# Patient Record
Sex: MALE | Race: WHITE | NOT HISPANIC OR LATINO | Employment: OTHER | URBAN - METROPOLITAN AREA
[De-identification: names, ages, dates, MRNs, and addresses within clinical notes are randomized per-mention and may not be internally consistent; named-entity substitution may affect disease eponyms.]

---

## 2017-02-23 ENCOUNTER — OFFICE VISIT (OUTPATIENT)
Dept: FAMILY MEDICINE | Facility: CLINIC | Age: 32
End: 2017-02-23
Payer: MEDICARE

## 2017-02-23 VITALS
HEART RATE: 80 BPM | SYSTOLIC BLOOD PRESSURE: 130 MMHG | TEMPERATURE: 99.1 F | BODY MASS INDEX: 25.66 KG/M2 | WEIGHT: 159 LBS | OXYGEN SATURATION: 95 % | DIASTOLIC BLOOD PRESSURE: 82 MMHG

## 2017-02-23 DIAGNOSIS — F90.0 ATTENTION DEFICIT HYPERACTIVITY DISORDER (ADHD), PREDOMINANTLY INATTENTIVE TYPE: Primary | ICD-10-CM

## 2017-02-23 DIAGNOSIS — F71 MR (MENTAL RETARDATION), MODERATE: ICD-10-CM

## 2017-02-23 LAB
ANION GAP SERPL CALCULATED.3IONS-SCNC: 6 MMOL/L (ref 3–14)
BUN SERPL-MCNC: 13 MG/DL (ref 7–30)
CALCIUM SERPL-MCNC: 9.7 MG/DL (ref 8.5–10.1)
CHLORIDE SERPL-SCNC: 103 MMOL/L (ref 94–109)
CO2 SERPL-SCNC: 31 MMOL/L (ref 20–32)
CREAT SERPL-MCNC: 0.77 MG/DL (ref 0.66–1.25)
ERYTHROCYTE [DISTWIDTH] IN BLOOD BY AUTOMATED COUNT: 13.4 % (ref 10–15)
GFR SERPL CREATININE-BSD FRML MDRD: NORMAL ML/MIN/1.7M2
GLUCOSE SERPL-MCNC: 94 MG/DL (ref 70–99)
HCT VFR BLD AUTO: 42.4 % (ref 40–53)
HGB BLD-MCNC: 14 G/DL (ref 13.3–17.7)
MCH RBC QN AUTO: 29.5 PG (ref 26.5–33)
MCHC RBC AUTO-ENTMCNC: 33 G/DL (ref 31.5–36.5)
MCV RBC AUTO: 89 FL (ref 78–100)
PLATELET # BLD AUTO: 334 10E9/L (ref 150–450)
POTASSIUM SERPL-SCNC: 4.6 MMOL/L (ref 3.4–5.3)
RBC # BLD AUTO: 4.75 10E12/L (ref 4.4–5.9)
SODIUM SERPL-SCNC: 140 MMOL/L (ref 133–144)
WBC # BLD AUTO: 8 10E9/L (ref 4–11)

## 2017-02-23 PROCEDURE — 80048 BASIC METABOLIC PNL TOTAL CA: CPT | Performed by: FAMILY MEDICINE

## 2017-02-23 PROCEDURE — 36415 COLL VENOUS BLD VENIPUNCTURE: CPT | Performed by: FAMILY MEDICINE

## 2017-02-23 PROCEDURE — 99214 OFFICE O/P EST MOD 30 MIN: CPT | Performed by: FAMILY MEDICINE

## 2017-02-23 PROCEDURE — 85027 COMPLETE CBC AUTOMATED: CPT | Performed by: FAMILY MEDICINE

## 2017-02-23 NOTE — PROGRESS NOTES
SUBJECTIVE:     CC: Isaiah Dominguez is an 31 year old male who presents for preventative health visit.   SUBJECTIVE:   Chief Complaint   Patient presents with     Physical-Other     group home - renew orders     Isaiah Dominguez, a 31 year old male scheduled an appointment to discuss the following issues:     Attention deficit hyperactivity disorder (ADHD), predominantly inattentive type  MR (mental retardation), moderate      Medical, social, surgical, and family histories reviewed. February 23, 2017      OBJECTIVE:  ROS:  CONSTITUTIONAL: NEGATIVE for fever, chills, change in weight  INTEGUMENTARY: NEGATIVE for worrisome rashes, moles or lesions  ENT/MOUTH: NEGATIVE for ear, mouth and throat problems  RESPIRATIONS: NEGATIVE for significant cough or SOB  CV: NEGATIVE for chest pain, palpitations or peripheral edema  GI: NEGATIVE for nausea, abdominal pain, heartburn, or change in bowel habits  : NEGATIVE for frequency, dysuria, or hematuria    EXAM:  /82  Pulse 80  Temp 99.1  F (37.3  C) (Tympanic)  Wt 159 lb (72.1 kg)  SpO2 95%  BMI 25.66 kg/m2  General Appearance: healthy, alert, active and no distress  Nose: Nares normal  Heart: regular rate and rhythm  with normal S1, S2 ; no murmur, rub or gallops  Lungs: clear to auscultation  Abdomen: Soft, nontender.  Normal bowel sounds.  No hepatosplenomegaly or abnormal masses  Extremities: no peripheral edema, peripheral pulses normal  Musculoskeletal: negative  Skin: negative  ASSESSMENT/PLAN:    ICD-10-CM    1. Attention deficit hyperactivity disorder (ADHD), predominantly inattentive type F90.0 CBC with platelets     Basic metabolic panel   2. MR (mental retardation), moderate F71 CBC with platelets     Basic metabolic panel       Routine Health Forms fill out  Renew Standing orders

## 2017-02-23 NOTE — NURSING NOTE
"Chief Complaint   Patient presents with     Physical-Other     group home - renew orders       Initial /82  Pulse 80  Temp 99.1  F (37.3  C) (Tympanic)  Wt 159 lb (72.1 kg)  SpO2 95%  BMI 25.66 kg/m2 Estimated body mass index is 25.66 kg/(m^2) as calculated from the following:    Height as of 1/13/16: 5' 6\" (1.676 m).    Weight as of this encounter: 159 lb (72.1 kg).  Medication Reconciliation: complete    Health Maintenance that is potentially due pending provider review:  NONE    n/a      "

## 2017-02-23 NOTE — MR AVS SNAPSHOT
After Visit Summary   2/23/2017    Isaiah Dominguez    MRN: [id number]           Patient Information     Date Of Birth          1985        Visit Information        Provider Department      2/23/2017 3:40 PM Isaiah Veras MD [facility]        Today's Diagnoses     Attention deficit hyperactivity disorder (ADHD), predominantly inattentive type    -  1    MR (mental retardation), moderate          Care Instructions      Preventive Health Recommendations  Male Ages 26 - 39    Yearly exam:             See your health care provider every year in order to  o   Review health changes.   o   Discuss preventive care.    o   Review your medicines if your doctor has prescribed any.    You should be tested each year for STDs (sexually transmitted diseases), if you re at risk.     After age 35, talk to your provider about cholesterol testing. If you are at risk for heart disease, have your cholesterol tested at least every 5 years.     If you are at risk for diabetes, you should have a diabetes test (fasting glucose).  Shots: Get a flu shot each year. Get a tetanus shot every 10 years.     Nutrition:    Eat at least 5 servings of fruits and vegetables daily.     Eat whole-grain bread, whole-wheat pasta and brown rice instead of white grains and rice.     Talk to your provider about Calcium and Vitamin D.     Lifestyle    Exercise for at least 150 minutes a week (30 minutes a day, 5 days a week). This will help you control your weight and prevent disease.     Limit alcohol to one drink per day.     No smoking.     Wear sunscreen to prevent skin cancer.     See your dentist every six months for an exam and cleaning.           Follow-ups after your visit        Who to contact     If you have questions or need follow up information about today's clinic visit or your schedule please contact [facility] directly at [phone].  Normal or non-critical lab and  "imaging results will be communicated to you by MyChart, letter or phone within 4 business days after the clinic has received the results. If you do not hear from us within 7 days, please contact the clinic through [employer] or phone. If you have a critical or abnormal lab result, we will notify you by phone as soon as possible.  Submit refill requests through [employer] or call your pharmacy and they will forward the refill request to us. Please allow 3 business days for your refill to be completed.          Additional Information About Your Visit        [employer] Information     [employer] lets you send messages to your doctor, view your test results, renew your prescriptions, schedule appointments and more. To sign up, go to [email or web address] . Click on \"Log in\" on the left side of the screen, which will take you to the Welcome page. Then click on \"Sign up Now\" on the right side of the page.     You will be asked to enter the access code listed below, as well as some personal information. Please follow the directions to create your username and password.     Your access code is: [email or web address]  Expires: 2017  4:00 PM     Your access code will  in 90 days. If you need help or a new code, please call your [place] clinic or [phone].        Care EveryWhere ID     This is your Care EveryWhere ID. This could be used by other organizations to access your [place] medical records  [id number]        Your Vitals Were     Pulse Temperature Pulse Oximetry BMI (Body Mass Index)          80 99.1  F (37.3  C) (Tympanic) 95% 25.66 kg/m2         Blood Pressure from Last 3 Encounters:   17 130/82   10/17/16 132/88   16 128/80    Weight from Last 3 Encounters:   17 159 lb (72.1 kg)   10/17/16 160 lb (72.6 kg)   16 157 lb 6.4 oz (71.4 kg)              We Performed the Following     Basic metabolic panel     CBC with platelets        Primary Care Provider    Provider Unknown       No address on " file        Thank you!     Thank you for choosing [facility]  for your care. Our goal is always to provide you with excellent care. Hearing back from our patients is one way we can continue to improve our services. Please take a few minutes to complete the written survey that you may receive in the mail after your visit with us. Thank you!             Your Updated Medication List - Protect others around you: Learn how to safely use, store and throw away your medicines at www.disposemymeds.org.          This list is accurate as of: 2/23/17  4:00 PM.  Always use your most recent med list.                   Brand Name Dispense Instructions for use    ACT ANTICAVITY FLUORIDE RINSE 0.05 % Soln   Generic drug:  Sodium Fluoride      bid       BENADRYL 25 MG tablet   Generic drug:  diphenhydrAMINE      Take 25 mg by mouth as needed for itching or allergies       benzoyl peroxide 5 % topical gel      APPLY TOPICALLY AT BEDTIME DAILY       Efinaconazole 10 % Soln     8 mL    Externally apply topically daily Apply to toenail once daily for 48 weeks       LAMICTAL 100 MG tablet   Generic drug:  lamoTRIgine     60    1 TABLET TWICE DAILY       omega 3 1000 MG Caps      2 CAPSULES ORALLY TWICE DAILY       SEROQUEL 200 MG tablet   Generic drug:  QUEtiapine      Take one tablet by mouth every evening.       STRATTERA 25 MG capsule   Generic drug:  atomoxetine     30    1 tab 8 am

## 2017-04-04 ENCOUNTER — OFFICE VISIT (OUTPATIENT)
Dept: FAMILY MEDICINE | Facility: CLINIC | Age: 32
End: 2017-04-04
Payer: MEDICARE

## 2017-04-04 VITALS
OXYGEN SATURATION: 100 % | HEART RATE: 72 BPM | BODY MASS INDEX: 25.66 KG/M2 | SYSTOLIC BLOOD PRESSURE: 128 MMHG | DIASTOLIC BLOOD PRESSURE: 86 MMHG | RESPIRATION RATE: 22 BRPM | WEIGHT: 159 LBS | TEMPERATURE: 98.3 F

## 2017-04-04 DIAGNOSIS — L01.00 IMPETIGO: Primary | ICD-10-CM

## 2017-04-04 PROCEDURE — 99213 OFFICE O/P EST LOW 20 MIN: CPT | Performed by: FAMILY MEDICINE

## 2017-04-04 RX ORDER — MUPIROCIN CALCIUM 20 MG/G
CREAM TOPICAL 3 TIMES DAILY
Qty: 15 G | Refills: 1 | Status: SHIPPED | OUTPATIENT
Start: 2017-04-04 | End: 2023-07-13

## 2017-04-04 NOTE — PROGRESS NOTES
SUBJECTIVE:                                                    sIaiah Dominguez is a 31 year old male who presents to clinic today for the following health issues: SUDDEN ONSET OF LIP LESION.  NO PAST HISTORY OF HERPES.       Mouth Problem      Duration: 1 day    Description (location/character/radiation): sore, yellow crust on lips    Intensity:  moderate    Accompanying signs and symptoms: Patient had some URI symptoms, was using benadryl but uses the PRN    History (similar episodes/previous evaluation): None    Precipitating or alleviating factors: None    Therapies tried and outcome: Nothing           Problem list and histories reviewed & adjusted, as indicated.  Additional history: as documented    Patient Active Problem List   Diagnosis     Attention deficit hyperactivity disorder (ADHD)     Pituitary dwarfism (H)     CARDIOVASCULAR SCREENING; LDL GOAL LESS THAN 160     Auditory hallucinations     MR (mental retardation), moderate     Past Surgical History:   Procedure Laterality Date     C NONSPECIFIC PROCEDURE      S/P bilateral PE tubes       Social History   Substance Use Topics     Smoking status: Passive Smoke Exposure - Never Smoker     Smokeless tobacco: Never Used      Comment: stepfather in the house     Alcohol use No     Family History   Problem Relation Age of Onset     Unknown/Adopted No family hx of      Asthma No family hx of      C.A.D. No family hx of      DIABETES No family hx of      Hypertension No family hx of          Current Outpatient Prescriptions   Medication Sig Dispense Refill     mupirocin (BACTROBAN) 2 % cream Apply topically 3 times daily 15 g 1     Efinaconazole 10 % SOLN Externally apply topically daily Apply to toenail once daily for 48 weeks 8 mL 3     diphenhydrAMINE (BENADRYL) 25 MG tablet Take 25 mg by mouth as needed for itching or allergies       SEROQUEL 200 MG OR TABS Take one tablet by mouth every evening.       STRATTERA 25 MG OR CAPS 1 tab 8 am 30 0     LAMICTAL  100 MG OR TABS 1 TABLET TWICE DAILY 60 0     OMEGA 3 1000 MG OR CAPS 2 CAPSULES ORALLY TWICE DAILY       BENZOYL PEROXIDE 5 % EX GEL APPLY TOPICALLY AT BEDTIME DAILY       ACT ANTICAVITY FLUORIDE RINSE 0.05 % MT SOLN bid         Reviewed and updated as needed this visit by clinical staff  Tobacco  Allergies  Med Hx  Surg Hx  Fam Hx  Soc Hx      Reviewed and updated as needed this visit by Provider         ROS:  C: NEGATIVE for fever, chills, change in weight  E/M: NEGATIVE for ear, mouth and throat problems  R: NEGATIVE for significant cough or SOB  CV: NEGATIVE for chest pain, palpitations or peripheral edema    OBJECTIVE:                                                    /86 (BP Location: Right arm, Patient Position: Chair, Cuff Size: Adult Regular)  Pulse 72  Temp 98.3  F (36.8  C) (Tympanic)  Resp 22  Wt 159 lb (72.1 kg)  SpO2 100%  BMI 25.66 kg/m2  Body mass index is 25.66 kg/(m^2).  GENERAL: healthy, alert and no distress  NECK: no adenopathy, no asymmetry, masses, or scars and thyroid normal to palpation  RESP: lungs clear to auscultation - no rales, rhonchi or wheezes  CV: regular rate and rhythm, normal S1 S2, no S3 or S4, no murmur, click or rub, no peripheral edema and peripheral pulses strong  ABDOMEN: soft, nontender, no hepatosplenomegaly, no masses and bowel sounds normal  LIP  HE HAS CRUSTY PURULENT LESION ON BOTH THE UPPER AND LOWER LIP ON THE RIGHT.          ASSESSMENT/PLAN:                                                            1. Impetigo    - mupirocin (BACTROBAN) 2 % cream; Apply topically 3 times daily  Dispense: 15 g; Refill: 1    ASSESSMENT/PLAN:      ICD-10-CM    1. Impetigo L01.00 mupirocin (BACTROBAN) 2 % cream       Patient Instructions   1. Wash gently with washcoth three times a day    2. Rub cream into lesions three times daily after wash.    3. Call if not improving one week.          Isaiah Veras MD  [facility]

## 2017-04-04 NOTE — LETTER
[facility]  [address] [place] [id number]  [phone]      April 4, 2017      Isaiah MCKEON Alberto  [address] [address] [facility] [address]         Isaiah Ferguson, He was seen today and may go back to work on 4/6/17.          Sincerely,  Isaiah Veras MD

## 2017-04-04 NOTE — MR AVS SNAPSHOT
"              After Visit Summary   2017    Isaiah Dominguez    MRN: [id number]           Patient Information     Date Of Birth          1985        Visit Information        Provider Department      2017 3:00 PM Isaiah Veras MD [facility]        Today's Diagnoses     Impetigo    -  1      Care Instructions    1. Wash gently with washcoth three times a day    2. Rub cream into lesions three times daily after wash.    3. Call if not improving one week.        Follow-ups after your visit        Who to contact     If you have questions or need follow up information about today's clinic visit or your schedule please contact [facility] directly at [phone].  Normal or non-critical lab and imaging results will be communicated to you by MyChart, letter or phone within 4 business days after the clinic has received the results. If you do not hear from us within 7 days, please contact the clinic through [employer] or phone. If you have a critical or abnormal lab result, we will notify you by phone as soon as possible.  Submit refill requests through [email or web address] or call your pharmacy and they will forward the refill request to us. Please allow 3 business days for your refill to be completed.          Additional Information About Your Visit        MyChart Information     [email or web address] lets you send messages to your doctor, view your test results, renew your prescriptions, schedule appointments and more. To sign up, go to [email or web address] . Click on \"Log in\" on the left side of the screen, which will take you to the Welcome page. Then click on \"Sign up Now\" on the right side of the page.     You will be asked to enter the access code listed below, as well as some personal information. Please follow the directions to create your username and password.     Your access code is: [email or web address]  Expires: 2017  5:00 PM     Your access code will  in 90 days. If you " need help or a new code, please call your [facility] or [phone].        Care EveryWhere ID     This is your Care EveryWhere ID. This could be used by other organizations to access your [place] medical records  [id number]        Your Vitals Were     Pulse Temperature Respirations Pulse Oximetry BMI (Body Mass Index)       72 98.3  F (36.8  C) (Tympanic) 22 100% 25.66 kg/m2        Blood Pressure from Last 3 Encounters:   04/04/17 128/86   02/23/17 130/82   10/17/16 132/88    Weight from Last 3 Encounters:   04/04/17 159 lb (72.1 kg)   02/23/17 159 lb (72.1 kg)   10/17/16 160 lb (72.6 kg)              Today, you had the following     No orders found for display         Today's Medication Changes          These changes are accurate as of: 4/4/17  3:02 PM.  If you have any questions, ask your nurse or doctor.               Start taking these medicines.        Dose/Directions    mupirocin 2 % cream   Commonly known as:  BACTROBAN   Used for:  Impetigo   Started by:  Isaiah Veras MD        Apply topically 3 times daily   Quantity:  15 g   Refills:  1            Where to get your medicines      These medications were sent to [facility] - [address] [facility] [address]     Phone:  [phone]     mupirocin 2 % cream                Primary Care Provider    Provider Unknown       No address on file        Thank you!     Thank you for choosing [facility]  for your care. Our goal is always to provide you with excellent care. Hearing back from our patients is one way we can continue to improve our services. Please take a few minutes to complete the written survey that you may receive in the mail after your visit with us. Thank you!             Your Updated Medication List - Protect others around you: Learn how to safely use, store and throw away your medicines at www.disposemymeds.org.          This list is accurate  as of: 4/4/17  3:02 PM.  Always use your most recent med list.                   Brand Name Dispense Instructions for use    ACT ANTICAVITY FLUORIDE RINSE 0.05 % Soln   Generic drug:  Sodium Fluoride      bid       BENADRYL 25 MG tablet   Generic drug:  diphenhydrAMINE      Take 25 mg by mouth as needed for itching or allergies       benzoyl peroxide 5 % topical gel      APPLY TOPICALLY AT BEDTIME DAILY       Efinaconazole 10 % Soln     8 mL    Externally apply topically daily Apply to toenail once daily for 48 weeks       LAMICTAL 100 MG tablet   Generic drug:  lamoTRIgine     60    1 TABLET TWICE DAILY       mupirocin 2 % cream    BACTROBAN    15 g    Apply topically 3 times daily       omega 3 1000 MG Caps      2 CAPSULES ORALLY TWICE DAILY       SEROQUEL 200 MG tablet   Generic drug:  QUEtiapine      Take one tablet by mouth every evening.       STRATTERA 25 MG capsule   Generic drug:  atomoxetine     30    1 tab 8 am

## 2017-04-04 NOTE — NURSING NOTE
"Chief Complaint   Patient presents with     Mouth Problem       Initial /86 (BP Location: Right arm, Patient Position: Chair, Cuff Size: Adult Regular)  Pulse 72  Temp 98.3  F (36.8  C) (Tympanic)  Resp 22  Wt 159 lb (72.1 kg)  SpO2 100%  BMI 25.66 kg/m2 Estimated body mass index is 25.66 kg/(m^2) as calculated from the following:    Height as of 1/13/16: 5' 6\" (1.676 m).    Weight as of this encounter: 159 lb (72.1 kg).  Medication Reconciliation: complete    Health Maintenance that is potentially due pending provider review:  NONE    n/a      "

## 2017-06-29 ENCOUNTER — APPOINTMENT (OUTPATIENT)
Dept: GENERAL RADIOLOGY | Facility: CLINIC | Age: 32
End: 2017-06-29
Attending: NURSE PRACTITIONER
Payer: MEDICARE

## 2017-06-29 ENCOUNTER — HOSPITAL ENCOUNTER (EMERGENCY)
Facility: CLINIC | Age: 32
Discharge: HOME | End: 2017-06-29
Attending: NURSE PRACTITIONER | Admitting: NURSE PRACTITIONER
Payer: MEDICARE

## 2017-06-29 VITALS
SYSTOLIC BLOOD PRESSURE: 157 MMHG | TEMPERATURE: 98 F | DIASTOLIC BLOOD PRESSURE: 101 MMHG | OXYGEN SATURATION: 97 % | RESPIRATION RATE: 16 BRPM

## 2017-06-29 DIAGNOSIS — S63.292A: Primary | ICD-10-CM

## 2017-06-29 PROCEDURE — 40000986 XR FINGER RT G/E 2 VW: Mod: RT

## 2017-06-29 PROCEDURE — 99213 OFFICE O/P EST LOW 20 MIN: CPT | Mod: 25 | Performed by: NURSE PRACTITIONER

## 2017-06-29 PROCEDURE — 99213 OFFICE O/P EST LOW 20 MIN: CPT | Mod: 25

## 2017-06-29 PROCEDURE — 26770 TREAT FINGER DISLOCATION: CPT | Performed by: NURSE PRACTITIONER

## 2017-06-29 PROCEDURE — 26770 TREAT FINGER DISLOCATION: CPT

## 2017-06-29 PROCEDURE — 73140 X-RAY EXAM OF FINGER(S): CPT | Mod: RT

## 2017-06-29 NOTE — ED AVS SNAPSHOT
[facility]    [address] [facility] [address]    Phone:  [phone]    Fax:  [phone]                                       Isaiah Dominguez   MRN: [id number]    Department:  [facility] Department   Date of Visit:  6/29/2017           After Visit Summary Signature Page     I have received my discharge instructions, and my questions have been answered. I have discussed any challenges I see with this plan with the nurse or doctor.    ..........................................................................................................................................  Patient/Patient Representative Signature      ..........................................................................................................................................  Patient Representative Print Name and Relationship to Patient    ..................................................               ................................................  Date                                            Time    ..........................................................................................................................................  Reviewed by Signature/Title    ...................................................              ..............................................  Date                                                            Time

## 2017-06-29 NOTE — ED AVS SNAPSHOT
[facility]    [address] [facility] [address]    Phone:  [phone]    Fax:  [phone]                                       Isaiah Dominguez   MRN: [id number]    Department:  [facility]   Date of Visit:  6/29/2017           Patient Information     Date Of Birth          1985        Your diagnoses for this visit were:     Dislocation of distal interphalangeal (DIP) joint of right middle finger, initial encounter        You were seen by Janey Vora APRN CNP.      Follow-up Information     Please follow up.    Why:  As needed, If symptoms worsen        Follow up with Unknown, Provider.        Discharge Instructions         Wear the splint for 5 days with additional buddy tape.  Following the 5 days you may wish to use buddy tape when playing softball for a few additional weeks as needed for comfort.  It is important to complete finger exercises.  Follow up as needed or if there is lack of ability to move the finger.    Finger Dislocation  A finger dislocation occurs when the tissues, or ligaments, that hold the joint together are torn. The bones then move apart, or are dislocated, out of their normal position. This causes pain, swelling, and bruising. Sometimes there is also a small chip fracture. Once the joint is put back into place again, it will take about 6 weeks for the ligaments to heal. During this time, you should protect your finger from re-injury.    Buddy taping is a way to secure your injured finger to the one next to it. Buddy taping allows the joint to move. But it also protects it from dislocating again. Buddy tape can be left in place for up to 6 weeks.  Hand exercises may be prescribed at your follow-up visit. These can help speed healing and maintain function. In most cases you will regain full function of your finger. But it may take 12 to 18 months before all mild pain and swelling goes away and full function  returns.  Home care    Keep your hand raised, or elevated, to reduce pain and swelling. When sitting or lying down, raise your arm above the level of your heart. You can do this by placing your arm on a pillow that rests on your chest. Or your arm can be on a pillow at your side. This is most important during the first 48 hours after injury.    Put an ice pack on the injured area for no more than 15 to 20 minutes every 3 to 6 hours. Do this for the first 24 to 48 hours. You can make an ice pack by wrapping a plastic Ziploc bag of ice cubes in a thin towel. As the ice melts, be careful that the tape, gauze, or splint doesn t get wet. After that, keep using ice as needed to ease pain and swelling.    If you have a removable splint, you may take it off to bathe and then put it back on. If you have a permanent splint, cover your entire hand with 2 plastic bags. Place 1 bag around the other. Tape each bag with duct tape at the top end. Water can still leak in even when your hand is covered. So it's best to keep the splint away from water. If a splint gets wet, you can dry it with a hair-dryer on a cool setting.     If you use buddy tape and it becomes wet or dirty, change it. You may replace it with paper, plastic, or cloth tape. Cloth tape and paper tapes must be kept dry. When re-applying buddy tape, use gauze or cotton padding between your fingers. This will prevent the skin from getting moist and breaking down, or macerating. It is very important to put padding at the web space. This is the small piece of skin that joins the bases of your fingers. Keep the buddy tape in place as instructed by your healthcare provider.    You may use over-the-counter pain medicine to control pain, unless another pain medicine was prescribed. Talk with your provider before taking these medicines if you have chronic liver or kidney disease. Also talk with your provider if you have ever had a stomach ulcer or GI (gastrointestinal)  bleeding.    Do not play sports or do any physical exercise until your healthcare provider says that you can.  Follow-up care  Follow up with your healthcare provider in 1 week, or as advised. Splints should generally not be left in place longer than 3 weeks to avoid stiffness and loss of joint function. It is important that you see the referral doctor. This provider can determine how long to keep your splint in place and when to begin hand exercises.  If X-rays were taken, you will be told of any new findings that may affect your care.  When to seek medical advice  Call your healthcare provider right away if any of the following occur:    The injured finger has more pain or swelling    The injured finger becomes red or warm    The injured finger becomes cold, blue, numb, or tingly  Date Last Reviewed: 11/23/2015 2000-2017 [employer] [address] [place] [address]. All rights reserved. This information is not intended as a substitute for professional medical care. Always follow your healthcare professional's instructions.          24 Hour Appointment Hotline       To make an appointment at any [facility], call [phone] ([phone]). If you don't have a family doctor or clinic, we will help you find one. [facility] are conveniently located to serve the needs of you and your family.             Review of your medicines      Our records show that you are taking the medicines listed below. If these are incorrect, please call your family doctor or clinic.        Dose / Directions Last dose taken    ACT ANTICAVITY FLUORIDE RINSE 0.05 % Soln   Generic drug:  Sodium Fluoride        bid   Refills:  0        BENADRYL 25 MG tablet   Dose:  25 mg   Generic drug:  diphenhydrAMINE        Take 25 mg by mouth as needed for itching or allergies   Refills:  0        benzoyl peroxide 5 % topical gel        APPLY TOPICALLY AT BEDTIME DAILY   Refills:  0        Efinaconazole 10 % Soln    Quantity:  8 mL        Externally apply topically daily Apply to toenail once daily for 48 weeks   Refills:  3        LAMICTAL 100 MG tablet   Quantity:  60   Generic drug:  lamoTRIgine        1 TABLET TWICE DAILY   Refills:  0        mupirocin 2 % cream   Commonly known as:  BACTROBAN   Quantity:  15 g        Apply topically 3 times daily   Refills:  1        omega 3 1000 MG Caps        2 CAPSULES ORALLY TWICE DAILY   Refills:  0        SEROQUEL 200 MG tablet   Generic drug:  QUEtiapine        Take one tablet by mouth every evening.   Refills:  0        STRATTERA 25 MG capsule   Quantity:  30   Generic drug:  atomoxetine        1 tab 8 am   Refills:  0                Procedures and tests performed during your visit     Procedure/Test Number of Times Performed    Fingers XR, 2-3 views, right 2      Orders Needing Specimen Collection     None      Pending Results     Date and Time Order Name Status Description    6/29/2017 1946 Fingers XR, 2-3 views, right Preliminary             Pending Culture Results     No orders found from 6/27/2017 to 6/30/2017.            Pending Results Instructions     If you had any lab results that were not finalized at the time of your Discharge, you can call the ED Lab Result RN at [phone]. You will be contacted by this team for any positive Lab results or changes in treatment. The nurses are available 7 days a week from 10A to 6:30P.  You can leave a message 24 hours per day and they will return your call.        Test Results From Your Hospital Stay        6/29/2017  8:03 PM      Narrative     FINGER RIGHT TWO OR MORE VIEWS    6/29/2017 7:23 PM     INDICATION: Hit with softball.    COMPARISON: None.        Impression     IMPRESSION: Dislocation of the third finger at the DIP joint with  dorsal displacement of the distal phalanx. No fracture demonstrated.    NAE RODRIGUEZ MD         6/29/2017  8:30 PM      Narrative     FINGER RIGHT TWO OR MORE VIEWS   6/29/2017 8:05 PM  "    HISTORY: Post reduction    COMPARISON: 2017        Impression     IMPRESSION: The dislocation of the distal interphalangeal joint of the  middle finger has been reduced. No fracture is evident.                Thank you for choosing [facility]       Thank you for choosing [facility] for your care. Our goal is always to provide you with excellent care. Hearing back from our patients is one way we can continue to improve our services. Please take a few minutes to complete the written survey that you may receive in the mail after you visit with us. Thank you!        [employer] Information     [employer] lets you send messages to your doctor, view your test results, renew your prescriptions, schedule appointments and more. To sign up, go to [email or web address] . Click on \"Log in\" on the left side of the screen, which will take you to the Welcome page. Then click on \"Sign up Now\" on the right side of the page.     You will be asked to enter the access code listed below, as well as some personal information. Please follow the directions to create your username and password.     Your access code is: [email or web address]  Expires: 2017  8:35 PM     Your access code will  in 90 days. If you need help or a new code, please call your [facility] clinic or [phone].        Care EveryWhere ID     This is your Care EveryWhere ID. This could be used by other organizations to access your [facility] medical records  [id number]        Equal Access to Services     GABRIELLA ALCALA : Hadbeverly Asher, waaxda nayeli, qaybta kaalmabernadette summers . So [facility] [phone].    ATENCIÓN: Si habla español, tiene a boo disposición servicios gratuitos de asistencia lingüística. Llame al [phone].    We comply with applicable federal civil rights laws and [place] laws. We do not discriminate on the basis of race, color, national origin, age, disability sex, sexual orientation or gender " identity.            After Visit Summary       This is your record. Keep this with you and show to your community pharmacist(s) and doctor(s) at your next visit.

## 2017-06-30 NOTE — DISCHARGE INSTRUCTIONS
Wear the splint for 5 days with additional buddy tape.  Following the 5 days you may wish to use buddy tape when playing softball for a few additional weeks as needed for comfort.  It is important to complete finger exercises.  Follow up as needed or if there is lack of ability to move the finger.    Finger Dislocation  A finger dislocation occurs when the tissues, or ligaments, that hold the joint together are torn. The bones then move apart, or are dislocated, out of their normal position. This causes pain, swelling, and bruising. Sometimes there is also a small chip fracture. Once the joint is put back into place again, it will take about 6 weeks for the ligaments to heal. During this time, you should protect your finger from re-injury.    Buddy taping is a way to secure your injured finger to the one next to it. Buddy taping allows the joint to move. But it also protects it from dislocating again. Buddy tape can be left in place for up to 6 weeks.  Hand exercises may be prescribed at your follow-up visit. These can help speed healing and maintain function. In most cases you will regain full function of your finger. But it may take 12 to 18 months before all mild pain and swelling goes away and full function returns.  Home care    Keep your hand raised, or elevated, to reduce pain and swelling. When sitting or lying down, raise your arm above the level of your heart. You can do this by placing your arm on a pillow that rests on your chest. Or your arm can be on a pillow at your side. This is most important during the first 48 hours after injury.    Put an ice pack on the injured area for no more than 15 to 20 minutes every 3 to 6 hours. Do this for the first 24 to 48 hours. You can make an ice pack by wrapping a plastic Ziploc bag of ice cubes in a thin towel. As the ice melts, be careful that the tape, gauze, or splint doesn t get wet. After that, keep using ice as needed to ease pain and swelling.    If you  have a removable splint, you may take it off to bathe and then put it back on. If you have a permanent splint, cover your entire hand with 2 plastic bags. Place 1 bag around the other. Tape each bag with duct tape at the top end. Water can still leak in even when your hand is covered. So it's best to keep the splint away from water. If a splint gets wet, you can dry it with a hair-dryer on a cool setting.     If you use nishi tape and it becomes wet or dirty, change it. You may replace it with paper, plastic, or cloth tape. Cloth tape and paper tapes must be kept dry. When re-applying nishi tape, use gauze or cotton padding between your fingers. This will prevent the skin from getting moist and breaking down, or macerating. It is very important to put padding at the web space. This is the small piece of skin that joins the bases of your fingers. Keep the nishi tape in place as instructed by your healthcare provider.    You may use over-the-counter pain medicine to control pain, unless another pain medicine was prescribed. Talk with your provider before taking these medicines if you have chronic liver or kidney disease. Also talk with your provider if you have ever had a stomach ulcer or GI (gastrointestinal) bleeding.    Do not play sports or do any physical exercise until your healthcare provider says that you can.  Follow-up care  Follow up with your healthcare provider in 1 week, or as advised. Splints should generally not be left in place longer than 3 weeks to avoid stiffness and loss of joint function. It is important that you see the referral doctor. This provider can determine how long to keep your splint in place and when to begin hand exercises.  If X-rays were taken, you will be told of any new findings that may affect your care.  When to seek medical advice  Call your healthcare provider right away if any of the following occur:    The injured finger has more pain or swelling    The injured finger  becomes red or warm    The injured finger becomes cold, blue, numb, or tingly  Date Last Reviewed: 11/23/2015 2000-2017 [employer]. [address] [place] [address], [place] [address]. All rights reserved. This information is not intended as a substitute for professional medical care. Always follow your healthcare professional's instructions.

## 2017-06-30 NOTE — ED PROVIDER NOTES
History     Chief Complaint   Patient presents with     Hand Injury     jammed finger while playing basketball     HPI  Isaiah Dominguez is a 31 year old male who presents with right middle finger injury.  Pt reports he was playing softball and playing catch and the ball hit his middle finger and jammed it.  Pt reports pain from MIP joint to DIP joint of palmar side.  Pt reports mild to moderate pain.  Pt reports normal sensation.  Pt lives in group home for special needs.    I have reviewed the Medications, Allergies, Past Medical and Surgical History, and Social History in the Epic system.    Allergies:   Allergies   Allergen Reactions     No Known Allergies        No current facility-administered medications on file prior to encounter.   Current Outpatient Prescriptions on File Prior to Encounter:  mupirocin (BACTROBAN) 2 % cream Apply topically 3 times daily   Efinaconazole 10 % SOLN Externally apply topically daily Apply to toenail once daily for 48 weeks   diphenhydrAMINE (BENADRYL) 25 MG tablet Take 25 mg by mouth as needed for itching or allergies   SEROQUEL 200 MG OR TABS Take one tablet by mouth every evening.   STRATTERA 25 MG OR CAPS 1 tab 8 am   LAMICTAL 100 MG OR TABS 1 TABLET TWICE DAILY   OMEGA 3 1000 MG OR CAPS 2 CAPSULES ORALLY TWICE DAILY   BENZOYL PEROXIDE 5 % EX GEL APPLY TOPICALLY AT BEDTIME DAILY   ACT ANTICAVITY FLUORIDE RINSE 0.05 % MT SOLN bid     Patient Active Problem List   Diagnosis     Attention deficit hyperactivity disorder (ADHD)     Pituitary dwarfism (H)     CARDIOVASCULAR SCREENING; LDL GOAL LESS THAN 160     Auditory hallucinations     MR (mental retardation), moderate     Past Surgical History:   Procedure Laterality Date     C NONSPECIFIC PROCEDURE      S/P bilateral PE tubes     Most Recent Immunizations   Administered Date(s) Administered     DPT 08/08/1991     DTAP (<7y) 05/13/1999     Hepatitis A Vac Ped/Adol-2 Dose 01/25/2008     Hepatitis B 10/21/1998     Influenza  (H1N1) 02/15/2010     Influenza (IIV3) 10/02/2012     Influenza Vaccine IM 3yrs+ 4 Valent IIV4 10/21/2016     Influenza Vaccine IM Ages 6-35 Months 4 Valent (PF) 09/23/2013     MMR 05/13/1999     Mantoux 04/18/2008     OPV 08/08/1991     TDAP Vaccine (Adacel) 03/22/2010       Review of Systems  10 point ROS of systems including Constitutional, Eyes, Respiratory, Cardiovascular, Gastroenterology, Genitourinary, Integumentary, Muscularskeletal, Psychiatric were all negative except for pertinent positives noted in my HPI.    Physical Exam   BP: (!) 159/103  Heart Rate: 93  Temp: 98  F (36.7  C)  Resp: 16  SpO2: 97 %  Physical Exam   Constitutional: He is oriented to person, place, and time. He appears well-developed and well-nourished. No distress.   HENT:   Head: Normocephalic and atraumatic.   Right Ear: External ear normal.   Left Ear: External ear normal.   Nose: Nose normal.   Mouth/Throat: Oropharynx is clear and moist.   Eyes: Conjunctivae and EOM are normal. Pupils are equal, round, and reactive to light. Right eye exhibits no discharge. Left eye exhibits no discharge.   Neck: Normal range of motion. Neck supple.   Cardiovascular: Normal rate, regular rhythm and normal heart sounds.  Exam reveals no gallop and no friction rub.    No murmur heard.  Pulmonary/Chest: Effort normal and breath sounds normal. No respiratory distress. He has no wheezes. He has no rales.   Musculoskeletal:        Right hand: He exhibits tenderness (at DIP joint palmar side of middle finger of right hand). He exhibits normal two-point discrimination. Normal sensation noted. Normal strength noted.        Hands:  Lymphadenopathy:     He has no cervical adenopathy.   Neurological: He is alert and oriented to person, place, and time.   Skin: Skin is warm and dry. No rash noted. He is not diaphoretic. No erythema. No pallor.   Nursing note and vitals reviewed.      ED Course     ED Course     Procedures   Reduction of dislocation of right  middle finger DIP joint with dorsal displacement of the distal phalanx.  Full informed consent obtained.  The patient's right middle finger was grasped above the MIP joint with the left hand of the provider and the right tip of the finger at the DIP joint was grasped with the right hand of the provider.  Longitudinal traction was applied and the DIP joint was gently hyperextended and it slid back into place easily and without discomfort.  Follow up xray confirmed reduction.  Pt placed in splint and nishi taped.      Labs Ordered and Resulted from Time of ED Arrival Up to the Time of Departure from the ED - No data to display  Results for orders placed or performed during the hospital encounter of 06/29/17   Fingers XR, 2-3 views, right    Narrative    FINGER RIGHT TWO OR MORE VIEWS    6/29/2017 7:23 PM     INDICATION: Hit with softball.    COMPARISON: None.      Impression    IMPRESSION: Dislocation of the third finger at the DIP joint with  dorsal displacement of the distal phalanx. No fracture demonstrated.    NAE RODRIGUEZ MD   Fingers XR, 2-3 views, right    Narrative    FINGER RIGHT TWO OR MORE VIEWS   6/29/2017 8:05 PM     HISTORY: Post reduction    COMPARISON: 6/29/2017      Impression    IMPRESSION: The dislocation of the distal interphalangeal joint of the  middle finger has been reduced. No fracture is evident.       Assessments & Plan (with Medical Decision Making)     I have reviewed the nursing notes.    I have reviewed the findings, diagnosis, plan and need for follow up with the patient.  Isaiah Dominguez is a 31 year old male who presents with right middle finger injury.  Pt reports he was playing softball and playing catch and the ball hit his middle finger and jammed it.  Pt reports pain from MIP joint to DIP joint of palmar side.  Pt reports mild to moderate pain.  Pt reports normal sensation.  Pt lives in group home for special needs. Exam reveals bruising and tenderness at DIP joint and  inability to flex at DIP joint of right middle finger.  Xray reveals dorsal dislocation.  Reduction of dislocation of right middle finger DIP joint with dorsal displacement of the distal phalanx.  Full informed consent obtained. The patient's right middle finger was grasped above the MIP joint with the left hand of the provider and the right tip of the finger at the DIP joint was grasped with the right hand of the provider.  Longitudinal traction was applied and the DIP joint was gently hyperextended and it slid back into place easily and without discomfort.  Follow up xray confirmed reduction.  Pt placed in splint and nishi taped.  Recommended wear splint for 5 days and then nishi tape prn with sports for up to 3 weeks.  DDx: Fracture, dislocation, contusion, sprain/strain  Blood pressure persistently elevated throughout visit, recommended follow up next week with PCP for further evaluation.    New Prescriptions    No medications on file       Final diagnoses:   Dislocation of distal interphalangeal (DIP) joint of right middle finger, initial encounter       6/29/2017   [facility] EMERGENCY DEPARTMENT     Janey Vora APRN CNP  06/29/17 2039       Janey Vora APRN CNP  06/29/17 2041       Janey Vora APRN CNP  06/29/17 2043

## 2017-09-29 ENCOUNTER — TELEPHONE (OUTPATIENT)
Dept: FAMILY MEDICINE | Facility: CLINIC | Age: 32
End: 2017-09-29

## 2017-09-30 NOTE — TELEPHONE ENCOUNTER
Does patient need to see Dr. Veras or podiatrist regarding his medication for his toe fungus? He is coming up on his one year of being on the medication for the fungus. Please call to discuss.    Jazmyn HUTCHINS  Central Scheduler

## 2017-10-03 ENCOUNTER — ALLIED HEALTH/NURSE VISIT (OUTPATIENT)
Dept: FAMILY MEDICINE | Facility: CLINIC | Age: 32
End: 2017-10-03
Payer: MEDICARE

## 2017-10-03 DIAGNOSIS — Z23 NEED FOR PROPHYLACTIC VACCINATION AND INOCULATION AGAINST INFLUENZA: Primary | ICD-10-CM

## 2017-10-03 PROCEDURE — 90686 IIV4 VACC NO PRSV 0.5 ML IM: CPT

## 2017-10-03 PROCEDURE — 99207 ZZC NO CHARGE NURSE ONLY: CPT

## 2017-10-03 PROCEDURE — G0008 ADMIN INFLUENZA VIRUS VAC: HCPCS

## 2017-10-03 NOTE — MR AVS SNAPSHOT
"              After Visit Summary   10/3/2017    Isaiah Dominguez    MRN: [id number]           Patient Information     Date Of Birth          1985        Visit Information        Provider Department      10/3/2017 3:45 PM [place] IESHA WADE/LPN [facility]        Today's Diagnoses     Need for prophylactic vaccination and inoculation against influenza    -  1       Follow-ups after your visit        Your next 10 appointments already scheduled     Oct 03, 2017  3:45 PM CDT   Nurse Only with [place] IESHA WADE/LPN   [facility] ([facility])    [address] [place] [id number] [phone]              Who to contact     If you have questions or need follow up information about today's clinic visit or your schedule please contact [facility] directly at [phone].  Normal or non-critical lab and imaging results will be communicated to you by [facility], letter or phone within 4 business days after the clinic has received the results. If you do not hear from us within 7 days, please contact the clinic through [facility] or phone. If you have a critical or abnormal lab result, we will notify you by phone as soon as possible.  Submit refill requests through [employer] or call your pharmacy and they will forward the refill request to us. Please allow 3 business days for your refill to be completed.          Additional Information About Your Visit        MyChart Information     [employer] lets you send messages to your doctor, view your test results, renew your prescriptions, schedule appointments and more. To sign up, go to [email or web address] . Click on \"Log in\" on the left side of the screen, which will take you to the Welcome page. Then click on \"Sign up Now\" on the right side of the page.     You will be asked to enter the access code listed below, as well as some personal information. Please follow the directions to create your username and " password.     Your access code is: [email or web address]  Expires: 2018  3:18 PM     Your access code will  in 90 days. If you need help or a new code, please call your [place] clinic or [phone].        Care EveryWhere ID     This is your Care EveryWhere ID. This could be used by other organizations to access your [place] medical records  [id number]         Blood Pressure from Last 3 Encounters:   17 (!) 157/101   17 128/86   17 130/82    Weight from Last 3 Encounters:   17 159 lb (72.1 kg)   17 159 lb (72.1 kg)   10/17/16 160 lb (72.6 kg)              We Performed the Following     FLU VAC, SPLIT VIRUS IM > 3 YO (QUADRIVALENT) [78899]     Vaccine Administration, Initial [34185]        Primary Care Provider    None Specified       No primary provider on file.        Equal Access to Services     GABRIELLA ALCALA : Hadii pan morrisono Soventura, waaxda luqadaha, qaybta kaalmada adecharlotte, bernadette singh . So [facility] [phone].    ATENCIÓN: Si habla español, tiene a boo disposición servicios gratuitos de asistencia lingüística. Llame al [phone].    We comply with applicable federal civil rights laws and [place] laws. We do not discriminate on the basis of race, color, national origin, age, disability, sex, sexual orientation, or gender identity.            Thank you!     Thank you for choosing [facility]  for your care. Our goal is always to provide you with excellent care. Hearing back from our patients is one way we can continue to improve our services. Please take a few minutes to complete the written survey that you may receive in the mail after your visit with us. Thank you!             Your Updated Medication List - Protect others around you: Learn how to safely use, store and throw away your medicines at www.disposemymeds.org.          This list is accurate as of: 10/3/17  3:18 PM.  Always use your most recent med list.                    Brand Name Dispense Instructions for use Diagnosis    ACT ANTICAVITY FLUORIDE RINSE 0.05 % Soln   Generic drug:  Sodium Fluoride      bid        BENADRYL 25 MG tablet   Generic drug:  diphenhydrAMINE      Take 25 mg by mouth as needed for itching or allergies        benzoyl peroxide 5 % topical gel      APPLY TOPICALLY AT BEDTIME DAILY        Efinaconazole 10 % Soln     8 mL    Externally apply topically daily Apply to toenail once daily for 48 weeks    Fungal toenail infection       LAMICTAL 100 MG tablet   Generic drug:  lamoTRIgine     60    1 TABLET TWICE DAILY    Attention deficit disorder with hyperactivity(314.01)       mupirocin 2 % cream    BACTROBAN    15 g    Apply topically 3 times daily    Impetigo       omega 3 1000 MG Caps      2 CAPSULES ORALLY TWICE DAILY        SEROQUEL 200 MG tablet   Generic drug:  QUEtiapine      Take one tablet by mouth every evening.        STRATTERA 25 MG capsule   Generic drug:  atomoxetine     30    1 tab 8 am    Attention deficit disorder with hyperactivity(314.01)

## 2017-10-03 NOTE — PROGRESS NOTES
Injectable Influenza Immunization Documentation    1.  Is the person to be vaccinated sick today?   No    2. Does the person to be vaccinated have an allergy to a component   of the vaccine?   No    3. Has the person to be vaccinated ever had a serious reaction   to influenza vaccine in the past?   No    4. Has the person to be vaccinated ever had Guillain-Barré syndrome?   No    Form completed by Erika Iraheta MA

## 2018-04-10 ENCOUNTER — TELEPHONE (OUTPATIENT)
Dept: FAMILY MEDICINE | Facility: CLINIC | Age: 33
End: 2018-04-10

## 2018-06-26 ENCOUNTER — OFFICE VISIT (OUTPATIENT)
Dept: FAMILY MEDICINE | Facility: CLINIC | Age: 33
End: 2018-06-26
Payer: MEDICARE

## 2018-06-26 VITALS
BODY MASS INDEX: 26.2 KG/M2 | HEIGHT: 66 IN | HEART RATE: 77 BPM | DIASTOLIC BLOOD PRESSURE: 84 MMHG | WEIGHT: 163 LBS | RESPIRATION RATE: 16 BRPM | SYSTOLIC BLOOD PRESSURE: 132 MMHG | TEMPERATURE: 97.7 F

## 2018-06-26 DIAGNOSIS — E23.0 PITUITARY DWARFISM (H): ICD-10-CM

## 2018-06-26 DIAGNOSIS — F90.0 ATTENTION DEFICIT HYPERACTIVITY DISORDER (ADHD), PREDOMINANTLY INATTENTIVE TYPE: ICD-10-CM

## 2018-06-26 DIAGNOSIS — F71 MR (MENTAL RETARDATION), MODERATE: ICD-10-CM

## 2018-06-26 DIAGNOSIS — Z00.00 ROUTINE HISTORY AND PHYSICAL EXAMINATION OF ADULT: Primary | ICD-10-CM

## 2018-06-26 PROCEDURE — 99395 PREV VISIT EST AGE 18-39: CPT | Performed by: PHYSICIAN ASSISTANT

## 2018-06-26 NOTE — PROGRESS NOTES
SUBJECTIVE:   CC: Isaiah Ferguson MIKE Dominguez is an 32 year old male who presents for preventative health visit.     Physical   Annual:     Getting at least 3 servings of Calcium per day:  Yes    Bi-annual eye exam:  Yes    Dental care twice a year:  Yes    Sleep apnea or symptoms of sleep apnea:  None    Diet:  Regular (no restrictions)    Taking medications regularly:  Yes    Medication side effects:  None    Additional concerns today:  No            Today's PHQ-2 Score:   PHQ-2 ( 1999 Pfizer) 6/26/2018   Q1: Little interest or pleasure in doing things 0   Q2: Feeling down, depressed or hopeless 0   PHQ-2 Score 0   Q1: Little interest or pleasure in doing things Not at all   Q2: Feeling down, depressed or hopeless Not at all   PHQ-2 Score 0       Abuse: Current or Past(Physical, Sexual or Emotional)- No  Do you feel safe in your environment - Yes    Social History   Substance Use Topics     Smoking status: Passive Smoke Exposure - Never Smoker     Smokeless tobacco: Never Used      Comment: stepfather in the house     Alcohol use No     Alcohol Use 6/26/2018   If you drink alcohol do you typically have greater than 3 drinks per day OR greater than 7 drinks per week? No       Last PSA: No results found for: PSA    Reviewed orders with patient. Reviewed health maintenance and updated orders accordingly - Yes  Labs reviewed in EPIC  BP Readings from Last 3 Encounters:   06/26/18 132/84   06/29/17 (!) 157/101   04/04/17 128/86    Wt Readings from Last 3 Encounters:   06/26/18 163 lb (73.9 kg)   04/04/17 159 lb (72.1 kg)   02/23/17 159 lb (72.1 kg)                  Patient Active Problem List   Diagnosis     Attention deficit hyperactivity disorder (ADHD)     Pituitary dwarfism (H)     CARDIOVASCULAR SCREENING; LDL GOAL LESS THAN 160     Auditory hallucinations     MR (mental retardation), moderate     Past Surgical History:   Procedure Laterality Date     C NONSPECIFIC PROCEDURE      S/P bilateral PE tubes       Social History    Substance Use Topics     Smoking status: Passive Smoke Exposure - Never Smoker     Smokeless tobacco: Never Used      Comment: stepfather in the house     Alcohol use No     Family History   Problem Relation Age of Onset     Unknown/Adopted No family hx of      Asthma No family hx of      C.A.D. No family hx of      Diabetes No family hx of      Hypertension No family hx of          Current Outpatient Prescriptions   Medication Sig Dispense Refill     ACT ANTICAVITY FLUORIDE RINSE 0.05 % MT SOLN bid       BENZOYL PEROXIDE 5 % EX GEL APPLY TOPICALLY AT BEDTIME DAILY       diphenhydrAMINE (BENADRYL) 25 MG tablet Take 25 mg by mouth as needed for itching or allergies       Efinaconazole 10 % SOLN Externally apply topically daily Apply to toenail once daily for 48 weeks 8 mL 3     LAMICTAL 100 MG OR TABS 1 TABLET TWICE DAILY 60 0     mupirocin (BACTROBAN) 2 % cream Apply topically 3 times daily 15 g 1     OMEGA 3 1000 MG OR CAPS 2 CAPSULES ORALLY TWICE DAILY       SEROQUEL 200 MG OR TABS Take one tablet by mouth every evening.       STRATTERA 25 MG OR CAPS 1 tab 8 am 30 0     Allergies   Allergen Reactions     No Known Allergies      Recent Labs   Lab Test  02/23/17   1559  04/04/15   0944  09/29/14   1644  04/07/11   0904   LDL   --    --    --   141*   HDL   --    --    --   36*   TRIG   --    --    --   45   ALT   --    --   40  60   CR  0.77  0.85  0.78  0.92   GFRESTIMATED  >90  Non  GFR Calc    >90  Non  GFR Calc    >90  Non  GFR Calc    >90   GFRESTBLACK  >90   GFR Calc    >90   GFR Calc    >90   GFR Calc    >90   POTASSIUM  4.6  4.2  4.2  5.1   TSH   --    --    --   2.06        Reviewed and updated as needed this visit by clinical staff  Tobacco  Allergies  Meds  Med Hx  Surg Hx  Fam Hx  Soc Hx        Reviewed and updated as needed this visit by Provider        Past Medical History:   Diagnosis Date     Acne  "vulgaris      Allergies      Attention deficit disorder with hyperactivity(314.01)      Moderate intellectual disabilities      Photosensitive contact dermatitis      Pituitary dwarfism (H)       Past Surgical History:   Procedure Laterality Date     C NONSPECIFIC PROCEDURE      S/P bilateral PE tubes       Review of Systems  CONSTITUTIONAL: NEGATIVE for fever, chills, change in weight  INTEGUMENTARY/SKIN: NEGATIVE for worrisome rashes, moles or lesions  EYES: NEGATIVE for vision changes or irritation  ENT: NEGATIVE for ear, mouth and throat problems  RESP: NEGATIVE for significant cough or SOB  BREAST: NEGATIVE for masses, tenderness or discharge  CV: NEGATIVE for chest pain, palpitations or peripheral edema  GI: NEGATIVE for nausea, abdominal pain, heartburn, or change in bowel habits   male: negative for dysuria, hematuria, decreased urinary stream, erectile dysfunction, urethral discharge  MUSCULOSKELETAL: NEGATIVE for significant arthralgias or myalgia  NEURO: NEGATIVE for weakness, dizziness or paresthesias  ENDOCRINE: NEGATIVE for temperature intolerance, skin/hair changes  HEME/ALLERGY/IMMUNE: NEGATIVE for bleeding problems  PSYCHIATRIC: NEGATIVE for changes in mood or affect    OBJECTIVE:   /84  Pulse 77  Temp 97.7  F (36.5  C) (Tympanic)  Resp 16  Ht 5' 6\" (1.676 m)  Wt 163 lb (73.9 kg)  BMI 26.31 kg/m2    Physical Exam  GENERAL: healthy, alert and no distress  EYES: Eyes grossly normal to inspection, PERRL and conjunctivae and sclerae normal  HENT: ear canals and TM's normal, nose and mouth without ulcers or lesions  NECK: no adenopathy, no asymmetry, masses, or scars and thyroid normal to palpation  RESP: lungs clear to auscultation - no rales, rhonchi or wheezes  CV: regular rate and rhythm, normal S1 S2, no S3 or S4, no murmur, click or rub, no peripheral edema and peripheral pulses strong  ABDOMEN: soft, nontender, no hepatosplenomegaly, no masses and bowel sounds normal  MS: no gross " "musculoskeletal defects noted, no edema  SKIN: no suspicious lesions or rashes  NEURO: Normal strength and tone, mentation intact and speech normal  PSYCH: mentation appears normal, affect normal/bright    Diagnostic Test Results:  none     ASSESSMENT/PLAN:       ICD-10-CM    1. Routine history and physical examination of adult Z00.00    2. 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 Attention deficit hyperactivity disorder (ADHD), predominantly inattentive type F90.0    4. MR (mental retardation), moderate F71        COUNSELING:   Reviewed preventive health counseling, as reflected in patient instructions       Regular exercise       Healthy diet/nutrition       Vision screening    BP Readings from Last 1 Encounters:   06/26/18 132/84     Estimated body mass index is 26.31 kg/(m^2) as calculated from the following:    Height as of this encounter: 5' 6\" (1.676 m).    Weight as of this encounter: 163 lb (73.9 kg).    BP Screening:   Last 3 BP Readings:    BP Readings from Last 3 Encounters:   06/26/18 132/84   06/29/17 (!) 157/101   04/04/17 128/86       The following was recommended to the patient:  Re-screen BP within a year and recommended lifestyle modifications       reports that he is a non-smoker but has been exposed to tobacco smoke. He has never used smokeless tobacco.      Counseling Resources:  ATP IV Guidelines  Pooled Cohorts Equation Calculator  FRAX Risk Assessment  ICSI Preventive Guidelines  Dietary Guidelines for Americans, 2010  USDA's MyPlate  ASA Prophylaxis  Lung CA Screening    Jasson Kauffman PA-C  [facility] HealthcareAnswers for HPI/ROS submitted by the patient on 6/26/2018   PHQ-2 Score: 0  Answers for HPI/ROS submitted by the patient on 6/26/2018   PHQ-2 Score: 0    "

## 2018-06-26 NOTE — MR AVS SNAPSHOT
"              After Visit Summary   6/26/2018    Isaiah Dominguez    MRN: [id number]           Patient Information     Date Of Birth          1985        Visit Information        Provider Department      6/26/2018 10:00 AM Jasson Kauffman PA-C [facility]        Today's Diagnoses     Routine history and physical examination of adult    -  1    Pituitary dwarfism (H)        Attention deficit hyperactivity disorder (ADHD), predominantly inattentive type        MR (mental retardation), moderate           Follow-ups after your visit        Follow-up notes from your care team     Return in about 1 year (around 6/26/2019).      Who to contact     If you have questions or need follow up information about today's clinic visit or your schedule please contact [facility] directly at [phone].  Normal or non-critical lab and imaging results will be communicated to you by MyChart, letter or phone within 4 business days after the clinic has received the results. If you do not hear from us within 7 days, please contact the clinic through MyChart or phone. If you have a critical or abnormal lab result, we will notify you by phone as soon as possible.  Submit refill requests through [employer] or call your pharmacy and they will forward the refill request to us. Please allow 3 business days for your refill to be completed.          Additional Information About Your Visit        Care EveryWhere ID     This is your Care EveryWhere ID. This could be used by other organizations to access your [place] medical records  [id number]        Your Vitals Were     Pulse Temperature Respirations Height BMI (Body Mass Index)       77 97.7  F (36.5  C) (Tympanic) 16 5' 6\" (1.676 m) 26.31 kg/m2        Blood Pressure from Last 3 Encounters:   06/26/18 132/84   06/29/17 (!) 157/101   04/04/17 128/86    Weight from Last 3 Encounters:   06/26/18 163 lb (73.9 kg)   04/04/17 159 lb (72.1 kg)   02/23/17 159 lb " (72.1 kg)              Today, you had the following     No orders found for display       Primary Care Provider Fax #    Physician No Ref-Primary [phone]       No address on file        Equal Access to Services     GABRIELLA FREDRICK : Hadbeverly aad ku hadsammi Asher, gaurang lujanki, temi kayanick garcia, bernadette villedabess des. So [facility] [phone].    ATENCIÓN: Si habla español, tiene a boo disposición servicios gratuitos de asistencia lingüística. Llame al [phone].    We comply with applicable federal civil rights laws and [place] laws. We do not discriminate on the basis of race, color, national origin, age, disability, sex, sexual orientation, or gender identity.            Thank you!     Thank you for choosing [facility]  for your care. Our goal is always to provide you with excellent care. Hearing back from our patients is one way we can continue to improve our services. Please take a few minutes to complete the written survey that you may receive in the mail after your visit with us. Thank you!             Your Updated Medication List - Protect others around you: Learn how to safely use, store and throw away your medicines at www.disposemymeds.org.          This list is accurate as of 6/26/18 10:02 AM.  Always use your most recent med list.                   Brand Name Dispense Instructions for use Diagnosis    ACT ANTICAVITY FLUORIDE RINSE 0.05 % Soln   Generic drug:  Sodium Fluoride      bid        BENADRYL 25 MG tablet   Generic drug:  diphenhydrAMINE      Take 25 mg by mouth as needed for itching or allergies        benzoyl peroxide 5 % topical gel      APPLY TOPICALLY AT BEDTIME DAILY        Efinaconazole 10 % Soln     8 mL    Externally apply topically daily Apply to toenail once daily for 48 weeks    Fungal toenail infection       LAMICTAL 100 MG tablet   Generic drug:  lamoTRIgine     60    1 TABLET TWICE DAILY    Attention deficit disorder with  hyperactivity(314.01)       mupirocin 2 % cream    BACTROBAN    15 g    Apply topically 3 times daily    Impetigo       omega 3 1000 MG Caps      2 CAPSULES ORALLY TWICE DAILY        SEROQUEL 200 MG tablet   Generic drug:  QUEtiapine      Take one tablet by mouth every evening.        STRATTERA 25 MG capsule   Generic drug:  atomoxetine     30    1 tab 8 am    Attention deficit disorder with hyperactivity(314.01)

## 2018-09-27 ENCOUNTER — OFFICE VISIT (OUTPATIENT)
Dept: FAMILY MEDICINE | Facility: CLINIC | Age: 33
End: 2018-09-27
Payer: MEDICARE

## 2018-09-27 VITALS
DIASTOLIC BLOOD PRESSURE: 82 MMHG | OXYGEN SATURATION: 96 % | SYSTOLIC BLOOD PRESSURE: 128 MMHG | BODY MASS INDEX: 26.36 KG/M2 | WEIGHT: 164 LBS | TEMPERATURE: 99.5 F | HEART RATE: 68 BPM | HEIGHT: 66 IN

## 2018-09-27 DIAGNOSIS — M54.42 CHRONIC LOW BACK PAIN WITH LEFT-SIDED SCIATICA, UNSPECIFIED BACK PAIN LATERALITY: ICD-10-CM

## 2018-09-27 DIAGNOSIS — G89.29 CHRONIC LOW BACK PAIN WITH LEFT-SIDED SCIATICA, UNSPECIFIED BACK PAIN LATERALITY: ICD-10-CM

## 2018-09-27 DIAGNOSIS — Z23 NEED FOR PROPHYLACTIC VACCINATION AND INOCULATION AGAINST INFLUENZA: Primary | ICD-10-CM

## 2018-09-27 DIAGNOSIS — Z00.00 ENCOUNTER FOR ROUTINE ADULT HEALTH EXAMINATION WITHOUT ABNORMAL FINDINGS: ICD-10-CM

## 2018-09-27 PROCEDURE — 99213 OFFICE O/P EST LOW 20 MIN: CPT | Mod: 25 | Performed by: FAMILY MEDICINE

## 2018-09-27 PROCEDURE — 90686 IIV4 VACC NO PRSV 0.5 ML IM: CPT | Performed by: FAMILY MEDICINE

## 2018-09-27 PROCEDURE — G0008 ADMIN INFLUENZA VIRUS VAC: HCPCS | Performed by: FAMILY MEDICINE

## 2018-09-27 NOTE — PATIENT INSTRUCTIONS
1. YOUR ARE FINE FOR THE SPECIAL OLYMPICS    2. YOUR BACK EXAM IS NORMAL.    3. LETS JUST OBSERVE.  MAY WANT XRAY IF THIS PERISTS.  CALL IN TWO MONTHS.

## 2018-09-27 NOTE — NURSING NOTE
"Initial /82  Pulse 68  Temp 99.5  F (37.5  C) (Tympanic)  Ht 5' 6\" (1.676 m)  Wt 164 lb (74.4 kg)  SpO2 96%  BMI 26.47 kg/m2 Estimated body mass index is 26.47 kg/(m^2) as calculated from the following:    Height as of this encounter: 5' 6\" (1.676 m).    Weight as of this encounter: 164 lb (74.4 kg). .    Alexa Garcia CMA ([facility])  "

## 2018-09-27 NOTE — MR AVS SNAPSHOT
"              After Visit Summary   9/27/2018    Isaiah Dominguez    MRN: [id number]           Patient Information     Date Of Birth          1985        Visit Information        Provider Department      9/27/2018 3:40 PM Isaiah Veras MD [facility]        Today's Diagnoses     Need for prophylactic vaccination and inoculation against influenza    -  1    Chronic low back pain with left-sided sciatica, unspecified back pain laterality        Encounter for routine adult health examination without abnormal findings          Care Instructions    1. YOUR ARE FINE FOR THE SPECIAL OLYMPICS    2. YOUR BACK EXAM IS NORMAL.    3. LETS JUST OBSERVE.  MAY WANT XRAY IF THIS PERISTS.  CALL IN TWO MONTHS.           Follow-ups after your visit        Who to contact     If you have questions or need follow up information about today's clinic visit or your schedule please contact [facility] directly at [phone].  Normal or non-critical lab and imaging results will be communicated to you by MyChart, letter or phone within 4 business days after the clinic has received the results. If you do not hear from us within 7 days, please contact the clinic through MyChart or phone. If you have a critical or abnormal lab result, we will notify you by phone as soon as possible.  Submit refill requests through [employer] or call your pharmacy and they will forward the refill request to us. Please allow 3 business days for your refill to be completed.          Additional Information About Your Visit        Care EveryWhere ID     This is your Care EveryWhere ID. This could be used by other organizations to access your [place] medical records  [id number]        Your Vitals Were     Pulse Temperature Height Pulse Oximetry BMI (Body Mass Index)       68 99.5  F (37.5  C) (Tympanic) 5' 6\" (1.676 m) 96% 26.47 kg/m2        Blood Pressure from Last 3 Encounters:   09/27/18 128/82   06/26/18 " 132/84   06/29/17 (!) 157/101    Weight from Last 3 Encounters:   09/27/18 164 lb (74.4 kg)   06/26/18 163 lb (73.9 kg)   04/04/17 159 lb (72.1 kg)              We Performed the Following     FLU VACCINE, SPLIT VIRUS, IM (QUADRIVALENT) [54862]- >3 YRS     Vaccine Administration, Initial [[id number]]        Primary Care Provider Fax #    Physician No Ref-Primary [phone]       No address on file        Equal Access to Services     GABRIELLA ALCALA : Hadii aad ku hadasho Soomaali, waaxda luqadaha, qaybta kaalmada adeegyaganesh, bernadette singh . So [facility] [phone].    ATENCIÓN: Si habla español, tiene a boo disposición servicios gratuitos de asistencia lingüística. Llame al [phone].    We comply with applicable federal civil rights laws and [place] laws. We do not discriminate on the basis of race, color, national origin, age, disability, sex, sexual orientation, or gender identity.            Thank you!     Thank you for choosing [facility]  for your care. Our goal is always to provide you with excellent care. Hearing back from our patients is one way we can continue to improve our services. Please take a few minutes to complete the written survey that you may receive in the mail after your visit with us. Thank you!             Your Updated Medication List - Protect others around you: Learn how to safely use, store and throw away your medicines at www.disposemymeds.org.          This list is accurate as of 9/27/18  4:19 PM.  Always use your most recent med list.                   Brand Name Dispense Instructions for use Diagnosis    ACT ANTICAVITY FLUORIDE RINSE 0.05 % Soln   Generic drug:  Sodium Fluoride      bid        BENADRYL 25 MG tablet   Generic drug:  diphenhydrAMINE      Take 25 mg by mouth as needed for itching or allergies        benzoyl peroxide 5 % topical gel      APPLY TOPICALLY AT BEDTIME DAILY        Efinaconazole 10 % Soln     8 mL    Externally apply  topically daily Apply to toenail once daily for 48 weeks    Fungal toenail infection       LAMICTAL 100 MG tablet   Generic drug:  lamoTRIgine     60    1 TABLET TWICE DAILY    Attention deficit disorder with hyperactivity(314.01)       mupirocin 2 % cream    BACTROBAN    15 g    Apply topically 3 times daily    Impetigo       omega 3 1000 MG Caps      2 CAPSULES ORALLY TWICE DAILY        SEROQUEL 200 MG tablet   Generic drug:  QUEtiapine      Take one tablet by mouth every evening.        STRATTERA 25 MG capsule   Generic drug:  atomoxetine     30    1 tab 8 am    Attention deficit disorder with hyperactivity(314.01)

## 2018-09-27 NOTE — PROGRESS NOTES
SUBJECTIVE:   Isaiah Dominguez is a 33 year old male who presents to clinic today for the following health issues:    Back Pain   He is here for special [place]ics.  Back pain is muscular.       Duration: pt is unsure how long this has been going on.         Specific cause: none    Description:   Location of pain: low back bilateral  Character of pain: dull ache  Pain radiation:none  New numbness or weakness in legs, not attributed to pain:  no     Intensity: moderate    History:   Pain interferes with job: Not applicable  History of back problems: no prior back problems  Any previous MRI or X-rays: None  Sees a specialist for back pain:  No  Therapies tried without relief: None     Alleviating factors:   Improved by: None       Precipitating factors:  Worsened by: Bending and Walking          Accompanying Signs & Symptoms:  Risk of Fracture:  None  Risk of Cauda Equina:  None  Risk of Infection:  None  Risk of Cancer:  None  Risk of Ankylosing Spondylitis:  Onset at age <35, male, AND morning back stiffness. no                      Problem list and histories reviewed & adjusted, as indicated.  Additional history: as documented    Patient Active Problem List   Diagnosis     Attention deficit hyperactivity disorder (ADHD)     Pituitary dwarfism (H)     CARDIOVASCULAR SCREENING; LDL GOAL LESS THAN 160     Auditory hallucinations     MR (mental retardation), moderate     Past Surgical History:   Procedure Laterality Date     C NONSPECIFIC PROCEDURE      S/P bilateral PE tubes       Social History   Substance Use Topics     Smoking status: Passive Smoke Exposure - Never Smoker     Smokeless tobacco: Never Used      Comment: stepfather in the house     Alcohol use No     Family History   Problem Relation Age of Onset     Unknown/Adopted No family hx of      Asthma No family hx of      C.A.D. No family hx of      Diabetes No family hx of      Hypertension No family hx of          Current Outpatient Prescriptions  "  Medication Sig Dispense Refill     ACT ANTICAVITY FLUORIDE RINSE 0.05 % MT SOLN bid       BENZOYL PEROXIDE 5 % EX GEL APPLY TOPICALLY AT BEDTIME DAILY       diphenhydrAMINE (BENADRYL) 25 MG tablet Take 25 mg by mouth as needed for itching or allergies       Efinaconazole 10 % SOLN Externally apply topically daily Apply to toenail once daily for 48 weeks 8 mL 3     LAMICTAL 100 MG OR TABS 1 TABLET TWICE DAILY 60 0     mupirocin (BACTROBAN) 2 % cream Apply topically 3 times daily 15 g 1     OMEGA 3 1000 MG OR CAPS 2 CAPSULES ORALLY TWICE DAILY       SEROQUEL 200 MG OR TABS Take one tablet by mouth every evening.       STRATTERA 25 MG OR CAPS 1 tab 8 am 30 0       Reviewed and updated as needed this visit by clinical staff       Reviewed and updated as needed this visit by Provider         ROS:  CONSTITUTIONAL: NEGATIVE for fever, chills, change in weight  ENT/MOUTH: NEGATIVE for ear, mouth and throat problems  RESP: NEGATIVE for significant cough or SOB  CV: NEGATIVE for chest pain, palpitations or peripheral edema    OBJECTIVE:     /82  Pulse 68  Temp 99.5  F (37.5  C) (Tympanic)  Ht 5' 6\" (1.676 m)  Wt 164 lb (74.4 kg)  SpO2 96%  BMI 26.47 kg/m2  Body mass index is 26.47 kg/(m^2).  GENERAL: healthy, alert and no distress  NECK: no adenopathy, no asymmetry, masses, or scars and thyroid normal to palpation  RESP: lungs clear to auscultation - no rales, rhonchi or wheezes  CV: regular rate and rhythm, normal S1 S2, no S3 or S4, no murmur, click or rub, no peripheral edema and peripheral pulses strong  ABDOMEN: soft, nontender, no hepatosplenomegaly, no masses and bowel sounds normal  MS: no gross musculoskeletal defects noted, no edema  BACK   SOME LOW LUMBAR TENDERNESS  NORMAL SLR AND DTR.        ASSESSMENT/PLAN:             1. Need for prophylactic vaccination and inoculation against influenza    - FLU VACCINE, SPLIT VIRUS, IM (QUADRIVALENT) [62413]- >3 YRS  - Vaccine Administration, Initial " [32558]  ASSESSMENT/PLAN:      ICD-10-CM    1. Need for prophylactic vaccination and inoculation against influenza Z23 FLU VACCINE, SPLIT VIRUS, IM (QUADRIVALENT) [79865]- >3 YRS     Vaccine Administration, Initial [64707]   2. Chronic low back pain with left-sided sciatica, unspecified back pain laterality M54.42     G89.29    3. Encounter for routine adult health examination without abnormal findings Z00.00        There are no Patient Instructions on file for this visit.        Isaiah Veras MD  [facility]      Injectable Influenza Immunization Documentation    1.  Is the person to be vaccinated sick today?   No    2. Does the person to be vaccinated have an allergy to a component   of the vaccine?   No  Egg Allergy Algorithm Link    3. Has the person to be vaccinated ever had a serious reaction   to influenza vaccine in the past?   No    4. Has the person to be vaccinated ever had Guillain-Barré syndrome?   No    Form completed by Alexa Garcia CMA ([facility])

## 2019-04-03 ENCOUNTER — OFFICE VISIT (OUTPATIENT)
Dept: FAMILY MEDICINE | Facility: CLINIC | Age: 34
End: 2019-04-03
Payer: MEDICARE

## 2019-04-03 VITALS
SYSTOLIC BLOOD PRESSURE: 132 MMHG | DIASTOLIC BLOOD PRESSURE: 80 MMHG | HEIGHT: 66 IN | BODY MASS INDEX: 26.2 KG/M2 | WEIGHT: 163 LBS | TEMPERATURE: 97.4 F | HEART RATE: 80 BPM

## 2019-04-03 DIAGNOSIS — T78.40XA ALLERGIC REACTION, INITIAL ENCOUNTER: ICD-10-CM

## 2019-04-03 DIAGNOSIS — M43.9 SPINE CURVATURE, ACQUIRED: Primary | ICD-10-CM

## 2019-04-03 DIAGNOSIS — M40.00 ACQUIRED POSTURAL KYPHOSIS: ICD-10-CM

## 2019-04-03 PROCEDURE — 99213 OFFICE O/P EST LOW 20 MIN: CPT | Performed by: NURSE PRACTITIONER

## 2019-04-03 RX ORDER — DIPHENHYDRAMINE HCL 25 MG
25 TABLET ORAL EVERY 8 HOURS PRN
Qty: 60 TABLET | Refills: 1 | Status: SHIPPED | OUTPATIENT
Start: 2019-04-03

## 2019-04-03 ASSESSMENT — MIFFLIN-ST. JEOR: SCORE: 1632.11

## 2019-04-03 NOTE — PROGRESS NOTES
SUBJECTIVE:   Isaiah Dominguez is a 33 year old male who presents to clinic today for the following health issues:    Patient's  thought that his back seems to be more arched than it was before so they would like it checked. The patient states that his back has not been hurting or bothering him.        Problem list and histories reviewed & adjusted, as indicated.  Additional history: as documented    Patient Active Problem List   Diagnosis     Attention deficit hyperactivity disorder (ADHD)     Pituitary dwarfism (H)     CARDIOVASCULAR SCREENING; LDL GOAL LESS THAN 160     Auditory hallucinations     MR (mental retardation), moderate     Past Surgical History:   Procedure Laterality Date     C NONSPECIFIC PROCEDURE      S/P bilateral PE tubes       Social History     Tobacco Use     Smoking status: Passive Smoke Exposure - Never Smoker     Smokeless tobacco: Never Used     Tobacco comment: stepfather in the house   Substance Use Topics     Alcohol use: No     Family History   Problem Relation Age of Onset     Unknown/Adopted No family hx of      Asthma No family hx of      C.A.D. No family hx of      Diabetes No family hx of      Hypertension No family hx of          Current Outpatient Medications   Medication Sig Dispense Refill     ACT ANTICAVITY FLUORIDE RINSE 0.05 % MT SOLN bid       BENZOYL PEROXIDE 5 % EX GEL APPLY TOPICALLY AT BEDTIME DAILY       diphenhydrAMINE (BENADRYL) 25 MG tablet Take 25 mg by mouth as needed for itching or allergies       Efinaconazole 10 % SOLN Externally apply topically daily Apply to toenail once daily for 48 weeks 8 mL 3     LAMICTAL 100 MG OR TABS 1 TABLET TWICE DAILY 60 0     mupirocin (BACTROBAN) 2 % cream Apply topically 3 times daily 15 g 1     OMEGA 3 1000 MG OR CAPS 2 CAPSULES ORALLY TWICE DAILY       SEROQUEL 200 MG OR TABS Take one tablet by mouth every evening.       STRATTERA 25 MG OR CAPS 1 tab 8 am 30 0     Allergies   Allergen Reactions     No Known  "Allergies      Recent Labs   Lab Test 02/23/17  1559 04/04/15  0944 09/29/14  1644 04/07/11  0904   LDL  --   --   --  141*   HDL  --   --   --  36*   TRIG  --   --   --  45   ALT  --   --  40 60   CR 0.77 0.85 0.78 0.92   GFRESTIMATED >90  Non  GFR Calc   >90  Non  GFR Calc   >90  Non  GFR Calc   >90   GFRESTBLACK >90   GFR Calc   >90   GFR Calc   >90   GFR Calc   >90   POTASSIUM 4.6 4.2 4.2 5.1   TSH  --   --   --  2.06      BP Readings from Last 3 Encounters:   04/03/19 132/80   09/27/18 128/82   06/26/18 132/84    Wt Readings from Last 3 Encounters:   04/03/19 73.9 kg (163 lb)   09/27/18 74.4 kg (164 lb)   06/26/18 73.9 kg (163 lb)             Reviewed and updated as needed this visit by clinical staff       Reviewed and updated as needed this visit by Provider         ROS:  Constitutional, HEENT, cardiovascular, pulmonary, gi and gu systems are negative, except as otherwise noted.    OBJECTIVE:     /80 (BP Location: Right arm, Cuff Size: Adult Regular)   Pulse 80   Temp 97.4  F (36.3  C) (Tympanic)   Ht 1.676 m (5' 6\")   Wt 73.9 kg (163 lb)   BMI 26.31 kg/m    Body mass index is 26.31 kg/m .  GENERAL: healthy, alert and no distress  EYES: Eyes grossly normal to inspection, PERRL and conjunctivae and sclerae normal  RESP: lungs clear to auscultation - no rales, rhonchi or wheezes  CV: regular rate and rhythm, normal S1 S2, no S3 or S4, no murmur, click or rub, no peripheral edema and peripheral pulses strong  ABDOMEN: soft, nontender, no hepatosplenomegaly, no masses and bowel sounds normal  MS: no gross musculoskeletal defects noted, no edema  MS: Increased curvature to the upper cervical spine  SKIN: no suspicious lesions or rashes  NEURO: Normal strength and tone, mentation intact and speech normal  PSYCH: mentation appears normal, affect normal/bright      ASSESSMENT/PLAN:   (M43.9) Spine curvature, " acquired  (primary encounter diagnosis)  Comment: We will obtain scoliosis films patient will have those done in [place].  Plan: XR Spine Complete Scoliosis 2 Views      (T78.40XA) Allergic reaction, initial encounter  Comment:   Plan: diphenhydrAMINE (BENADRYL) 25 MG tablet    MARYCRUZ [facility]

## 2019-04-03 NOTE — PATIENT INSTRUCTIONS
To set up appointment for xray at [phone]  Patient Education     Anatomy of a Normal Spine  The spinal column is a stack of bones (vertebrae) that are  by soft pads of tissue (disks). In the middle of each of these bones there is a canal that runs top to bottom. Together these canals form a tunnel called the spinal canal. Running through the spinal canal is a long sausage-like bundle of nerves and nerve cells called the spinal cord. These nerve fibers carry signals between the brain and body. The spinal cord is surrounded by the cerebrospinal fluid and protective layers called meninges, just like the brain.     The spine has three natural curves: the cervical, the thoracic, and the lumbar.   The parts of the spine  The spine is made up of the following parts:    The vertebrae (not including the sacrum and coccyx) are the 24 bones that connect like puzzle pieces to make up the spine.    The lamina of each vertebra forms the back of the spinal canal.    A foramen is a small bony opening. This is where a nerve, on each side of the spinal cord, leaves the spinal canal.    The transverse process is the wing of bone on either side of each vertebra.    The spinous process is the back part of each vertebra you can feel through your skin.    A disk lies between each of the vertebrae and acts as a cushion.     Two vertebrae with a disk between them   Date Last Reviewed: 5/1/2018 2000-2018 [employer]. [address] [place] [id number]. All rights reserved. This information is not intended as a substitute for professional medical care. Always follow your healthcare professional's instructions.

## 2019-04-08 ENCOUNTER — ANCILLARY PROCEDURE (OUTPATIENT)
Dept: GENERAL RADIOLOGY | Facility: CLINIC | Age: 34
End: 2019-04-08
Payer: MEDICARE

## 2019-04-08 DIAGNOSIS — M43.9 SPINE CURVATURE, ACQUIRED: ICD-10-CM

## 2019-04-08 PROCEDURE — 72082 X-RAY EXAM ENTIRE SPI 2/3 VW: CPT | Mod: FY

## 2019-04-10 NOTE — RESULT ENCOUNTER NOTE
Please Notify Isaiah Ferguson  of test results x-ray does not show scoliosis but does show increased kyphosis which is increased curvature of the middle back.  Based on findings of x-ray recommend that patient start physical therapy for strengthening exercises.  I have placed a referral for physical therapy patient is to follow-up with physical therapy.  They will contact you for an appointment    Mercedes Donovan CNP

## 2019-04-25 ENCOUNTER — HOSPITAL ENCOUNTER (OUTPATIENT)
Dept: PHYSICAL THERAPY | Facility: CLINIC | Age: 34
Setting detail: THERAPIES SERIES
End: 2019-04-25
Attending: NURSE PRACTITIONER
Payer: MEDICARE

## 2019-04-25 DIAGNOSIS — M40.00 ACQUIRED POSTURAL KYPHOSIS: ICD-10-CM

## 2019-04-25 PROCEDURE — 97110 THERAPEUTIC EXERCISES: CPT | Mod: GP | Performed by: PHYSICAL THERAPIST

## 2019-04-25 PROCEDURE — 97161 PT EVAL LOW COMPLEX 20 MIN: CPT | Mod: GP | Performed by: PHYSICAL THERAPIST

## 2019-04-25 NOTE — PROGRESS NOTES
04/25/19 1600   General Information   Type of Visit Initial OP Ortho PT Evaluation   Start of Care Date 04/25/19   Referring Physician Mercedes Donovan NP   Patient/Family Goals Statement learn exercises    Orders Evaluate and Treat   Date of Order 04/03/19   Insurance Type Medicare;[facility]   Medical Diagnosis Acquired postural kyphosis   Surgical/Medical history reviewed Yes  (ADHD, mod intellectual disability, pituitary dwarfism)   Precautions/Limitations no known precautions/limitations   Body Part(s)   Body Part(s) Lumbar Spine/SI;Cervical Spine   Presentation and Etiology   Pertinent history of current problem (include personal factors and/or comorbidities that impact the POC) Pt notes that was at basketball and  mentioned that it seemed like the curvature of back may be getting worse. Did get an Xray- but did not have previous X ray to compare to.  Not having pain, is very active, plays basketball etc.  Here at session with PCA. Not sure wants PT, denies pain but willing to get exercises to work at home.    Impairments E. Decreased flexibility   Functional Limitations perform activities of daily living;perform desired leisure / sports activities   Symptom Location mid back   How/Where did it occur From insidious onset   Onset date of current episode/exacerbation 04/03/19   Chronicity Chronic   Pain rating (0-10 point scale) Denies pain   Current / Previous Interventions   Diagnostic Tests: X-ray   X-ray Results Results   X-ray results thoracolumbar kyphosis, mild degenerative changes   Current Level of Function   Patient role/employment history G. Disabled   Fall Risk Screen   Have you fallen 2 or more times in the past year? No   Have you fallen and had an injury in the past year? No   Is patient a fall risk? No   Abuse Screen (yes response referral indicated)   Feels Unsafe at Home or Work/School no   Feels Threatened by Someone no   Does Anyone Try to Keep You From Having Contact with Others or Doing  Things Outside Your Home? no   Physical Signs of Abuse Present no   System Outcome Measures   Outcome Measures Low Back Pain (see Oswestry and Damir)   Lumbar Spine/SI Objective Findings   Flexion ROM lumbar flexion fingertips to floor   Extension ROM extension to nuetral- does not reverse thoracic spine curve   Right Side Bending ROM fingertips to knee joint line   Left Side Bending ROM fingertips to knee joint line   Cervical Spine   Posture poor resting posture, thoraic kyphosis, forward head, rounded shouldesr   Cervical Flexion ROM chin to chest   Cervical Extension ROM limited 50%   Cervical Right Rotation ROM Rotation B ~ 70   Thoracic Flexion %   Thoracic Extension ROM unable to extend to neutral   Shoulder AROM Screen AROM WFL, unable obtain full flexion due to kyphosis/rounded shoulders posture   Shoulder/Wrist/Hand Strength Comments Gross UE strength 5/5   Cervical/Shoulder Special Tests Comments decreased flexibility pecs B, as well as hip flexors bilaterally.    Segmental Mobility-Thoracic hypomobility thoracic spine   Palpation no tenderness with palpation   Planned Therapy Interventions   Planned Therapy Interventions ROM;strengthening;stretching;neuromuscular re-education;manual therapy;joint mobilization   Clinical Impression   Criteria for Skilled Therapeutic Interventions Met yes, treatment indicated   PT Diagnosis thoracic kyphosis, poor posture   Influenced by the following impairments decreased ROM/flexibility/mobility, decreased strength postural muscles   Functional limitations due to impairments poor posture   Clinical Presentation Stable/Uncomplicated   Clinical Presentation Rationale motivated patient, painfree   Clinical Decision Making (Complexity) Low complexity   Therapy Frequency other (see comments)  (1 time visit)   Predicted Duration of Therapy Intervention (days/wks) 1 time visit   Risk & Benefits of therapy have been explained Yes   Patient, Family & other staff in  agreement with plan of care Yes   Clinical Impression Comments Pt presents with concerns for curvature of spine- due to acquired kyphosis. Not having any pain and able to remain active in multiple sports. Is appropriate for interevntion to provide education and stretching/strengthening plan for long term management.    Education Assessment   Preferred Learning Style Demonstration;Pictures/video   Barriers to Learning No barriers   ORTHO GOALS   PT Ortho Eval Goals 1;2   Ortho Goal 1   Goal Identifier 1   Goal Description Patient will be able to demonstrate HEP with assistance from staff   Target Date 04/25/19   Date Met 04/25/19   Ortho Goal 2   Goal Identifier 2   Goal Description Patient will demonstrate improved resting posture    Target Date 05/23/19   Total Evaluation Time   PT Eval, Low Complexity Minutes (32608) 20   Therapy Certification   Certification date from 04/25/19   Certification date to 05/23/19   Medical Diagnosis acquired postural kyphosis

## 2019-04-25 NOTE — PROGRESS NOTES
[facility]          OUTPATIENT PHYSICAL THERAPY ORTHOPEDIC EVALUATION  PLAN OF TREATMENT FOR OUTPATIENT REHABILITATION  (COMPLETE FOR INITIAL CLAIMS ONLY)  Patient's Last Name, First Name, M.I.  YOB: 1985  Isaiah Dominguez Jr  MIKE    Provider s Name:  [facility]   Medical Record No.  [id number]   Start of Care Date:  04/25/19   Onset Date:  04/03/19   Type:     _X__PT   ___OT   ___SLP Medical Diagnosis:  acquired postural kyphosis     PT Diagnosis:  thoracic kyphosis, poor posture   Visits from SOC:  1      _________________________________________________________________________________  Plan of Treatment/Functional Goals:  ROM, strengthening, stretching, neuromuscular re-education, manual therapy, joint mobilization           Goals  Goal Identifier: 1  Goal Description: Patient will be able to demonstrate HEP with assistance from staff  Target Date: 04/25/19    Goal Identifier: 2  Goal Description: Patient will demonstrate improved resting posture   Target Date: 05/23/19            Therapy Frequency:  other (see comments)(1 time visit)  Predicted Duration of Therapy Intervention:  1 time visit    Annie Villalobos PT                 I CERTIFY THE NEED FOR THESE SERVICES FURNISHED UNDER        THIS PLAN OF TREATMENT AND WHILE UNDER MY CARE     (Physician co-signature of this document indicates review and certification of the therapy plan).                       Certification Date From:  04/25/19   Certification Date To:  05/23/19    Referring Provider:  Mercedes Donovan NP    Initial Assessment        See Epic Evaluation Start of Care Date: 04/25/19

## 2019-05-14 DIAGNOSIS — L70.9 ACNE: Primary | ICD-10-CM

## 2019-05-14 RX ORDER — BENZOYL PEROXIDE 5 G/100G
GEL TOPICAL
Qty: 60 G | Refills: 3 | Status: SHIPPED | OUTPATIENT
Start: 2019-05-14 | End: 2019-08-19

## 2019-05-14 NOTE — TELEPHONE ENCOUNTER
"Requested Prescriptions   Pending Prescriptions Disp Refills     benzoyl peroxide 5 % topical gel         Topical Acne Medications Protocol Passed - 5/14/2019  8:59 AM        Passed - Patient is 12 years of age or older        Passed - Recent (12 mo) or future (30 days) visit within the authorizing provider's specialty     Patient had office visit in the last 12 months or has a visit in the next 30 days with authorizing provider or within the authorizing provider's specialty.  See \"Patient Info\" tab in inbasket, or \"Choose Columns\" in Meds & Orders section of the refill encounter.              Passed - Medication is active on med list        Last Written Prescription Date:  2/12/2009  Patient Reported   Last Fill Quantity: 60,  # refills: 11   Last office visit: 4/3/2019 with prescribing provider:  Zion   Future Office Visit:      "

## 2019-05-22 NOTE — ADDENDUM NOTE
Encounter addended by: Annie Villalobos PT on: 5/22/2019 7:49 AM   Actions taken: Sign clinical note, Flowsheet accepted, Episode resolved

## 2019-05-22 NOTE — PROGRESS NOTES
Patient seen for planned 1 time visit and was given recommendations and instructed on home exercise program. Let initial evaluation also serve as discharge summary. Discharge from PT services at this time for this episode of treatment. Please see attached documentation under this episode of care for further information including dates of service, start of care date, referring physician, Dx, treatment plan, treatments, etc.    Please contact me with any questions or concerns.    Thank you for your referral.    Annie Villalobos, PT, DPT  Physical Therapist   [facility]  [phone]

## 2019-06-22 DIAGNOSIS — Z79.899 ENCOUNTER FOR LONG-TERM (CURRENT) USE OF OTHER MEDICATIONS: Primary | ICD-10-CM

## 2019-06-22 LAB
CHOLEST SERPL-MCNC: 229 MG/DL
GLUCOSE SERPL-MCNC: 98 MG/DL (ref 70–99)
HDLC SERPL-MCNC: 42 MG/DL
LDLC SERPL CALC-MCNC: 171 MG/DL
NONHDLC SERPL-MCNC: 187 MG/DL
TRIGL SERPL-MCNC: 82 MG/DL

## 2019-06-22 PROCEDURE — 82947 ASSAY GLUCOSE BLOOD QUANT: CPT | Performed by: NURSE PRACTITIONER

## 2019-06-22 PROCEDURE — 36415 COLL VENOUS BLD VENIPUNCTURE: CPT | Performed by: NURSE PRACTITIONER

## 2019-06-22 PROCEDURE — 80061 LIPID PANEL: CPT | Performed by: NURSE PRACTITIONER

## 2019-08-19 DIAGNOSIS — L70.9 ACNE: ICD-10-CM

## 2019-08-19 RX ORDER — METHOCARBAMOL 750 MG/1
TABLET ORAL
Qty: 42.5 G | Refills: 11 | Status: SHIPPED | OUTPATIENT
Start: 2019-08-19 | End: 2020-04-27

## 2019-08-19 NOTE — TELEPHONE ENCOUNTER
"Requested Prescriptions   Pending Prescriptions Disp Refills     ACNE MEDICATION 5 5 % topical gel [Pharmacy Med Name: BENZOYL PEROXIDE 5% GEL] 42.5 g 11     Sig: APPLY TOPICALLY AT BEDTIME       Topical Acne Medications Protocol Passed - 8/19/2019 10:58 AM        Passed - Patient is 12 years of age or older        Passed - Recent (12 mo) or future (30 days) visit within the authorizing provider's specialty     Patient had office visit in the last 12 months or has a visit in the next 30 days with authorizing provider or within the authorizing provider's specialty.  See \"Patient Info\" tab in inbasket, or \"Choose Columns\" in Meds & Orders section of the refill encounter.              Passed - Medication is active on med list        benzoyl peroxide 5 % topical gel  Last Written Prescription Date:  05/14/2019  Last Fill Quantity: 60g,  # refills: 3   Last office visit: 4/3/2019 with prescribing provider:  AMPARO Donovan   Future Office Visit:      Sonam MARADIAGA (R) (M)    "

## 2019-12-23 ENCOUNTER — IMMUNIZATION (OUTPATIENT)
Dept: FAMILY MEDICINE | Facility: CLINIC | Age: 34
End: 2019-12-23
Payer: MEDICARE

## 2019-12-23 PROCEDURE — 90686 IIV4 VACC NO PRSV 0.5 ML IM: CPT

## 2019-12-23 PROCEDURE — G0008 ADMIN INFLUENZA VIRUS VAC: HCPCS

## 2020-01-21 ENCOUNTER — OFFICE VISIT (OUTPATIENT)
Dept: FAMILY MEDICINE | Facility: CLINIC | Age: 35
End: 2020-01-21
Payer: MEDICARE

## 2020-01-21 VITALS
DIASTOLIC BLOOD PRESSURE: 76 MMHG | BODY MASS INDEX: 27.97 KG/M2 | RESPIRATION RATE: 18 BRPM | HEART RATE: 76 BPM | HEIGHT: 66 IN | TEMPERATURE: 98.4 F | SYSTOLIC BLOOD PRESSURE: 128 MMHG | WEIGHT: 174 LBS

## 2020-01-21 DIAGNOSIS — Z00.00 ROUTINE HISTORY AND PHYSICAL EXAMINATION OF ADULT: Primary | ICD-10-CM

## 2020-01-21 PROCEDURE — G0438 PPPS, INITIAL VISIT: HCPCS | Performed by: NURSE PRACTITIONER

## 2020-01-21 ASSESSMENT — ENCOUNTER SYMPTOMS
HEADACHES: 0
HEMATOCHEZIA: 0
FEVER: 0
PALPITATIONS: 0
EYE PAIN: 0
PARESTHESIAS: 0
NERVOUS/ANXIOUS: 0
JOINT SWELLING: 0
CHILLS: 0
CONSTIPATION: 0
DIARRHEA: 0
COUGH: 0
ARTHRALGIAS: 0
SORE THROAT: 0
MYALGIAS: 0
HEARTBURN: 0
NAUSEA: 0
WEAKNESS: 0
HEMATURIA: 0
DIZZINESS: 0
ABDOMINAL PAIN: 0
FREQUENCY: 0
DYSURIA: 0
SHORTNESS OF BREATH: 0

## 2020-01-21 ASSESSMENT — ACTIVITIES OF DAILY LIVING (ADL)
CURRENT_FUNCTION: TRANSPORTATION REQUIRES ASSISTANCE
CURRENT_FUNCTION: PREPARING MEALS REQUIRES ASSISTANCE
CURRENT_FUNCTION: MEDICATION ADMINISTRATION REQUIRES ASSISTANCE
CURRENT_FUNCTION: NO ASSISTANCE NEEDED
CURRENT_FUNCTION: MONEY MANAGEMENT REQUIRES ASSISTANCE

## 2020-01-21 ASSESSMENT — MIFFLIN-ST. JEOR: SCORE: 1677.01

## 2020-01-21 NOTE — PROGRESS NOTES
"SUBJECTIVE:   Isaiah Dominguez is a 34 year old male who presents for Preventive Visit.    Are you in the first 12 months of your Medicare coverage?  No    Healthy Habits:     In general, how would you rate your overall health?  Excellent    Frequency of exercise:  6-7 days/week    Duration of exercise:  Less than 15 minutes    Do you usually eat at least 4 servings of fruit and vegetables a day, include whole grains    & fiber and avoid regularly eating high fat or \"junk\" foods?  Yes    Taking medications regularly:  Yes    Medication side effects:  None    Ability to successfully perform activities of daily living:  Transportation requires assistance, preparing meals requires assistance, medication administration requires assistance, money management requires assistance and no assistance needed    Home Safety:  Throw rugs in the hallway and no safety concerns identified    Hearing Impairment:  No hearing concerns    In the past 6 months, have you been bothered by leaking of urine?  No    In general, how would you rate your overall mental or emotional health?  Excellent      PHQ-2 Total Score: 0    Additional concerns today:  No    Do you feel safe in your environment? Yes    Have you ever done Advance Care Planning? (For example, a Health Directive, POLST, or a discussion with a medical provider or your loved ones about your wishes): No, advance care planning information given to patient to review.  Advanced care planning was discussed at today's visit.      Fall risk  Fallen 2 or more times in the past year?: No  Any fall with injury in the past year?: No    Cognitive Screening Not appropriate due to mental handicap    Do you have sleep apnea, excessive snoring or daytime drowsiness?: yes    Reviewed and updated as needed this visit by clinical staff         Reviewed and updated as needed this visit by Provider        Social History     Tobacco Use     Smoking status: Passive Smoke Exposure - Never Smoker     " Smokeless tobacco: Never Used     Tobacco comment: stepfather in the house   Substance Use Topics     Alcohol use: No     If you drink alcohol do you typically have >3 drinks per day or >7 drinks per week? No    Alcohol Use 1/21/2020   Prescreen: >3 drinks/day or >7 drinks/week? No   Prescreen: >3 drinks/day or >7 drinks/week? -           Current providers sharing in care for this patient include:   Patient Care Team:  No Ref-Primary, Physician as PCP - Mercedes Bell APRN CNP as Assigned PCP    The following health maintenance items are reviewed in Epic and correct as of today:  Health Maintenance   Topic Date Due     MEDICARE ANNUAL WELLNESS VISIT  06/26/2019     PHQ-2  01/01/2020     DTAP/TDAP/TD IMMUNIZATION (7 - Td) 03/22/2020     HIV SCREENING  Completed     INFLUENZA VACCINE  Completed     IPV IMMUNIZATION  Completed     MENINGITIS IMMUNIZATION  Aged Out     BP Readings from Last 3 Encounters:   01/21/20 128/76   04/03/19 132/80   09/27/18 128/82    Wt Readings from Last 3 Encounters:   01/21/20 78.9 kg (174 lb)   04/03/19 73.9 kg (163 lb)   09/27/18 74.4 kg (164 lb)                  Patient Active Problem List   Diagnosis     Attention deficit hyperactivity disorder (ADHD)     Pituitary dwarfism (H)     CARDIOVASCULAR SCREENING; LDL GOAL LESS THAN 160     Auditory hallucinations     MR (mental retardation), moderate     Past Surgical History:   Procedure Laterality Date     C NONSPECIFIC PROCEDURE      S/P bilateral PE tubes       Social History     Tobacco Use     Smoking status: Passive Smoke Exposure - Never Smoker     Smokeless tobacco: Never Used     Tobacco comment: stepfather in the house   Substance Use Topics     Alcohol use: No     Family History   Problem Relation Age of Onset     Unknown/Adopted No family hx of      Asthma No family hx of      C.A.D. No family hx of      Diabetes No family hx of      Hypertension No family hx of          Current Outpatient Medications   Medication Sig  Dispense Refill     ACNE MEDICATION 5 5 % topical gel APPLY TOPICALLY AT BEDTIME 42.5 g 11     ACT ANTICAVITY FLUORIDE RINSE 0.05 % MT SOLN bid       diphenhydrAMINE (BENADRYL) 25 MG tablet Take 1 tablet (25 mg) by mouth every 8 hours as needed for itching or allergies 60 tablet 1     Efinaconazole 10 % SOLN Externally apply topically daily Apply to toenail once daily for 48 weeks 8 mL 3     LAMICTAL 100 MG OR TABS 1 TABLET TWICE DAILY 60 0     mupirocin (BACTROBAN) 2 % cream Apply topically 3 times daily 15 g 1     OMEGA 3 1000 MG OR CAPS 2 CAPSULES ORALLY TWICE DAILY       SEROQUEL 200 MG OR TABS Take one tablet by mouth every evening.       STRATTERA 25 MG OR CAPS 1 tab 8 am 30 0     Allergies   Allergen Reactions     No Known Allergies      Recent Labs   Lab Test 06/22/19  0940 02/23/17  1559 04/04/15  0944 09/29/14  1644   *  --   --   --    HDL 42  --   --   --    TRIG 82  --   --   --    ALT  --   --   --  40   CR  --  0.77 0.85 0.78   GFRESTIMATED  --  >90  Non  GFR Calc   >90  Non  GFR Calc   >90  Non  GFR Calc     GFRESTBLACK  --  >90   GFR Calc   >90   GFR Calc   >90   GFR Calc     POTASSIUM  --  4.6 4.2 4.2          Review of Systems   Constitutional: Negative for chills and fever.   HENT: Negative for congestion, ear pain, hearing loss and sore throat.    Eyes: Negative for pain and visual disturbance.   Respiratory: Negative for cough and shortness of breath.    Cardiovascular: Negative for chest pain, palpitations and peripheral edema.   Gastrointestinal: Negative for abdominal pain, constipation, diarrhea, heartburn, hematochezia and nausea.   Genitourinary: Negative for dysuria, frequency, genital sores, hematuria, impotence and urgency.   Musculoskeletal: Negative for arthralgias, joint swelling and myalgias.   Skin: Negative for rash.   Neurological: Negative for dizziness, weakness, headaches  "and paresthesias.   Psychiatric/Behavioral: Negative for mood changes. The patient is not nervous/anxious.          OBJECTIVE:   /76 (BP Location: Right arm, Cuff Size: Adult Regular)   Pulse 76   Temp 98.4  F (36.9  C) (Tympanic)   Resp 18   Ht 1.676 m (5' 6\")   Wt 78.9 kg (174 lb)   BMI 28.08 kg/m   Estimated body mass index is 26.31 kg/m  as calculated from the following:    Height as of 4/3/19: 1.676 m (5' 6\").    Weight as of 4/3/19: 73.9 kg (163 lb).  Physical Exam  GENERAL: healthy, alert and no distress  EYES: Eyes grossly normal to inspection, PERRL and conjunctivae and sclerae normal  HENT: ear canals and TM's normal, nose and mouth without ulcers or lesions  NECK: no adenopathy, no asymmetry, masses, or scars and thyroid normal to palpation  RESP: lungs clear to auscultation - no rales, rhonchi or wheezes  CV: regular rate and rhythm, normal S1 S2, no S3 or S4, no murmur, click or rub, no peripheral edema and peripheral pulses strong  ABDOMEN: soft, nontender, no hepatosplenomegaly, no masses and bowel sounds normal  MS: no gross musculoskeletal defects noted, no edema  SKIN: no suspicious lesions or rashes  NEURO: Normal strength and tone, mentation intact and speech normal  PSYCH: mentation appears normal, affect normal/bright        ASSESSMENT / PLAN:   (Z00.00) Routine history and physical examination of adult  (primary encounter diagnosis)  Comment: Completed today patient will have labs drawn in the future is nonfasting  Plan: Lipid panel reflex to direct LDL Fasting, **CBC        with platelets FUTURE anytime, **Basic         metabolic panel FUTURE anytime      COUNSELING:  Reviewed preventive health counseling, as reflected in patient instructions       Regular exercise       Healthy diet/nutrition       Vision screening       Hearing screening       Dental care       Fall risk prevention    Estimated body mass index is 26.31 kg/m  as calculated from the following:    Height as of " "4/3/19: 1.676 m (5' 6\").    Weight as of 4/3/19: 73.9 kg (163 lb).         reports that he is a non-smoker but has been exposed to tobacco smoke. He has never used smokeless tobacco.      Appropriate preventive services were discussed with this patient, including applicable screening as appropriate for cardiovascular disease, diabetes, osteopenia/osteoporosis, and glaucoma.  As appropriate for age/gender, discussed screening for colorectal cancer, prostate cancer, breast cancer, and cervical cancer. Checklist reviewing preventive services available has been given to the patient.    Reviewed patients plan of care and provided an AVS. The Basic Care Plan (routine screening as documented in Health Maintenance) for Isaiah Ferguson meets the Care Plan requirement. This Care Plan has been established and reviewed with the Patient and caregiver.    Counseling Resources:  ATP IV Guidelines  Pooled Cohorts Equation Calculator  Breast Cancer Risk Calculator  FRAX Risk Assessment  ICSI Preventive Guidelines  Dietary Guidelines for Americans, 2010  USDA's MyPlate  ASA Prophylaxis  Lung CA Screening    MARYCRUZ Calhoun CNP  [facility]    Identified Health Risks:  "

## 2020-02-27 ENCOUNTER — MEDICAL CORRESPONDENCE (OUTPATIENT)
Dept: HEALTH INFORMATION MANAGEMENT | Facility: CLINIC | Age: 35
End: 2020-02-27

## 2020-02-27 ENCOUNTER — TELEPHONE (OUTPATIENT)
Dept: FAMILY MEDICINE | Facility: CLINIC | Age: 35
End: 2020-02-27

## 2020-03-17 ENCOUNTER — TELEPHONE (OUTPATIENT)
Dept: FAMILY MEDICINE | Facility: CLINIC | Age: 35
End: 2020-03-17

## 2020-04-24 DIAGNOSIS — L70.9 ACNE: ICD-10-CM

## 2020-04-27 RX ORDER — METHOCARBAMOL 750 MG/1
TABLET ORAL
Qty: 42.5 G | Refills: 5 | Status: SHIPPED | OUTPATIENT
Start: 2020-04-27 | End: 2020-08-19

## 2020-08-17 ENCOUNTER — TELEPHONE (OUTPATIENT)
Dept: FAMILY MEDICINE | Facility: CLINIC | Age: 35
End: 2020-08-17

## 2020-08-17 NOTE — TELEPHONE ENCOUNTER
Reason for Call:  [place] [employer] is requesting letter with his listing of Diagnosis- Please Review the attached psych med monitoring letter for Pt.   Letter placed in [employer] Mercedes Donovan NP Desk.    Call taken on 8/17/2020 at 2:59 PM by Arelis Rose

## 2020-08-18 DIAGNOSIS — L70.9 ACNE: ICD-10-CM

## 2020-08-19 RX ORDER — METHOCARBAMOL 750 MG/1
TABLET ORAL
Qty: 42.5 G | Refills: 11 | Status: SHIPPED | OUTPATIENT
Start: 2020-08-19 | End: 2022-05-10

## 2020-08-19 NOTE — TELEPHONE ENCOUNTER
I have no current prescriptions that I am prescriptions for patient.    Form faxed back     Mercedes Donovan CNP

## 2020-09-14 ENCOUNTER — IMMUNIZATION (OUTPATIENT)
Dept: FAMILY MEDICINE | Facility: CLINIC | Age: 35
End: 2020-09-14
Payer: MEDICARE

## 2020-09-14 DIAGNOSIS — Z23 NEED FOR PROPHYLACTIC VACCINATION AND INOCULATION AGAINST INFLUENZA: Primary | ICD-10-CM

## 2020-09-14 PROCEDURE — 99207 ZZC NO CHARGE NURSE ONLY: CPT

## 2020-09-14 PROCEDURE — 90686 IIV4 VACC NO PRSV 0.5 ML IM: CPT

## 2020-09-14 PROCEDURE — G0008 ADMIN INFLUENZA VIRUS VAC: HCPCS

## 2021-02-25 ENCOUNTER — OFFICE VISIT (OUTPATIENT)
Dept: FAMILY MEDICINE | Facility: CLINIC | Age: 36
End: 2021-02-25
Payer: MEDICARE

## 2021-02-25 VITALS
DIASTOLIC BLOOD PRESSURE: 80 MMHG | HEIGHT: 66 IN | RESPIRATION RATE: 18 BRPM | HEART RATE: 92 BPM | TEMPERATURE: 98.3 F | WEIGHT: 173 LBS | SYSTOLIC BLOOD PRESSURE: 136 MMHG | BODY MASS INDEX: 27.8 KG/M2

## 2021-02-25 DIAGNOSIS — Z00.00 ENCOUNTER FOR MEDICARE ANNUAL WELLNESS EXAM: ICD-10-CM

## 2021-02-25 DIAGNOSIS — Z00.00 ANNUAL PHYSICAL EXAM: Primary | ICD-10-CM

## 2021-02-25 LAB
ANION GAP SERPL CALCULATED.3IONS-SCNC: 4 MMOL/L (ref 3–14)
BUN SERPL-MCNC: 9 MG/DL (ref 7–30)
CALCIUM SERPL-MCNC: 8.8 MG/DL (ref 8.5–10.1)
CHLORIDE SERPL-SCNC: 106 MMOL/L (ref 94–109)
CO2 SERPL-SCNC: 29 MMOL/L (ref 20–32)
CREAT SERPL-MCNC: 0.74 MG/DL (ref 0.66–1.25)
ERYTHROCYTE [DISTWIDTH] IN BLOOD BY AUTOMATED COUNT: 13.8 % (ref 10–15)
GFR SERPL CREATININE-BSD FRML MDRD: >90 ML/MIN/{1.73_M2}
GLUCOSE SERPL-MCNC: 108 MG/DL (ref 70–99)
HCT VFR BLD AUTO: 41.5 % (ref 40–53)
HGB BLD-MCNC: 13.5 G/DL (ref 13.3–17.7)
MCH RBC QN AUTO: 28.6 PG (ref 26.5–33)
MCHC RBC AUTO-ENTMCNC: 32.5 G/DL (ref 31.5–36.5)
MCV RBC AUTO: 88 FL (ref 78–100)
PLATELET # BLD AUTO: 343 10E9/L (ref 150–450)
POTASSIUM SERPL-SCNC: 3.9 MMOL/L (ref 3.4–5.3)
RBC # BLD AUTO: 4.72 10E12/L (ref 4.4–5.9)
SODIUM SERPL-SCNC: 139 MMOL/L (ref 133–144)
WBC # BLD AUTO: 7.2 10E9/L (ref 4–11)

## 2021-02-25 PROCEDURE — 99395 PREV VISIT EST AGE 18-39: CPT | Performed by: NURSE PRACTITIONER

## 2021-02-25 PROCEDURE — 36415 COLL VENOUS BLD VENIPUNCTURE: CPT | Performed by: NURSE PRACTITIONER

## 2021-02-25 PROCEDURE — 85027 COMPLETE CBC AUTOMATED: CPT | Performed by: NURSE PRACTITIONER

## 2021-02-25 PROCEDURE — 80048 BASIC METABOLIC PNL TOTAL CA: CPT | Performed by: NURSE PRACTITIONER

## 2021-02-25 ASSESSMENT — ENCOUNTER SYMPTOMS
EYE PAIN: 0
FEVER: 0
WEAKNESS: 0
JOINT SWELLING: 0
HEMATOCHEZIA: 0
DIARRHEA: 0
DYSURIA: 0
COUGH: 0
SORE THROAT: 0
HEMATURIA: 0
NAUSEA: 0
PARESTHESIAS: 0
FREQUENCY: 0
PALPITATIONS: 0
ABDOMINAL PAIN: 0
SHORTNESS OF BREATH: 0
CONSTIPATION: 0
NERVOUS/ANXIOUS: 0
HEARTBURN: 0
MYALGIAS: 0
CHILLS: 0
DIZZINESS: 0
ARTHRALGIAS: 0
HEADACHES: 0

## 2021-02-25 ASSESSMENT — ACTIVITIES OF DAILY LIVING (ADL)
CURRENT_FUNCTION: MONEY MANAGEMENT REQUIRES ASSISTANCE
CURRENT_FUNCTION: MEDICATION ADMINISTRATION REQUIRES ASSISTANCE
CURRENT_FUNCTION: TRANSPORTATION REQUIRES ASSISTANCE

## 2021-02-25 ASSESSMENT — MIFFLIN-ST. JEOR: SCORE: 1667.47

## 2021-02-25 NOTE — PROGRESS NOTES
"SUBJECTIVE:   Isaiah Dominguez is a 35 year old male who presents for Preventive Visit.      Patient has been advised of split billing requirements and indicates understanding: Yes   Are you in the first 12 months of your Medicare coverage?  No    Healthy Habits:     In general, how would you rate your overall health?  Good    Frequency of exercise:  2-3 days/week    Duration of exercise:  Less than 15 minutes    Do you usually eat at least 4 servings of fruit and vegetables a day, include whole grains    & fiber and avoid regularly eating high fat or \"junk\" foods?  No    Taking medications regularly:  Yes    Medication side effects:  None    Ability to successfully perform activities of daily living:  Transportation requires assistance, medication administration requires assistance and money management requires assistance    Home Safety:  No safety concerns identified    Hearing Impairment:  No hearing concerns    In the past 6 months, have you been bothered by leaking of urine?  No    In general, how would you rate your overall mental or emotional health?  Good      PHQ-2 Total Score: 0    Additional concerns today:  No    Do you feel safe in your environment? Yes    Have you ever done Advance Care Planning? (For example, a Health Directive, POLST, or a discussion with a medical provider or your loved ones about your wishes): No, advance care planning information given to patient to review.  Patient declined advance care planning discussion at this time.       Fall risk  Fallen 2 or more times in the past year?: No  Any fall with injury in the past year?: No    Cognitive Screening Not appropriate due to mental handicap    Do you have sleep apnea, excessive snoring or daytime drowsiness?: no    Reviewed and updated as needed this visit by clinical staff                 Reviewed and updated as needed this visit by Provider                Social History     Tobacco Use     Smoking status: Passive Smoke Exposure - " Never Smoker     Smokeless tobacco: Never Used     Tobacco comment: stepfather in the house   Substance Use Topics     Alcohol use: No     If you drink alcohol do you typically have >3 drinks per day or >7 drinks per week? No    Alcohol Use 2/25/2021   Prescreen: >3 drinks/day or >7 drinks/week? No   Prescreen: >3 drinks/day or >7 drinks/week? -         Current providers sharing in care for this patient include:   Patient Care Team:  No Ref-Primary, Physician as PCP - Mercedes Bell APRN CNP as Assigned PCP    The following health maintenance items are reviewed in Epic and correct as of today:  Health Maintenance   Topic Date Due     MEDICARE ANNUAL WELLNESS VISIT  01/21/2021     ADVANCE CARE PLANNING  01/22/2025     DTAP/TDAP/TD IMMUNIZATION (8 - Td) 06/04/2029     HEPATITIS C SCREENING  Completed     HIV SCREENING  Completed     PHQ-2  Completed     INFLUENZA VACCINE  Completed     IPV IMMUNIZATION  Completed     HEPATITIS B IMMUNIZATION  Completed     Pneumococcal Vaccine: Pediatrics (0 to 5 Years) and At-Risk Patients (6 to 64 Years)  Aged Out     MENINGITIS IMMUNIZATION  Aged Out     Labs reviewed in EPIC  BP Readings from Last 3 Encounters:   02/25/21 136/80   01/21/20 128/76   04/03/19 132/80    Wt Readings from Last 3 Encounters:   02/25/21 78.5 kg (173 lb)   01/21/20 78.9 kg (174 lb)   04/03/19 73.9 kg (163 lb)                  Patient Active Problem List   Diagnosis     Attention deficit hyperactivity disorder (ADHD)     Pituitary dwarfism (H)     CARDIOVASCULAR SCREENING; LDL GOAL LESS THAN 160     Auditory hallucinations     MR (mental retardation), moderate     Past Surgical History:   Procedure Laterality Date     Z NONSPECIFIC PROCEDURE      S/P bilateral PE tubes       Social History     Tobacco Use     Smoking status: Passive Smoke Exposure - Never Smoker     Smokeless tobacco: Never Used     Tobacco comment: stepfather in the house   Substance Use Topics     Alcohol use: No     Family  History   Problem Relation Age of Onset     Unknown/Adopted No family hx of      Asthma No family hx of      C.A.D. No family hx of      Diabetes No family hx of      Hypertension No family hx of          Current Outpatient Medications   Medication Sig Dispense Refill     ACNE MEDICATION 5 5 % topical gel APPLY TOPICALLY AT BEDTIME 42.5 g 11     ACT ANTICAVITY FLUORIDE RINSE 0.05 % MT SOLN bid       diphenhydrAMINE (BENADRYL) 25 MG tablet Take 1 tablet (25 mg) by mouth every 8 hours as needed for itching or allergies 60 tablet 1     Efinaconazole 10 % SOLN Externally apply topically daily Apply to toenail once daily for 48 weeks 8 mL 3     LAMICTAL 100 MG OR TABS 1 TABLET TWICE DAILY 60 0     mupirocin (BACTROBAN) 2 % cream Apply topically 3 times daily 15 g 1     OMEGA 3 1000 MG OR CAPS 2 CAPSULES ORALLY TWICE DAILY       SEROQUEL 200 MG OR TABS Take one tablet by mouth every evening.       STRATTERA 25 MG OR CAPS 1 tab 8 am 30 0     Allergies   Allergen Reactions     No Known Allergies      Recent Labs   Lab Test 06/22/19  0940 02/23/17  1559 04/04/15  0944 09/29/14  1644   *  --   --   --    HDL 42  --   --   --    TRIG 82  --   --   --    ALT  --   --   --  40   CR  --  0.77 0.85 0.78   GFRESTIMATED  --  >90  Non  GFR Calc   >90  Non  GFR Calc   >90  Non  GFR Calc     GFRESTBLACK  --  >90   GFR Calc   >90   GFR Calc   >90   GFR Calc     POTASSIUM  --  4.6 4.2 4.2       Review of Systems   Constitutional: Negative for chills and fever.   HENT: Negative for congestion, ear pain, hearing loss and sore throat.    Eyes: Negative for pain and visual disturbance.   Respiratory: Negative for cough and shortness of breath.    Cardiovascular: Negative for chest pain, palpitations and peripheral edema.   Gastrointestinal: Negative for abdominal pain, constipation, diarrhea, heartburn, hematochezia and nausea.  "  Genitourinary: Negative for discharge, dysuria, frequency, genital sores, hematuria, impotence and urgency.   Musculoskeletal: Negative for arthralgias, joint swelling and myalgias.   Skin: Negative for rash.   Neurological: Negative for dizziness, weakness, headaches and paresthesias.   Psychiatric/Behavioral: Negative for mood changes. The patient is not nervous/anxious.          OBJECTIVE:   /80 (BP Location: Right arm, Cuff Size: Adult Large)   Pulse 92   Temp 98.3  F (36.8  C) (Tympanic)   Resp 18   Ht 1.676 m (5' 6\")   Wt 78.5 kg (173 lb)   BMI 27.92 kg/m   Estimated body mass index is 28.08 kg/m  as calculated from the following:    Height as of 1/21/20: 1.676 m (5' 6\").    Weight as of 1/21/20: 78.9 kg (174 lb).  Physical Exam  GENERAL: healthy, alert and no distress  EYES: Eyes grossly normal to inspection, PERRL and conjunctivae and sclerae normal  HENT: ear canals and TM's normal, nose and mouth without ulcers or lesions  NECK: no adenopathy, no asymmetry, masses, or scars and thyroid normal to palpation  RESP: lungs clear to auscultation - no rales, rhonchi or wheezes  CV: regular rate and rhythm, normal S1 S2, no S3 or S4, no murmur, click or rub, no peripheral edema and peripheral pulses strong  ABDOMEN: soft, nontender, no hepatosplenomegaly, no masses and bowel sounds normal  MS: no gross musculoskeletal defects noted, no edema  SKIN: no suspicious lesions or rashes  NEURO: Normal strength and tone, mentation intact and speech normal  PSYCH: mentation appears normal, affect normal/bright    Results for orders placed or performed in visit on 02/25/21   Basic metabolic panel     Status: Abnormal   Result Value Ref Range    Sodium 139 133 - 144 mmol/L    Potassium 3.9 3.4 - 5.3 mmol/L    Chloride 106 94 - 109 mmol/L    Carbon Dioxide 29 20 - 32 mmol/L    Anion Gap 4 3 - 14 mmol/L    Glucose 108 (H) 70 - 99 mg/dL    Urea Nitrogen 9 7 - 30 mg/dL    Creatinine 0.74 0.66 - 1.25 mg/dL    GFR " "Estimate >90 >60 mL/min/[1.73_m2]    GFR Estimate If Black >90 >60 mL/min/[1.73_m2]    Calcium 8.8 8.5 - 10.1 mg/dL   CBC with platelets     Status: None   Result Value Ref Range    WBC 7.2 4.0 - 11.0 10e9/L    RBC Count 4.72 4.4 - 5.9 10e12/L    Hemoglobin 13.5 13.3 - 17.7 g/dL    Hematocrit 41.5 40.0 - 53.0 %    MCV 88 78 - 100 fl    MCH 28.6 26.5 - 33.0 pg    MCHC 32.5 31.5 - 36.5 g/dL    RDW 13.8 10.0 - 15.0 %    Platelet Count 343 150 - 450 10e9/L       ASSESSMENT / PLAN:   (Z00.00) Annual physical exam  (primary encounter diagnosis)  Comment:   Plan: Basic metabolic panel, CBC with platelets      (Z00.00) Encounter for Medicare annual wellness exam  Comment:   Plan:     Patient has been advised of split billing requirements and indicates understanding: Yes  COUNSELING:  Reviewed preventive health counseling, as reflected in patient instructions       Consider AAA screening for ages 65-75 and smoking history       Regular exercise       Healthy diet/nutrition       Vision screening       Hearing screening       Dental care       Bladder control       Fall risk prevention    Estimated body mass index is 28.08 kg/m  as calculated from the following:    Height as of 1/21/20: 1.676 m (5' 6\").    Weight as of 1/21/20: 78.9 kg (174 lb).        He reports that he is a non-smoker but has been exposed to tobacco smoke. He has never used smokeless tobacco.      Appropriate preventive services were discussed with this patient, including applicable screening as appropriate for cardiovascular disease, diabetes, osteopenia/osteoporosis, and glaucoma.  As appropriate for age/gender, discussed screening for colorectal cancer, prostate cancer, breast cancer, and cervical cancer. Checklist reviewing preventive services available has been given to the patient.    Reviewed patients plan of care and provided an AVS. The Basic Care Plan (routine screening as documented in Health Maintenance) for Isaiah Ferguson meets the Care Plan " requirement. This Care Plan has been established and reviewed with the Patient.    Counseling Resources:  ATP IV Guidelines  Pooled Cohorts Equation Calculator  Breast Cancer Risk Calculator  Breast Cancer: Medication to Reduce Risk  FRAX Risk Assessment  ICSI Preventive Guidelines  Dietary Guidelines for Americans, 2010  USDA's MyPlate  ASA Prophylaxis  Lung CA Screening    MARYCRUZ Calhoun CNP  [facility]    Identified Health Risks:    The patient was counseled and encouraged to consider modifying their diet and eating habits. He was provided with information on recommended healthy diet options.  The patient reports that he has difficulty with activities of daily living. I have asked that the patient make a follow up appointment in 52 weeks where this issue will be further evaluated and addressed.

## 2021-02-26 NOTE — RESULT ENCOUNTER NOTE
Please Notify Isaiah Ferguson  of test results basic metabolic panel was within normal limits creatinine was within normal limits indicating good kidney function.  CBC was within normal limits with normal hemoglobin levels    Mercedes Donovan CNP

## 2021-03-01 NOTE — PATIENT INSTRUCTIONS
Patient Education   Personalized Prevention Plan  You are due for the preventive services outlined below.  Your care team is available to assist you in scheduling these services.  If you have already completed any of these items, please share that information with your care team to update in your medical record.  There are no preventive care reminders to display for this patient.    Understanding USDA MyPlate  The USDA has guidelines to help you make healthy food choices. These are called MyPlate. MyPlate shows the food groups that make up healthy meals using the image of a place setting. Before you eat, think about the healthiest choices for what to put on your plate or in your cup or bowl. To learn more about building a healthy plate, visit www.choosemyplate.gov.     The food groups    Fruits. Any fruit or 100% fruit juice counts as part of the Fruit Group. Fruits may be fresh, canned, frozen, or dried, and may be whole, cut-up, or pureed. Make 1/2 of your plate fruits and vegetables.    Vegetables. Any vegetable or 100% vegetable juice counts as a member of the Vegetable Group. Vegetables may be fresh, frozen, canned, or dried. They can be served raw or cooked and may be whole, cut-up, or mashed. Make 1/2 of your plate fruits and vegetables.    Grains. All foods made from grains are part of the Grains Group. These include wheat, rice, oats, cornmeal, and barley. Grains are often used to make foods such as bread, pasta, oatmeal, cereal, tortillas, and grits. Grains should be no more than 1/4 of your plate. At least half of your grains should be whole grains.    Protein. This group includes meat, poultry, seafood, beans and peas, eggs, processed soy products (such as tofu), nuts (including nut butters), and seeds. Make protein choices no more than 1/4 of your plate. Meat and poultry choices should be lean or low fat.    Dairy. The Dairy Group includes all fluid milk products and foods made from milk that contain  calcium, such as yogurt and cheese. (Foods that have little calcium, such as cream, butter, and cream cheese, are not part of this group.) Most dairy choices should be low-fat or fat-free.    Oils. Oils aren't a food group, but they do contain essential nutrients. However it's important to watch your intake of oils. These are fats that are liquid at room temperature. They include canola, corn, olive, soybean, vegetable, and sunflower oil. Foods that are mainly oil include mayonnaise, certain salad dressings, and soft margarines. You likely already get your daily oil allowance from the foods you eat.  Things to limit  Eating healthy also means limiting these things in your diet:    Salt (sodium). Many processed foods have a lot of sodium. To keep sodium intake down, eat fresh vegetables, meats, poultry, and seafood when possible. Purchase low-sodium, reduced-sodium, or no-salt-added food products at the store. And don't add salt to your meals at home. Instead, season them with herbs and spices such as dill, oregano, cumin, and paprika. Or try adding flavor with lemon or lime zest and juice.    Saturated fat. Saturated fats are most often found in animal products such as beef, pork, and chicken. They are often solid at room temperature, such as butter. To reduce your saturated fat intake, choose leaner cuts of meat and poultry. And try healthier cooking methods such as grilling, broiling, roasting, or baking. For a simple lower-fat swap, use plain nonfat yogurt instead of mayonnaise when making potato salad or macaroni salad.    Added sugars. These are sugars added to foods. They are in foods such as ice cream, candy, soda, fruit drinks, sports drinks, energy drinks, cookies, pastries, jams, and syrups. Cut down on added sugars by sharing sweet treats with a family member or friend. You can also choose fruit for dessert, and drink water or other unsweetened beverages.  [employer] last reviewed this educational content  on 6/1/2020 2000-2020 [employer]. [address] [place] [address], [place] [address]. All rights reserved. This information is not intended as a substitute for professional medical care. Always follow your healthcare professional's instructions.        Activities of Daily Living    Your Health Risk Assessment indicates you have difficulties with activities of daily living such as housework, bathing, preparing meals, taking medication, etc. Please make a follow up appointment for us to address this issue in more detail.

## 2021-05-10 ENCOUNTER — OFFICE VISIT (OUTPATIENT)
Dept: FAMILY MEDICINE | Facility: CLINIC | Age: 36
End: 2021-05-10
Payer: MEDICARE

## 2021-05-10 VITALS
BODY MASS INDEX: 27.32 KG/M2 | SYSTOLIC BLOOD PRESSURE: 160 MMHG | HEART RATE: 88 BPM | TEMPERATURE: 97.3 F | DIASTOLIC BLOOD PRESSURE: 100 MMHG | WEIGHT: 170 LBS | HEIGHT: 66 IN

## 2021-05-10 DIAGNOSIS — I10 BENIGN ESSENTIAL HYPERTENSION: Primary | ICD-10-CM

## 2021-05-10 PROCEDURE — 99213 OFFICE O/P EST LOW 20 MIN: CPT | Performed by: NURSE PRACTITIONER

## 2021-05-10 RX ORDER — LISINOPRIL 20 MG/1
20 TABLET ORAL DAILY
Qty: 90 TABLET | Refills: 3 | Status: SHIPPED | OUTPATIENT
Start: 2021-05-10 | End: 2022-04-28

## 2021-05-10 ASSESSMENT — MIFFLIN-ST. JEOR: SCORE: 1653.86

## 2021-05-10 NOTE — PATIENT INSTRUCTIONS
It was discovered today your blood pressure was elevated. Elevated blood pressure is defined as blood pressure greater then 140/80.   Continue to monitor your blood pressure and return for a nurse only blood pressure recheck. If remains over 140/80 call me and we will make medications adjustments         Patient Education     Discharge Instructions for High Blood Pressure (Hypertension)  You have been diagnosed with high blood pressure (hypertension). This means the force of blood against your artery walls is too strong. It also means your heart is working hard to move blood. High blood pressure usually has no symptoms, but over time, it can cause serious health problems. High blood pressure raises your risk for heart attack, stroke, heart disease, heart failure, kidney disease, and vision loss. With help from your doctor, you can manage your blood pressure and protect your health.  Blood pressure measurements are given as 2 numbers. Systolic blood pressure is the upper number. This is the pressure when the heart contracts or pumps. Diastolic blood pressure is the lower number. This is the pressure when the heart relaxes between beats.  Blood pressure is categorized as normal, elevated, or stage 1 or stage 2 high blood pressure:    Normal blood pressure is systolic of less than 120 and diastolic of less than 80 (120/80) at rest    Elevated blood pressure is systolic of 120 to 129 and diastolic less than 80 at rest    Stage 1 high blood pressure is systolic is 130 to 139 or diastolic between 80 to 89 at rest    Stage 2 high blood pressure is when systolic is 140 or higher or the diastolic is 90 or higher at rest  Taking medicine    Learn to measure your own blood pressure. Keep a record of your results. Ask your doctor which readings mean that you need medical attention.    Take your blood pressure medicine exactly as directed. Don t skip doses. Missing doses can cause your blood pressure to get out of control.    If  you do miss a dose (or doses) check with your healthcare provider about what to do.    Don't take medicines that contain heart stimulants, including over-the-counter medicines. Check for warnings about high blood pressure on the label. Ask the pharmacist before purchasing something you haven't used before    Check with your doctor or pharmacist before taking a decongestant such as pseudoephedrine or phenylephrine. Some decongestants can worsen high blood pressure.    Lifestyle changes    Stay at a healthy weight. Get help to lose any extra pounds (kilograms). Often times meeting with a dietitian can help you identify changes that can be made to your diet to help with weight loss.    Cut back on salt.  ? Limit canned, dried, packaged, and fast foods.  ? Don t add salt to your food at the table.  ? Season foods with herbs instead of salt when you cook.  ? Request no added salt when you go to a restaurant.  ? The American Heart Association (AHA) says the ideal amount of sodium is no more than 1,500 mg a day.  But because Americans eat so much salt, you can make a positive change by cutting back to even 2,300 mg of sodium a day (1 teaspoon).     Follow the DASH (Dietary Approaches to Stop Hypertension) eating plan. This plan recommends vegetables, fruits, whole gains, and other heart healthy foods.    Eat food rich in potassium.    Begin an exercise program. Ask your healthcare provider how to get started. The AHA recommends aerobic exercise 3 to 4 times a week for an average of 40 minutes at a time to lower blood pressure, with your provider's approval. Simple activities such as walking or gardening can help.    Break the smoking habit. Enroll in a stop-smoking program to improve your chances of success. Ask your healthcare provider about programs and medicines to help you stop smoking.    Limit drinks that contain caffeine such as coffee, black or green tea, and cola to 2 per day.    Never take stimulants such as  amphetamines or cocaine. These drugs can be deadly for someone with high blood pressure.    Control your stress. Learn ways to manage stress.    Limit alcohol to no more than 1 drink a day for women and 2 drinks a day for men.    Follow-up care  Make a follow-up appointment as directed.  When to call your healthcare provider  Call your healthcare provider right away if you have any of the following:    Chest pain or shortness of breath ( call 911)    Moderate to severe headache    Weakness in the muscles of your face, arms, or legs    Trouble speaking    Extreme drowsiness    Confusion    Fainting or dizziness    Pulsating or rushing sound in your ears    Unexplained nosebleed    Weakness, tingling, or numbness of your face, arms, or legs    Change in vision    Blood pressure measured at home that is greater than 180/110  [employer] last reviewed this educational content on 8/1/2019 2000-2021 [employer]. All rights reserved. This information is not intended as a substitute for professional medical care. Always follow your healthcare professional's instructions.

## 2021-05-10 NOTE — PROGRESS NOTES
"    Assessment & Plan     (I10) Benign essential hypertension  (primary encounter diagnosis)  Comment: Patient has new onset hypertension will start on lisinopril 20 mg patient to monitor blood pressure if remains above 140/80 will contact me and we will consider increasing lisinopril or adding Norvasc  Plan: lisinopril (ZESTRIL) 20 MG tablet  No follow-ups on file.    Mercedes Donovan, MARYCRUZ CNP  [facility]    Rachel Colon Jr is a 35 year old who presents for the following health issues     HPI     Hypertension Follow-up      Do you check your blood pressure regularly outside of the clinic? Yes     Are you following a low salt diet? No    Are your blood pressures ever more than 140 on the top number (systolic) OR more   than 90 on the bottom number (diastolic), for example 140/90? No        Review of Systems   Constitutional, HEENT, cardiovascular, pulmonary, gi and gu systems are negative, except as otherwise noted.      Objective    BP (!) 160/100 (BP Location: Right arm, Cuff Size: Adult Large)   Pulse 88   Temp 97.3  F (36.3  C) (Tympanic)   Ht 1.676 m (5' 6\")   Wt 77.1 kg (170 lb)   BMI 27.44 kg/m    There is no height or weight on file to calculate BMI.  Physical Exam   GENERAL: healthy, alert and no distress  EYES: Eyes grossly normal to inspection, PERRL and conjunctivae and sclerae normal  HENT: ear canals and TM's normal, nose and mouth without ulcers or lesions  NECK: no adenopathy, no asymmetry, masses, or scars and thyroid normal to palpation  RESP: lungs clear to auscultation - no rales, rhonchi or wheezes  CV: regular rate and rhythm, normal S1 S2, no S3 or S4, no murmur, click or rub, no peripheral edema and peripheral pulses strong  MS: no gross musculoskeletal defects noted, no edema  SKIN: no suspicious lesions or rashes  NEURO: Normal strength and tone, mentation intact and speech normal  PSYCH: mentation appears normal, affect normal/bright    Labs current " normal creatinine level

## 2021-10-28 ENCOUNTER — IMMUNIZATION (OUTPATIENT)
Dept: FAMILY MEDICINE | Facility: CLINIC | Age: 36
End: 2021-10-28
Payer: COMMERCIAL

## 2021-10-28 DIAGNOSIS — Z23 NEED FOR PROPHYLACTIC VACCINATION AND INOCULATION AGAINST INFLUENZA: Primary | ICD-10-CM

## 2021-10-28 PROCEDURE — 99207 PR NO CHARGE NURSE ONLY: CPT

## 2021-10-28 PROCEDURE — 90471 IMMUNIZATION ADMIN: CPT

## 2021-10-28 PROCEDURE — 90686 IIV4 VACC NO PRSV 0.5 ML IM: CPT

## 2021-11-08 ENCOUNTER — TELEPHONE (OUTPATIENT)
Dept: FAMILY MEDICINE | Facility: CLINIC | Age: 36
End: 2021-11-08
Payer: MEDICARE

## 2021-11-09 ENCOUNTER — MEDICAL CORRESPONDENCE (OUTPATIENT)
Dept: HEALTH INFORMATION MANAGEMENT | Facility: CLINIC | Age: 36
End: 2021-11-09
Payer: MEDICARE

## 2021-12-08 ENCOUNTER — VIRTUAL VISIT (OUTPATIENT)
Dept: FAMILY MEDICINE | Facility: CLINIC | Age: 36
End: 2021-12-08
Payer: COMMERCIAL

## 2021-12-08 DIAGNOSIS — B34.9 VIRAL ILLNESS: Primary | ICD-10-CM

## 2021-12-08 PROCEDURE — 99213 OFFICE O/P EST LOW 20 MIN: CPT | Mod: 95 | Performed by: PREVENTIVE MEDICINE

## 2021-12-08 NOTE — PROGRESS NOTES
"Isaiah Ferguson is a 36 year old who is being evaluated via a billable telephone visit.      What phone number would you like to be contacted at? Home number   How would you like to obtain your AVS? Mail a copy    Assessment & Plan     Viral illness  -symptoms were last week, feeling better now  -lives in a group home and other residents have also been sick  -hydration and monitor temperature  -ER precautions: chest pain, sudden shortness of breath   - Symptomatic COVID-19 Virus (Coronavirus) by PCR Nose      Ordering of each unique test  15 minutes spent on the date of the encounter doing chart review, history and exam, documentation and further activities per the note       BMI:   Estimated body mass index is 27.44 kg/m  as calculated from the following:    Height as of 5/10/21: 1.676 m (5' 6\").    Weight as of 5/10/21: 77.1 kg (170 lb).       Return in about 1 day (around 12/9/2021) for labs.    Madeline Tomas MD MPH    [facility]    Subjective   Isaiah Ferguson is a 36 year old who presents for the following health issues:    HPI     Request for Covid testing:  -was sick last week  -Patient was vomiting, no hematemesis   -no sore throat  -no fever  -no diarrhea  -feeling better  -no nasal congestion  -no shortness of breath  -per patient is scheduled for booster in a few days   -sick contacts at home+  -Lives in a group home and there was concern since several household members are sick       COVID-19,PF,Moderna 3/12/2021         Review of Systems   Constitutional, HEENT, cardiovascular, pulmonary, gi and gu systems are negative, except as otherwise noted.      Objective           Vitals:  No vitals were obtained today due to virtual visit.    Physical Exam   healthy, alert and no distress  PSYCH: Alert and oriented times 3; coherent speech  RESP: No cough, no audible wheezing, able to talk in full sentences  Remainder of exam unable to be completed due to telephone visits          Phone call " duration: 5 minutes

## 2021-12-15 ENCOUNTER — IMMUNIZATION (OUTPATIENT)
Dept: FAMILY MEDICINE | Facility: CLINIC | Age: 36
End: 2021-12-15
Payer: COMMERCIAL

## 2021-12-15 PROCEDURE — 0064A COVID-19,PF,MODERNA (18+ YRS BOOSTER .25ML): CPT

## 2021-12-15 PROCEDURE — 91306 COVID-19,PF,MODERNA (18+ YRS BOOSTER .25ML): CPT

## 2021-12-19 ENCOUNTER — APPOINTMENT (OUTPATIENT)
Dept: GENERAL RADIOLOGY | Facility: CLINIC | Age: 36
End: 2021-12-19
Attending: PHYSICIAN ASSISTANT
Payer: MEDICARE

## 2021-12-19 ENCOUNTER — HOSPITAL ENCOUNTER (OUTPATIENT)
Facility: CLINIC | Age: 36
Discharge: HOME | End: 2021-12-20
Attending: PHYSICIAN ASSISTANT | Admitting: STUDENT IN AN ORGANIZED HEALTH CARE EDUCATION/TRAINING PROGRAM
Payer: MEDICARE

## 2021-12-19 DIAGNOSIS — S82.301A CLOSED FRACTURE OF DISTAL END OF RIGHT TIBIA, UNSPECIFIED FRACTURE MORPHOLOGY, INITIAL ENCOUNTER: ICD-10-CM

## 2021-12-19 DIAGNOSIS — S82.831A CLOSED FRACTURE OF DISTAL END OF RIGHT FIBULA, UNSPECIFIED FRACTURE MORPHOLOGY, INITIAL ENCOUNTER: ICD-10-CM

## 2021-12-19 DIAGNOSIS — S82.831A CLOSED FRACTURE OF PROXIMAL END OF RIGHT FIBULA, UNSPECIFIED FRACTURE MORPHOLOGY, INITIAL ENCOUNTER: ICD-10-CM
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
## 2021-12-19 DIAGNOSIS — Z11.52 ENCOUNTER FOR SCREENING LABORATORY TESTING FOR SEVERE ACUTE RESPIRATORY SYNDROME CORONAVIRUS 2 (SARS-COV-2): ICD-10-CM

## 2021-12-19 LAB — SARS-COV-2 RNA RESP QL NAA+PROBE: NEGATIVE

## 2021-12-19 PROCEDURE — 99285 EMERGENCY DEPT VISIT HI MDM: CPT | Mod: 25 | Performed by: EMERGENCY MEDICINE

## 2021-12-19 PROCEDURE — C9803 HOPD COVID-19 SPEC COLLECT: HCPCS | Performed by: EMERGENCY MEDICINE

## 2021-12-19 PROCEDURE — 250N000013 HC RX MED GY IP 250 OP 250 PS 637: Performed by: PHYSICIAN ASSISTANT

## 2021-12-19 PROCEDURE — 73630 X-RAY EXAM OF FOOT: CPT | Mod: RT

## 2021-12-19 PROCEDURE — 93005 ELECTROCARDIOGRAM TRACING: CPT | Performed by: EMERGENCY MEDICINE

## 2021-12-19 PROCEDURE — 73502 X-RAY EXAM HIP UNI 2-3 VIEWS: CPT

## 2021-12-19 PROCEDURE — 999N000054 HC STATISTIC EKG NON-CHARGEABLE: Performed by: EMERGENCY MEDICINE

## 2021-12-19 PROCEDURE — 29505 APPLICATION LONG LEG SPLINT: CPT | Mod: RT | Performed by: EMERGENCY MEDICINE

## 2021-12-19 PROCEDURE — 73590 X-RAY EXAM OF LOWER LEG: CPT | Mod: RT

## 2021-12-19 PROCEDURE — 87635 SARS-COV-2 COVID-19 AMP PRB: CPT | Performed by: PHYSICIAN ASSISTANT

## 2021-12-19 PROCEDURE — 73610 X-RAY EXAM OF ANKLE: CPT | Mod: RT

## 2021-12-19 RX ORDER — IBUPROFEN 200 MG
600 TABLET ORAL ONCE
Status: COMPLETED | OUTPATIENT
Start: 2021-12-19 | End: 2021-12-19

## 2021-12-19 RX ORDER — QUETIAPINE FUMARATE 25 MG/1
25 TABLET, FILM COATED ORAL ONCE
Status: COMPLETED | OUTPATIENT
Start: 2021-12-19 | End: 2021-12-19

## 2021-12-19 RX ADMIN — QUETIAPINE 25 MG: 25 TABLET ORAL at 22:32

## 2021-12-19 RX ADMIN — IBUPROFEN 600 MG: 200 TABLET, FILM COATED ORAL at 17:48

## 2021-12-19 ASSESSMENT — ACTIVITIES OF DAILY LIVING (ADL)
ADLS_ACUITY_SCORE: 7

## 2021-12-19 ASSESSMENT — ENCOUNTER SYMPTOMS
FACIAL ASYMMETRY: 0
TREMORS: 0
MYALGIAS: 0
JOINT SWELLING: 1
DIZZINESS: 0
SEIZURES: 0
ARTHRALGIAS: 1
LIGHT-HEADEDNESS: 0
HEADACHES: 0
SPEECH DIFFICULTY: 0
NUMBNESS: 0
WEAKNESS: 0

## 2021-12-19 ASSESSMENT — VISUAL ACUITY: OU: 1

## 2021-12-19 NOTE — ED PROVIDER NOTES
History     Chief Complaint   Patient presents with     Fall     pt here with mom, cognitive challenges, pt fell in his bedroom at 1330, right lower leg pain, pt up walking after incident     HPI  Isaiah Dominguez is a 36 year old male with a past medical history of cognitive challenges, attention deficit hyperactivity disorder, hypertension, and pituitary dwarfism who presents with right ankle pain secondary to a fall which occurred this afternoon in his bedroom.  Currently lives at a group home, he is accompanied by his caregiver today.  The patient's mom is aware that the patient is being seen and evaluated in urgent care today.  The patient currently rates the pain as 8/10 and describes the pain as sharp. The pain is localized to the medial aspect of the right ankle with radiation to the distal tibia..  He was initially able to bear weight but cannot bear weight now, presented to urgent care in a wheelchair.  The patient has been taking any medications for symptomatic relief of pain.  He has been applying ice to the affected area since arrival to [facility]..  Has normal sensation and normal strength in the right ankle and right lower extremity. No previous surgeries or injuries to the right lower extremity.  The patient's caregiver states that there was an audible thud heard when the patient fell.  The patient was unable to respond clearly to questions for the first 2 minutes after the fall.  Started speaking more clearly after the first 2 minutes after being seated on his bed.  Denies any other trauma. No agitation, somnolence, increased pain, headaches, acute vision concerns, speech changes, loss of consciousness, or reduced activity level.    Allergies:  Allergies   Allergen Reactions     No Known Allergies        Problem List:    Patient Active Problem List    Diagnosis Date Noted     Fall, initial encounter 12/19/2021     Priority: Medium     Closed fracture of proximal end of right  fibula, unspecified fracture morphology, initial encounter 12/19/2021     Priority: Medium     Closed fracture of distal end of right tibia, unspecified fracture morphology, initial encounter 12/19/2021     Priority: Medium     Benign essential hypertension 05/10/2021     Priority: Medium     MR (mental retardation), moderate 09/29/2014     Priority: Medium     Auditory hallucinations 01/15/2013     Priority: Medium     CARDIOVASCULAR SCREENING; LDL GOAL LESS THAN 160 10/31/2010     Priority: Medium     Attention deficit hyperactivity disorder (ADHD) 04/12/2005     Priority: Medium     Problem list name updated by automated process. Provider to review       Pituitary dwarfism (H) 04/12/2005     Priority: Medium     Treated in teen years with [facility]          Past Medical History:    Past Medical History:   Diagnosis Date     Acne vulgaris      Allergies      Attention deficit disorder with hyperactivity(314.01)      Moderate intellectual disabilities      Photosensitive contact dermatitis      Pituitary dwarfism (H)        Past Surgical History:    Past Surgical History:   Procedure Laterality Date     ZZC NONSPECIFIC PROCEDURE      S/P bilateral PE tubes       Family History:    Family History   Problem Relation Age of Onset     Unknown/Adopted No family hx of      Asthma No family hx of      C.A.D. No family hx of      Diabetes No family hx of      Hypertension No family hx of        Social History:  Marital Status:  Single [1]  Social History     Tobacco Use     Smoking status: Passive Smoke Exposure - Never Smoker     Smokeless tobacco: Never Used     Tobacco comment: stepfather in the house   Substance Use Topics     Alcohol use: No     Drug use: No        Medications:    ACNE MEDICATION 5 5 % topical gel  ACT ANTICAVITY FLUORIDE RINSE 0.05 % MT SOLN  diphenhydrAMINE (BENADRYL) 25 MG tablet  Efinaconazole 10 % SOLN  LAMICTAL 100 MG OR TABS  lisinopril (ZESTRIL) 20 MG tablet  mupirocin (BACTROBAN) 2 % cream  OMEGA 3  1000 MG OR CAPS  SEROQUEL 200 MG OR TABS  STRATTERA 25 MG OR CAPS          Review of Systems   Musculoskeletal: Positive for arthralgias, gait problem and joint swelling. Negative for myalgias.   Neurological: Negative for dizziness, tremors, seizures, syncope, facial asymmetry, speech difficulty, weakness, light-headedness, numbness and headaches.       Physical Exam   BP: (!) 143/86  Pulse: 84  Temp: 98  F (36.7  C)  Resp: 10  Weight: 83.5 kg (184 lb)  SpO2: 99 %      Physical Exam  Constitutional:       General: He is not in acute distress.     Appearance: Normal appearance. He is not ill-appearing, toxic-appearing or diaphoretic.   HENT:      Head: Normocephalic and atraumatic. No right periorbital erythema or left periorbital erythema.      Jaw: There is normal jaw occlusion.      Right Ear: Tympanic membrane, ear canal and external ear normal. There is no impacted cerumen.      Left Ear: Tympanic membrane, ear canal and external ear normal. There is no impacted cerumen.      Nose: Nose normal. No congestion or rhinorrhea.      Mouth/Throat:      Mouth: Mucous membranes are moist.      Pharynx: Oropharynx is clear. No oropharyngeal exudate or posterior oropharyngeal erythema.   Eyes:      General: Vision grossly intact.         Right eye: No discharge.         Left eye: No discharge.      Extraocular Movements: Extraocular movements intact.      Conjunctiva/sclera: Conjunctivae normal.      Pupils: Pupils are equal, round, and reactive to light.      Right eye: Pupil is round, reactive and not sluggish.      Left eye: Pupil is round, reactive and not sluggish.      Funduscopic exam:     Right eye: Red reflex present.         Left eye: Red reflex present.  Cardiovascular:      Pulses: Normal pulses.           Dorsalis pedis pulses are 2+ on the right side.        Posterior tibial pulses are 2+ on the right side.   Musculoskeletal:         General: Swelling and tenderness present.      Cervical back: Normal,  normal range of motion and neck supple. No edema, erythema, signs of trauma, rigidity, torticollis, tenderness or crepitus. No pain with movement, spinous process tenderness or muscular tenderness. Normal range of motion.      Thoracic back: Normal.      Lumbar back: Normal.      Right lower leg: Swelling and deformity present. No tenderness. No edema.      Left lower leg: Normal.      Right ankle: Swelling present. No deformity. Decreased range of motion.      Left ankle: Normal.        Legs:       Comments: Deformity noted over the proximal aspect of the fibula, no tenderness at the fibular head.  Has tenderness inferior to the medial malleolus of the right ankle.  Also has tenderness at the distal aspect of the tibia.  No evidence of acute swelling. No unusual tenderness, crepitance or fluctuance; no rash, wounds or lesions, compartments soft.   Lymphadenopathy:      Cervical: No cervical adenopathy.   Skin:     General: Skin is warm and dry.      Findings: No laceration, lesion or rash.   Neurological:      General: No focal deficit present.      Mental Status: He is alert and oriented to person, place, and time.      Cranial Nerves: No cranial nerve deficit.      Sensory: No sensory deficit.      Motor: No weakness.      Coordination: Coordination normal.      Gait: Gait normal.      Deep Tendon Reflexes: Reflexes normal.   Psychiatric:         Mood and Affect: Mood normal.         Behavior: Behavior normal.         Thought Content: Thought content normal.         Judgment: Judgment normal.         [facility]    Splint Application    Date/Time: 12/19/2021 8:33 PM  Performed by: Layton Cordova PA-C  Authorized by: Layton Cordova PA-C     Risks, benefits and alternatives discussed.      PRE-PROCEDURE DETAILS     Sensation:  Normal    Skin color:  Normal    PROCEDURE DETAILS     Laterality:  Right    Location:  Leg    Leg:  R lower leg    Strapping:  no      Splint type:  Long leg    Supplies:  Ortho-Glass    POST PROCEDURE DETAILS     Pain:  Improved    Sensation:  Normal    Skin color:  Normal      PROCEDURE    Patient Tolerance:  Patient tolerated the procedure well with no immediate complications              Results for orders placed or performed during the hospital encounter of 12/19/21 (from the past 24 hour(s))   Ankle XR, G/E 3 views, right    Narrative    EXAM: XR ANKLE RIGHT G/E 3 VIEWS, XR TIBIA and FIBULA RT 2 VW  LOCATION: [facility]  DATE/TIME: 12/19/2021 3:56 PM    INDICATION: Right lower extremity pain.  COMPARISON: None.      Impression    IMPRESSION:  1.  Acute spiral fracture of the right tibia distal diaphysis with 13 mm of posterolateral displacement. Minimally displaced oblique fracture of the fibula proximal metaphysis.  2.  Normal joint spacing and alignment.  3.  Soft tissue swelling in the calf.   XR Tibia & Fibula Right 2 Views    Narrative    EXAM: XR ANKLE RIGHT G/E 3 VIEWS, XR TIBIA and FIBULA RT 2 VW  LOCATION: [facility]  DATE/TIME: 12/19/2021 3:56 PM    INDICATION: Right lower extremity pain.  COMPARISON: None.      Impression    IMPRESSION:  1.  Acute spiral fracture of the right tibia distal diaphysis with 13 mm of posterolateral displacement. Minimally displaced oblique fracture of the fibula proximal metaphysis.  2.  Normal joint spacing and alignment.  3.  Soft tissue swelling in the calf.   Asymptomatic COVID-19 Virus (Coronavirus) by PCR Nasopharyngeal    Specimen: Nasopharyngeal; Swab   Result Value Ref Range    SARS CoV2 PCR Negative Negative    Narrative    Testing was performed using the tono  SARS-CoV-2 & Influenza A/B Assay on the tono  Rita  System.  This test should be ordered for the detection of SARS-COV-2 in individuals who meet SARS-CoV-2 clinical and/or epidemiological criteria. Test performance is unknown in asymptomatic patients.  This test is for in  vitro diagnostic use under the FDA EUA for laboratories certified under CLIA to perform moderate and/or high complexity testing. This test has not been FDA cleared or approved.  A negative test does not rule out the presence of PCR inhibitors in the specimen or target RNA in concentration below the limit of detection for the assay. The possibility of a false negative should be considered if the patient's recent exposure or clinical presentation suggests COVID-19.  [facility] are certified under the Clinical Laboratory Improvement Amendments of 1988 (CLIA-88) as qualified to perform moderate and/or high complexity laboratory testing.   Pelvis XR w/ unilateral hip right    Narrative    EXAM: XR PELVIS AND HIP RIGHT 1 VIEW  LOCATION: [facility]  DATE/TIME: 12/19/2021 6:02 PM    INDICATION: Pelvic and right hip pain after a fall.  COMPARISON: None.      Impression    IMPRESSION: Normal joint spaces and alignment. No fracture.   Foot  XR, G/E 3 views, right    Narrative    EXAM: XR FOOT RIGHT G/E 3 VIEWS  LOCATION: [facility]  DATE/TIME: 12/19/2021 6:01 PM    INDICATION: Right foot pain after a fall.  COMPARISON: None.      Impression    IMPRESSION:  1.  Partially visualized spiral fracture of the right tibia distal diaphysis.  2.  No additional fractures are identified.  3.  Normal foot joint spacing and alignment.       Medications   QUEtiapine (SEROquel) tablet 25 mg (has no administration in time range)   ibuprofen (ADVIL/MOTRIN) tablet 600 mg (600 mg Oral Given 12/19/21 1748)       Assessments & Plan (with Medical Decision Making)   The patient is a 36 year old male with a past medical history of cognitive challenges, attention deficit hyperactivity disorder, hypertension, and pituitary dwarfism who presents with right ankle pain secondary to a fall which occurred this afternoon in his bedroom.  Currently has pain at the medial aspect of his  right ankle with radiation to the distal tibia.    No symptoms to suggest necrotizing fasciitis or deep space infection or compartment syndrome.    X-rays of the tibia and fibula showed a minimally displaced oblique fracture of the fibula proximal metaphysis.  There is an acute spiral fracture of the right tibia distal diaphysis with 13 mm of posterior lateral displacement.  Discussed with  of [facility] orthopedics who recommend admitting the patient, needs to undergo ORIF surgery tomorrow.      Due to no hospital beds, I am keeping the patient in the emergency department until tomorrow.  Started home meds tonight in the emergency department.  The patient takes no meds at 8 AM, 4 PM, and 8 PM.  Of note, he takes a single dose of 25 mg Seroquel at 4 PM in addition to his 8 PM dose of 200 mg.     Placed a posterior splint on the patient's right lower extremity per the recommendations of orthopedic surgery.  Elevating and icing the lower extremity.  Admit orders placed.    Discussed with Dr. Stanley Santiago of [facility] who will be assuming the patient's care.      I have reviewed the nursing notes.    I have reviewed the findings, diagnosis, plan and need for follow up with the patient.  --    ED Attending Physician Attestation    I Stanley Santiago MD, MD, cared for this patient with the Advanced Practice Provider (SHABANA). I have performed a history and physical examination of the patient independent of the SHABANA. I reviewed the SHABANA's documentation above and agree with the documented findings and plan of care.     Summary of HPI, PE, ED Course   Patient is a 36 year old male evaluated in the emergency department for right leg pain after fall. Exam notable for sensation intact in the toes, short leg posterior splint right lower extremity. ED course notable for splinting and ice elevation. After the completion of care in the emergency department, the patient was admitted to  observation.    Critical Care & Procedures  Not applicable.    Medical Decision Making  The medical record was reviewed and interpreted.  This is a shared visit  Interviewed the patient, examined him, reviewed results of x-rays, assisted Layton with orders for boarding in ED.  N.p.o. post midnight anticipating orthopedic surgery tomorrow morning.  Agree with contents of note assessment and plan.      Stanley Santiago MD, MD  Emergency Medicine         New Prescriptions    No medications on file       Final diagnoses:   Closed fracture of distal end of right tibia, unspecified fracture morphology, initial encounter   Closed fracture of proximal end of right fibula, unspecified fracture morphology, initial encounter   Fall, initial encounter       12/19/2021   [facility] EMERGENCY DEPT     Layton Cordova PA-C  12/19/21 2034       Layton Cordova PA-C  12/19/21 2038       Stanley Santiago MD  12/19/21 2462

## 2021-12-19 NOTE — H&P (VIEW-ONLY)
History     Chief Complaint   Patient presents with     Fall     pt here with mom, cognitive challenges, pt fell in his bedroom at 1330, right lower leg pain, pt up walking after incident     HPI  Isaiah Dominguez is a 36 year old male with a past medical history of cognitive challenges, attention deficit hyperactivity disorder, hypertension, and pituitary dwarfism who presents with right ankle pain secondary to a fall which occurred this afternoon in his bedroom.  Currently lives at a group home, he is accompanied by his caregiver today.  The patient's mom is aware that the patient is being seen and evaluated in urgent care today.  The patient currently rates the pain as 8/10 and describes the pain as sharp. The pain is localized to the medial aspect of the right ankle with radiation to the distal tibia..  He was initially able to bear weight but cannot bear weight now, presented to urgent care in a wheelchair.  The patient has been taking any medications for symptomatic relief of pain.  He has been applying ice to the affected area since arrival to [facility]..  Has normal sensation and normal strength in the right ankle and right lower extremity. No previous surgeries or injuries to the right lower extremity.  The patient's caregiver states that there was an audible thud heard when the patient fell.  The patient was unable to respond clearly to questions for the first 2 minutes after the fall.  Started speaking more clearly after the first 2 minutes after being seated on his bed.  Denies any other trauma. No agitation, somnolence, increased pain, headaches, acute vision concerns, speech changes, loss of consciousness, or reduced activity level.    Allergies:  Allergies   Allergen Reactions     No Known Allergies        Problem List:    Patient Active Problem List    Diagnosis Date Noted     Fall, initial encounter 12/19/2021     Priority: Medium     Closed fracture of proximal end of right  fibula, unspecified fracture morphology, initial encounter 12/19/2021     Priority: Medium     Closed fracture of distal end of right tibia, unspecified fracture morphology, initial encounter 12/19/2021     Priority: Medium     Benign essential hypertension 05/10/2021     Priority: Medium     MR (mental retardation), moderate 09/29/2014     Priority: Medium     Auditory hallucinations 01/15/2013     Priority: Medium     CARDIOVASCULAR SCREENING; LDL GOAL LESS THAN 160 10/31/2010     Priority: Medium     Attention deficit hyperactivity disorder (ADHD) 04/12/2005     Priority: Medium     Problem list name updated by automated process. Provider to review       Pituitary dwarfism (H) 04/12/2005     Priority: Medium     Treated in teen years with [facility]          Past Medical History:    Past Medical History:   Diagnosis Date     Acne vulgaris      Allergies      Attention deficit disorder with hyperactivity(314.01)      Moderate intellectual disabilities      Photosensitive contact dermatitis      Pituitary dwarfism (H)        Past Surgical History:    Past Surgical History:   Procedure Laterality Date     ZZC NONSPECIFIC PROCEDURE      S/P bilateral PE tubes       Family History:    Family History   Problem Relation Age of Onset     Unknown/Adopted No family hx of      Asthma No family hx of      C.A.D. No family hx of      Diabetes No family hx of      Hypertension No family hx of        Social History:  Marital Status:  Single [1]  Social History     Tobacco Use     Smoking status: Passive Smoke Exposure - Never Smoker     Smokeless tobacco: Never Used     Tobacco comment: stepfather in the house   Substance Use Topics     Alcohol use: No     Drug use: No        Medications:    ACNE MEDICATION 5 5 % topical gel  ACT ANTICAVITY FLUORIDE RINSE 0.05 % MT SOLN  diphenhydrAMINE (BENADRYL) 25 MG tablet  Efinaconazole 10 % SOLN  LAMICTAL 100 MG OR TABS  lisinopril (ZESTRIL) 20 MG tablet  mupirocin (BACTROBAN) 2 % cream  OMEGA 3  1000 MG OR CAPS  SEROQUEL 200 MG OR TABS  STRATTERA 25 MG OR CAPS          Review of Systems   Musculoskeletal: Positive for arthralgias, gait problem and joint swelling. Negative for myalgias.   Neurological: Negative for dizziness, tremors, seizures, syncope, facial asymmetry, speech difficulty, weakness, light-headedness, numbness and headaches.       Physical Exam   BP: (!) 143/86  Pulse: 84  Temp: 98  F (36.7  C)  Resp: 10  Weight: 83.5 kg (184 lb)  SpO2: 99 %      Physical Exam  Constitutional:       General: He is not in acute distress.     Appearance: Normal appearance. He is not ill-appearing, toxic-appearing or diaphoretic.   HENT:      Head: Normocephalic and atraumatic. No right periorbital erythema or left periorbital erythema.      Jaw: There is normal jaw occlusion.      Right Ear: Tympanic membrane, ear canal and external ear normal. There is no impacted cerumen.      Left Ear: Tympanic membrane, ear canal and external ear normal. There is no impacted cerumen.      Nose: Nose normal. No congestion or rhinorrhea.      Mouth/Throat:      Mouth: Mucous membranes are moist.      Pharynx: Oropharynx is clear. No oropharyngeal exudate or posterior oropharyngeal erythema.   Eyes:      General: Vision grossly intact.         Right eye: No discharge.         Left eye: No discharge.      Extraocular Movements: Extraocular movements intact.      Conjunctiva/sclera: Conjunctivae normal.      Pupils: Pupils are equal, round, and reactive to light.      Right eye: Pupil is round, reactive and not sluggish.      Left eye: Pupil is round, reactive and not sluggish.      Funduscopic exam:     Right eye: Red reflex present.         Left eye: Red reflex present.  Cardiovascular:      Pulses: Normal pulses.           Dorsalis pedis pulses are 2+ on the right side.        Posterior tibial pulses are 2+ on the right side.   Musculoskeletal:         General: Swelling and tenderness present.      Cervical back: Normal,  normal range of motion and neck supple. No edema, erythema, signs of trauma, rigidity, torticollis, tenderness or crepitus. No pain with movement, spinous process tenderness or muscular tenderness. Normal range of motion.      Thoracic back: Normal.      Lumbar back: Normal.      Right lower leg: Swelling and deformity present. No tenderness. No edema.      Left lower leg: Normal.      Right ankle: Swelling present. No deformity. Decreased range of motion.      Left ankle: Normal.        Legs:       Comments: Deformity noted over the proximal aspect of the fibula, no tenderness at the fibular head.  Has tenderness inferior to the medial malleolus of the right ankle.  Also has tenderness at the distal aspect of the tibia.  No evidence of acute swelling. No unusual tenderness, crepitance or fluctuance; no rash, wounds or lesions, compartments soft.   Lymphadenopathy:      Cervical: No cervical adenopathy.   Skin:     General: Skin is warm and dry.      Findings: No laceration, lesion or rash.   Neurological:      General: No focal deficit present.      Mental Status: He is alert and oriented to person, place, and time.      Cranial Nerves: No cranial nerve deficit.      Sensory: No sensory deficit.      Motor: No weakness.      Coordination: Coordination normal.      Gait: Gait normal.      Deep Tendon Reflexes: Reflexes normal.   Psychiatric:         Mood and Affect: Mood normal.         Behavior: Behavior normal.         Thought Content: Thought content normal.         Judgment: Judgment normal.         [facility]    Splint Application    Date/Time: 12/19/2021 8:33 PM  Performed by: Layton Cordova PA-C  Authorized by: Layton Cordova PA-C     Risks, benefits and alternatives discussed.      PRE-PROCEDURE DETAILS     Sensation:  Normal    Skin color:  Normal    PROCEDURE DETAILS     Laterality:  Right    Location:  Leg    Leg:  R lower leg    Strapping:  no      Splint type:  Long leg    Supplies:  Ortho-Glass    POST PROCEDURE DETAILS     Pain:  Improved    Sensation:  Normal    Skin color:  Normal      PROCEDURE    Patient Tolerance:  Patient tolerated the procedure well with no immediate complications              Results for orders placed or performed during the hospital encounter of 12/19/21 (from the past 24 hour(s))   Ankle XR, G/E 3 views, right    Narrative    EXAM: XR ANKLE RIGHT G/E 3 VIEWS, XR TIBIA and FIBULA RT 2 VW  LOCATION: [facility]  DATE/TIME: 12/19/2021 3:56 PM    INDICATION: Right lower extremity pain.  COMPARISON: None.      Impression    IMPRESSION:  1.  Acute spiral fracture of the right tibia distal diaphysis with 13 mm of posterolateral displacement. Minimally displaced oblique fracture of the fibula proximal metaphysis.  2.  Normal joint spacing and alignment.  3.  Soft tissue swelling in the calf.   XR Tibia & Fibula Right 2 Views    Narrative    EXAM: XR ANKLE RIGHT G/E 3 VIEWS, XR TIBIA and FIBULA RT 2 VW  LOCATION: [facility]  DATE/TIME: 12/19/2021 3:56 PM    INDICATION: Right lower extremity pain.  COMPARISON: None.      Impression    IMPRESSION:  1.  Acute spiral fracture of the right tibia distal diaphysis with 13 mm of posterolateral displacement. Minimally displaced oblique fracture of the fibula proximal metaphysis.  2.  Normal joint spacing and alignment.  3.  Soft tissue swelling in the calf.   Asymptomatic COVID-19 Virus (Coronavirus) by PCR Nasopharyngeal    Specimen: Nasopharyngeal; Swab   Result Value Ref Range    SARS CoV2 PCR Negative Negative    Narrative    Testing was performed using the tono  SARS-CoV-2 & Influenza A/B Assay on the tono  Rita  System.  This test should be ordered for the detection of SARS-COV-2 in individuals who meet SARS-CoV-2 clinical and/or epidemiological criteria. Test performance is unknown in asymptomatic patients.  This test is for in  vitro diagnostic use under the FDA EUA for laboratories certified under CLIA to perform moderate and/or high complexity testing. This test has not been FDA cleared or approved.  A negative test does not rule out the presence of PCR inhibitors in the specimen or target RNA in concentration below the limit of detection for the assay. The possibility of a false negative should be considered if the patient's recent exposure or clinical presentation suggests COVID-19.  [facility] are certified under the Clinical Laboratory Improvement Amendments of 1988 (CLIA-88) as qualified to perform moderate and/or high complexity laboratory testing.   Pelvis XR w/ unilateral hip right    Narrative    EXAM: XR PELVIS AND HIP RIGHT 1 VIEW  LOCATION: [facility]  DATE/TIME: 12/19/2021 6:02 PM    INDICATION: Pelvic and right hip pain after a fall.  COMPARISON: None.      Impression    IMPRESSION: Normal joint spaces and alignment. No fracture.   Foot  XR, G/E 3 views, right    Narrative    EXAM: XR FOOT RIGHT G/E 3 VIEWS  LOCATION: [facility]  DATE/TIME: 12/19/2021 6:01 PM    INDICATION: Right foot pain after a fall.  COMPARISON: None.      Impression    IMPRESSION:  1.  Partially visualized spiral fracture of the right tibia distal diaphysis.  2.  No additional fractures are identified.  3.  Normal foot joint spacing and alignment.       Medications   QUEtiapine (SEROquel) tablet 25 mg (has no administration in time range)   ibuprofen (ADVIL/MOTRIN) tablet 600 mg (600 mg Oral Given 12/19/21 1748)       Assessments & Plan (with Medical Decision Making)   The patient is a 36 year old male with a past medical history of cognitive challenges, attention deficit hyperactivity disorder, hypertension, and pituitary dwarfism who presents with right ankle pain secondary to a fall which occurred this afternoon in his bedroom.  Currently has pain at the medial aspect of his  right ankle with radiation to the distal tibia.    No symptoms to suggest necrotizing fasciitis or deep space infection or compartment syndrome.    X-rays of the tibia and fibula showed a minimally displaced oblique fracture of the fibula proximal metaphysis.  There is an acute spiral fracture of the right tibia distal diaphysis with 13 mm of posterior lateral displacement.  Discussed with  of [facility] orthopedics who recommend admitting the patient, needs to undergo ORIF surgery tomorrow.      Due to no hospital beds, I am keeping the patient in the emergency department until tomorrow.  Started home meds tonight in the emergency department.  The patient takes no meds at 8 AM, 4 PM, and 8 PM.  Of note, he takes a single dose of 25 mg Seroquel at 4 PM in addition to his 8 PM dose of 200 mg.     Placed a posterior splint on the patient's right lower extremity per the recommendations of orthopedic surgery.  Elevating and icing the lower extremity.  Admit orders placed.    Discussed with Dr. Stanley Santiago of [facility] who will be assuming the patient's care.      I have reviewed the nursing notes.    I have reviewed the findings, diagnosis, plan and need for follow up with the patient.  --    ED Attending Physician Attestation    I Stanley Santiago MD, MD, cared for this patient with the Advanced Practice Provider (SHABANA). I have performed a history and physical examination of the patient independent of the SHABANA. I reviewed the SHABANA's documentation above and agree with the documented findings and plan of care.     Summary of HPI, PE, ED Course   Patient is a 36 year old male evaluated in the emergency department for right leg pain after fall. Exam notable for sensation intact in the toes, short leg posterior splint right lower extremity. ED course notable for splinting and ice elevation. After the completion of care in the emergency department, the patient was admitted to  observation.    Critical Care & Procedures  Not applicable.    Medical Decision Making  The medical record was reviewed and interpreted.  This is a shared visit  Interviewed the patient, examined him, reviewed results of x-rays, assisted Layton with orders for boarding in ED.  N.p.o. post midnight anticipating orthopedic surgery tomorrow morning.  Agree with contents of note assessment and plan.      Stanley Santiago MD, MD  Emergency Medicine         New Prescriptions    No medications on file       Final diagnoses:   Closed fracture of distal end of right tibia, unspecified fracture morphology, initial encounter   Closed fracture of proximal end of right fibula, unspecified fracture morphology, initial encounter   Fall, initial encounter       12/19/2021   [facility] EMERGENCY DEPT     Layton Cordova PA-C  12/19/21 2034       Layton oCrdova PA-C  12/19/21 2038       Stanley Santiago MD  12/19/21 8113

## 2021-12-20 ENCOUNTER — APPOINTMENT (OUTPATIENT)
Dept: GENERAL RADIOLOGY | Facility: CLINIC | Age: 36
End: 2021-12-20
Attending: STUDENT IN AN ORGANIZED HEALTH CARE EDUCATION/TRAINING PROGRAM
Payer: MEDICARE

## 2021-12-20 ENCOUNTER — ANESTHESIA (OUTPATIENT)
Dept: SURGERY | Facility: CLINIC | Age: 36
End: 2021-12-20
Payer: MEDICARE

## 2021-12-20 ENCOUNTER — ANESTHESIA EVENT (OUTPATIENT)
Dept: SURGERY | Facility: CLINIC | Age: 36
End: 2021-12-20
Payer: MEDICARE

## 2021-12-20 VITALS
SYSTOLIC BLOOD PRESSURE: 143 MMHG | RESPIRATION RATE: 14 BRPM | WEIGHT: 184 LBS | OXYGEN SATURATION: 96 % | HEART RATE: 82 BPM | BODY MASS INDEX: 29.7 KG/M2 | DIASTOLIC BLOOD PRESSURE: 93 MMHG | TEMPERATURE: 98.3 F

## 2021-12-20 LAB
ABO/RH(D): NORMAL
ANION GAP SERPL CALCULATED.3IONS-SCNC: 5 MMOL/L (ref 3–14)
ANTIBODY SCREEN: NEGATIVE
BASOPHILS # BLD AUTO: 0.1 10E3/UL (ref 0–0.2)
BASOPHILS NFR BLD AUTO: 1 %
BUN SERPL-MCNC: 13 MG/DL (ref 7–30)
CALCIUM SERPL-MCNC: 9 MG/DL (ref 8.5–10.1)
CHLORIDE BLD-SCNC: 106 MMOL/L (ref 94–109)
CO2 SERPL-SCNC: 27 MMOL/L (ref 20–32)
CREAT SERPL-MCNC: 0.88 MG/DL (ref 0.66–1.25)
EOSINOPHIL # BLD AUTO: 0.2 10E3/UL (ref 0–0.7)
EOSINOPHIL NFR BLD AUTO: 1 %
ERYTHROCYTE [DISTWIDTH] IN BLOOD BY AUTOMATED COUNT: 13.3 % (ref 10–15)
GFR SERPL CREATININE-BSD FRML MDRD: >90 ML/MIN/1.73M2
GLUCOSE BLD-MCNC: 113 MG/DL (ref 70–99)
HCT VFR BLD AUTO: 38.6 % (ref 40–53)
HGB BLD-MCNC: 12.6 G/DL (ref 13.3–17.7)
HGB BLD-MCNC: 12.7 G/DL (ref 13.3–17.7)
HOLD SPECIMEN: NORMAL
IMM GRANULOCYTES # BLD: 0 10E3/UL
IMM GRANULOCYTES NFR BLD: 0 %
LYMPHOCYTES # BLD AUTO: 2.4 10E3/UL (ref 0.8–5.3)
LYMPHOCYTES NFR BLD AUTO: 19 %
MCH RBC QN AUTO: 29 PG (ref 26.5–33)
MCHC RBC AUTO-ENTMCNC: 32.9 G/DL (ref 31.5–36.5)
MCV RBC AUTO: 88 FL (ref 78–100)
MONOCYTES # BLD AUTO: 1.3 10E3/UL (ref 0–1.3)
MONOCYTES NFR BLD AUTO: 10 %
NEUTROPHILS # BLD AUTO: 8.8 10E3/UL (ref 1.6–8.3)
NEUTROPHILS NFR BLD AUTO: 69 %
NRBC # BLD AUTO: 0 10E3/UL
NRBC BLD AUTO-RTO: 0 /100
PLATELET # BLD AUTO: 330 10E3/UL (ref 150–450)
POTASSIUM BLD-SCNC: 4 MMOL/L (ref 3.4–5.3)
RBC # BLD AUTO: 4.38 10E6/UL (ref 4.4–5.9)
SODIUM SERPL-SCNC: 138 MMOL/L (ref 133–144)
SPECIMEN EXPIRATION DATE: NORMAL
WBC # BLD AUTO: 12.8 10E3/UL (ref 4–11)

## 2021-12-20 PROCEDURE — 360N000084 HC SURGERY LEVEL 4 W/ FLUORO, PER MIN: Performed by: STUDENT IN AN ORGANIZED HEALTH CARE EDUCATION/TRAINING PROGRAM

## 2021-12-20 PROCEDURE — 258N000003 HC RX IP 258 OP 636: Performed by: NURSE ANESTHETIST, CERTIFIED REGISTERED

## 2021-12-20 PROCEDURE — 96361 HYDRATE IV INFUSION ADD-ON: CPT | Performed by: EMERGENCY MEDICINE

## 2021-12-20 PROCEDURE — 710N000012 HC RECOVERY PHASE 2, PER MINUTE: Performed by: STUDENT IN AN ORGANIZED HEALTH CARE EDUCATION/TRAINING PROGRAM

## 2021-12-20 PROCEDURE — 250N000009 HC RX 250: Performed by: STUDENT IN AN ORGANIZED HEALTH CARE EDUCATION/TRAINING PROGRAM

## 2021-12-20 PROCEDURE — 370N000017 HC ANESTHESIA TECHNICAL FEE, PER MIN: Performed by: STUDENT IN AN ORGANIZED HEALTH CARE EDUCATION/TRAINING PROGRAM

## 2021-12-20 PROCEDURE — C1713 ANCHOR/SCREW BN/BN,TIS/BN: HCPCS | Performed by: STUDENT IN AN ORGANIZED HEALTH CARE EDUCATION/TRAINING PROGRAM

## 2021-12-20 PROCEDURE — 250N000011 HC RX IP 250 OP 636: Performed by: EMERGENCY MEDICINE

## 2021-12-20 PROCEDURE — C1769 GUIDE WIRE: HCPCS | Performed by: STUDENT IN AN ORGANIZED HEALTH CARE EDUCATION/TRAINING PROGRAM

## 2021-12-20 PROCEDURE — 96374 THER/PROPH/DIAG INJ IV PUSH: CPT | Performed by: EMERGENCY MEDICINE

## 2021-12-20 PROCEDURE — 85018 HEMOGLOBIN: CPT | Performed by: STUDENT IN AN ORGANIZED HEALTH CARE EDUCATION/TRAINING PROGRAM

## 2021-12-20 PROCEDURE — 86901 BLOOD TYPING SEROLOGIC RH(D): CPT | Performed by: EMERGENCY MEDICINE

## 2021-12-20 PROCEDURE — 85025 COMPLETE CBC W/AUTO DIFF WBC: CPT | Performed by: EMERGENCY MEDICINE

## 2021-12-20 PROCEDURE — 710N000009 HC RECOVERY PHASE 1, LEVEL 1, PER MIN: Performed by: STUDENT IN AN ORGANIZED HEALTH CARE EDUCATION/TRAINING PROGRAM

## 2021-12-20 PROCEDURE — 250N000011 HC RX IP 250 OP 636: Performed by: NURSE ANESTHETIST, CERTIFIED REGISTERED

## 2021-12-20 PROCEDURE — 271N000001 HC OR GENERAL SUPPLY NON-STERILE: Performed by: STUDENT IN AN ORGANIZED HEALTH CARE EDUCATION/TRAINING PROGRAM

## 2021-12-20 PROCEDURE — 36415 COLL VENOUS BLD VENIPUNCTURE: CPT | Performed by: STUDENT IN AN ORGANIZED HEALTH CARE EDUCATION/TRAINING PROGRAM

## 2021-12-20 PROCEDURE — 250N000011 HC RX IP 250 OP 636: Performed by: STUDENT IN AN ORGANIZED HEALTH CARE EDUCATION/TRAINING PROGRAM

## 2021-12-20 PROCEDURE — 999N000141 HC STATISTIC PRE-PROCEDURE NURSING ASSESSMENT: Performed by: STUDENT IN AN ORGANIZED HEALTH CARE EDUCATION/TRAINING PROGRAM

## 2021-12-20 PROCEDURE — 250N000025 HC SEVOFLURANE, PER MIN: Performed by: STUDENT IN AN ORGANIZED HEALTH CARE EDUCATION/TRAINING PROGRAM

## 2021-12-20 PROCEDURE — 250N000009 HC RX 250: Performed by: NURSE ANESTHETIST, CERTIFIED REGISTERED

## 2021-12-20 PROCEDURE — 36415 COLL VENOUS BLD VENIPUNCTURE: CPT | Performed by: EMERGENCY MEDICINE

## 2021-12-20 PROCEDURE — 999N000179 XR SURGERY CARM FLUORO LESS THAN 5 MIN W STILLS: Mod: TC

## 2021-12-20 PROCEDURE — 258N000001 HC RX 258: Performed by: EMERGENCY MEDICINE

## 2021-12-20 PROCEDURE — 80048 BASIC METABOLIC PNL TOTAL CA: CPT | Performed by: EMERGENCY MEDICINE

## 2021-12-20 PROCEDURE — 272N000001 HC OR GENERAL SUPPLY STERILE: Performed by: STUDENT IN AN ORGANIZED HEALTH CARE EDUCATION/TRAINING PROGRAM

## 2021-12-20 DEVICE — IMP SCR SYN 5.0 TI LOCK T25 STARDRIVE 38MM 04.005.528S: Type: IMPLANTABLE DEVICE | Site: HIP | Status: FUNCTIONAL

## 2021-12-20 DEVICE — IMPLANTABLE DEVICE: Type: IMPLANTABLE DEVICE | Site: HIP | Status: FUNCTIONAL

## 2021-12-20 DEVICE — IMP SCR SYN 5.0 TI LOCK T25 STARDRIVE 40MM 04.005.530S: Type: IMPLANTABLE DEVICE | Site: HIP | Status: FUNCTIONAL

## 2021-12-20 RX ORDER — NALOXONE HYDROCHLORIDE 0.4 MG/ML
0.2 INJECTION, SOLUTION INTRAMUSCULAR; INTRAVENOUS; SUBCUTANEOUS
Status: DISCONTINUED | OUTPATIENT
Start: 2021-12-20 | End: 2021-12-20 | Stop reason: HOSPADM

## 2021-12-20 RX ORDER — SODIUM CHLORIDE, SODIUM LACTATE, POTASSIUM CHLORIDE, CALCIUM CHLORIDE 600; 310; 30; 20 MG/100ML; MG/100ML; MG/100ML; MG/100ML
INJECTION, SOLUTION INTRAVENOUS CONTINUOUS
Status: DISCONTINUED | OUTPATIENT
Start: 2021-12-20 | End: 2021-12-20 | Stop reason: HOSPADM

## 2021-12-20 RX ORDER — FENTANYL CITRATE 50 UG/ML
50 INJECTION, SOLUTION INTRAMUSCULAR; INTRAVENOUS
Status: DISCONTINUED | OUTPATIENT
Start: 2021-12-20 | End: 2021-12-20 | Stop reason: HOSPADM

## 2021-12-20 RX ORDER — PROPOFOL 10 MG/ML
INJECTION, EMULSION INTRAVENOUS PRN
Status: DISCONTINUED | OUTPATIENT
Start: 2021-12-20 | End: 2021-12-20

## 2021-12-20 RX ORDER — ALBUTEROL SULFATE 0.83 MG/ML
2.5 SOLUTION RESPIRATORY (INHALATION) EVERY 4 HOURS PRN
Status: DISCONTINUED | OUTPATIENT
Start: 2021-12-20 | End: 2021-12-20 | Stop reason: HOSPADM

## 2021-12-20 RX ORDER — LISINOPRIL 10 MG/1
20 TABLET ORAL DAILY
Status: DISCONTINUED | OUTPATIENT
Start: 2021-12-20 | End: 2021-12-20 | Stop reason: HOSPADM

## 2021-12-20 RX ORDER — ONDANSETRON 4 MG/1
4 TABLET, ORALLY DISINTEGRATING ORAL EVERY 30 MIN PRN
Status: DISCONTINUED | OUTPATIENT
Start: 2021-12-20 | End: 2021-12-20 | Stop reason: HOSPADM

## 2021-12-20 RX ORDER — QUETIAPINE FUMARATE 200 MG/1
200 TABLET, FILM COATED ORAL EVERY MORNING
Status: DISCONTINUED | OUTPATIENT
Start: 2021-12-20 | End: 2021-12-20 | Stop reason: HOSPADM

## 2021-12-20 RX ORDER — PROPOFOL 10 MG/ML
INJECTION, EMULSION INTRAVENOUS CONTINUOUS PRN
Status: DISCONTINUED | OUTPATIENT
Start: 2021-12-20 | End: 2021-12-20

## 2021-12-20 RX ORDER — ACETAMINOPHEN 325 MG/1
650 TABLET ORAL EVERY 4 HOURS PRN
Status: DISCONTINUED | OUTPATIENT
Start: 2021-12-20 | End: 2021-12-20 | Stop reason: HOSPADM

## 2021-12-20 RX ORDER — BUPIVACAINE HYDROCHLORIDE 2.5 MG/ML
INJECTION, SOLUTION INFILTRATION; PERINEURAL PRN
Status: DISCONTINUED | OUTPATIENT
Start: 2021-12-20 | End: 2021-12-20 | Stop reason: HOSPADM

## 2021-12-20 RX ORDER — GABAPENTIN 300 MG/1
300 CAPSULE ORAL
Status: DISCONTINUED | OUTPATIENT
Start: 2021-12-20 | End: 2021-12-20 | Stop reason: HOSPADM

## 2021-12-20 RX ORDER — ACETAMINOPHEN 325 MG/1
650 TABLET ORAL EVERY 4 HOURS PRN
Qty: 50 TABLET | Refills: 1 | Status: SHIPPED | OUTPATIENT
Start: 2021-12-20 | End: 2022-06-27

## 2021-12-20 RX ORDER — ONDANSETRON 2 MG/ML
INJECTION INTRAMUSCULAR; INTRAVENOUS PRN
Status: DISCONTINUED | OUTPATIENT
Start: 2021-12-20 | End: 2021-12-20

## 2021-12-20 RX ORDER — DEXAMETHASONE SODIUM PHOSPHATE 4 MG/ML
INJECTION, SOLUTION INTRA-ARTICULAR; INTRALESIONAL; INTRAMUSCULAR; INTRAVENOUS; SOFT TISSUE PRN
Status: DISCONTINUED | OUTPATIENT
Start: 2021-12-20 | End: 2021-12-20

## 2021-12-20 RX ORDER — LAMOTRIGINE 100 MG/1
100 TABLET ORAL 2 TIMES DAILY
Status: DISCONTINUED | OUTPATIENT
Start: 2021-12-20 | End: 2021-12-20 | Stop reason: HOSPADM

## 2021-12-20 RX ORDER — HYDROMORPHONE HCL IN WATER/PF 6 MG/30 ML
0.4 PATIENT CONTROLLED ANALGESIA SYRINGE INTRAVENOUS EVERY 5 MIN PRN
Status: DISCONTINUED | OUTPATIENT
Start: 2021-12-20 | End: 2021-12-20 | Stop reason: HOSPADM

## 2021-12-20 RX ORDER — FENTANYL CITRATE 50 UG/ML
50 INJECTION, SOLUTION INTRAMUSCULAR; INTRAVENOUS EVERY 5 MIN PRN
Status: DISCONTINUED | OUTPATIENT
Start: 2021-12-20 | End: 2021-12-20 | Stop reason: HOSPADM

## 2021-12-20 RX ORDER — KETOROLAC TROMETHAMINE 30 MG/ML
INJECTION, SOLUTION INTRAMUSCULAR; INTRAVENOUS PRN
Status: DISCONTINUED | OUTPATIENT
Start: 2021-12-20 | End: 2021-12-20

## 2021-12-20 RX ORDER — MEPERIDINE HYDROCHLORIDE 25 MG/ML
12.5 INJECTION INTRAMUSCULAR; INTRAVENOUS; SUBCUTANEOUS
Status: DISCONTINUED | OUTPATIENT
Start: 2021-12-20 | End: 2021-12-20 | Stop reason: HOSPADM

## 2021-12-20 RX ORDER — LIDOCAINE 40 MG/G
CREAM TOPICAL
Status: DISCONTINUED | OUTPATIENT
Start: 2021-12-20 | End: 2021-12-20 | Stop reason: HOSPADM

## 2021-12-20 RX ORDER — LIDOCAINE HYDROCHLORIDE AND EPINEPHRINE 10; 10 MG/ML; UG/ML
INJECTION, SOLUTION INFILTRATION; PERINEURAL PRN
Status: DISCONTINUED | OUTPATIENT
Start: 2021-12-20 | End: 2021-12-20 | Stop reason: HOSPADM

## 2021-12-20 RX ORDER — CEFAZOLIN SODIUM 2 G/100ML
2 INJECTION, SOLUTION INTRAVENOUS
Status: DISCONTINUED | OUTPATIENT
Start: 2021-12-20 | End: 2021-12-20 | Stop reason: HOSPADM

## 2021-12-20 RX ORDER — ONDANSETRON 2 MG/ML
4 INJECTION INTRAMUSCULAR; INTRAVENOUS EVERY 30 MIN PRN
Status: DISCONTINUED | OUTPATIENT
Start: 2021-12-20 | End: 2021-12-20 | Stop reason: HOSPADM

## 2021-12-20 RX ORDER — ACETAMINOPHEN 325 MG/1
975 TABLET ORAL ONCE
Status: DISCONTINUED | OUTPATIENT
Start: 2021-12-20 | End: 2021-12-20 | Stop reason: HOSPADM

## 2021-12-20 RX ORDER — GLYCOPYRROLATE 0.2 MG/ML
INJECTION, SOLUTION INTRAMUSCULAR; INTRAVENOUS PRN
Status: DISCONTINUED | OUTPATIENT
Start: 2021-12-20 | End: 2021-12-20

## 2021-12-20 RX ORDER — NAPROXEN 500 MG/1
500 TABLET ORAL 2 TIMES DAILY WITH MEALS
Qty: 30 TABLET | Refills: 0 | Status: SHIPPED | OUTPATIENT
Start: 2021-12-20 | End: 2022-06-27

## 2021-12-20 RX ORDER — AMOXICILLIN 250 MG
1-2 CAPSULE ORAL 2 TIMES DAILY
Qty: 30 TABLET | Refills: 0 | Status: SHIPPED | OUTPATIENT
Start: 2021-12-20 | End: 2023-01-19

## 2021-12-20 RX ORDER — CEFAZOLIN SODIUM 2 G/100ML
2 INJECTION, SOLUTION INTRAVENOUS SEE ADMIN INSTRUCTIONS
Status: DISCONTINUED | OUTPATIENT
Start: 2021-12-20 | End: 2021-12-20 | Stop reason: HOSPADM

## 2021-12-20 RX ORDER — OXYCODONE HYDROCHLORIDE 5 MG/1
5 TABLET ORAL EVERY 4 HOURS PRN
Status: DISCONTINUED | OUTPATIENT
Start: 2021-12-20 | End: 2021-12-20 | Stop reason: HOSPADM

## 2021-12-20 RX ORDER — NALOXONE HYDROCHLORIDE 0.4 MG/ML
0.4 INJECTION, SOLUTION INTRAMUSCULAR; INTRAVENOUS; SUBCUTANEOUS
Status: DISCONTINUED | OUTPATIENT
Start: 2021-12-20 | End: 2021-12-20 | Stop reason: HOSPADM

## 2021-12-20 RX ORDER — ATOMOXETINE 25 MG/1
25 CAPSULE ORAL DAILY
Status: DISCONTINUED | OUTPATIENT
Start: 2021-12-20 | End: 2021-12-20 | Stop reason: HOSPADM

## 2021-12-20 RX ORDER — HYDROMORPHONE HYDROCHLORIDE 1 MG/ML
0.2 INJECTION, SOLUTION INTRAMUSCULAR; INTRAVENOUS; SUBCUTANEOUS
Status: DISCONTINUED | OUTPATIENT
Start: 2021-12-20 | End: 2021-12-20 | Stop reason: HOSPADM

## 2021-12-20 RX ORDER — OXYCODONE HYDROCHLORIDE 5 MG/1
5-10 TABLET ORAL EVERY 4 HOURS PRN
Qty: 30 TABLET | Refills: 0 | Status: SHIPPED | OUTPATIENT
Start: 2021-12-20 | End: 2023-01-19

## 2021-12-20 RX ORDER — ONDANSETRON 4 MG/1
4-8 TABLET, ORALLY DISINTEGRATING ORAL EVERY 8 HOURS PRN
Qty: 4 TABLET | Refills: 0 | Status: SHIPPED | OUTPATIENT
Start: 2021-12-20 | End: 2022-06-27

## 2021-12-20 RX ORDER — FENTANYL CITRATE 50 UG/ML
INJECTION, SOLUTION INTRAMUSCULAR; INTRAVENOUS PRN
Status: DISCONTINUED | OUTPATIENT
Start: 2021-12-20 | End: 2021-12-20

## 2021-12-20 RX ADMIN — ONDANSETRON 4 MG: 2 INJECTION INTRAMUSCULAR; INTRAVENOUS at 17:59

## 2021-12-20 RX ADMIN — ROCURONIUM BROMIDE 50 MG: 50 INJECTION, SOLUTION INTRAVENOUS at 17:59

## 2021-12-20 RX ADMIN — PROPOFOL 50 MG: 10 INJECTION, EMULSION INTRAVENOUS at 19:24

## 2021-12-20 RX ADMIN — SODIUM CHLORIDE, POTASSIUM CHLORIDE, SODIUM LACTATE AND CALCIUM CHLORIDE: 600; 310; 30; 20 INJECTION, SOLUTION INTRAVENOUS at 15:23

## 2021-12-20 RX ADMIN — PROPOFOL 50 MG: 10 INJECTION, EMULSION INTRAVENOUS at 18:13

## 2021-12-20 RX ADMIN — SODIUM CHLORIDE, POTASSIUM CHLORIDE, SODIUM LACTATE AND CALCIUM CHLORIDE: 600; 310; 30; 20 INJECTION, SOLUTION INTRAVENOUS at 18:23

## 2021-12-20 RX ADMIN — PROPOFOL 25 MCG/KG/MIN: 10 INJECTION, EMULSION INTRAVENOUS at 18:10

## 2021-12-20 RX ADMIN — DEXAMETHASONE SODIUM PHOSPHATE 4 MG: 4 INJECTION, SOLUTION INTRA-ARTICULAR; INTRALESIONAL; INTRAMUSCULAR; INTRAVENOUS; SOFT TISSUE at 17:59

## 2021-12-20 RX ADMIN — CEFAZOLIN SODIUM 2 G: 2 INJECTION, SOLUTION INTRAVENOUS at 18:00

## 2021-12-20 RX ADMIN — HYDROMORPHONE HYDROCHLORIDE 0.2 MG: 1 INJECTION, SOLUTION INTRAMUSCULAR; INTRAVENOUS; SUBCUTANEOUS at 06:17

## 2021-12-20 RX ADMIN — SUGAMMADEX 100 MG: 100 INJECTION, SOLUTION INTRAVENOUS at 19:10

## 2021-12-20 RX ADMIN — HYDROMORPHONE HYDROCHLORIDE 0.5 MG: 1 INJECTION, SOLUTION INTRAMUSCULAR; INTRAVENOUS; SUBCUTANEOUS at 19:30

## 2021-12-20 RX ADMIN — HYDROMORPHONE HYDROCHLORIDE 0.5 MG: 1 INJECTION, SOLUTION INTRAMUSCULAR; INTRAVENOUS; SUBCUTANEOUS at 19:54

## 2021-12-20 RX ADMIN — MIDAZOLAM 2 MG: 1 INJECTION INTRAMUSCULAR; INTRAVENOUS at 17:51

## 2021-12-20 RX ADMIN — KETOROLAC TROMETHAMINE 30 MG: 30 INJECTION, SOLUTION INTRAMUSCULAR at 18:15

## 2021-12-20 RX ADMIN — GLYCOPYRROLATE 0.2 MG: 0.2 INJECTION, SOLUTION INTRAMUSCULAR; INTRAVENOUS at 17:59

## 2021-12-20 RX ADMIN — PROPOFOL 150 MG: 10 INJECTION, EMULSION INTRAVENOUS at 17:59

## 2021-12-20 RX ADMIN — DEXTROSE AND SODIUM CHLORIDE: 5; 450 INJECTION, SOLUTION INTRAVENOUS at 06:16

## 2021-12-20 RX ADMIN — FENTANYL CITRATE 100 MCG: 50 INJECTION, SOLUTION INTRAMUSCULAR; INTRAVENOUS at 17:53

## 2021-12-20 ASSESSMENT — ACTIVITIES OF DAILY LIVING (ADL)
ADLS_ACUITY_SCORE: 9
ADLS_ACUITY_SCORE: 7
ADLS_ACUITY_SCORE: 9
ADLS_ACUITY_SCORE: 9
ADLS_ACUITY_SCORE: 7
ADLS_ACUITY_SCORE: 9
ADLS_ACUITY_SCORE: 9
ADLS_ACUITY_SCORE: 7
ADLS_ACUITY_SCORE: 7
ADLS_ACUITY_SCORE: 9
ADLS_ACUITY_SCORE: 7
ADLS_ACUITY_SCORE: 9
ADLS_ACUITY_SCORE: 7
ADLS_ACUITY_SCORE: 9
ADLS_ACUITY_SCORE: 9

## 2021-12-20 NOTE — ED PROVIDER NOTES
Emergency Department Patient Sign-out       Brief HPI:  This is a 36 year old male signed out to me by Dr. Decker.  See initial ED Provider note for details of the presentation.  Any significant events that occurred prior to my assuming care were also reviewed.     Exam:   Patient Vitals for the past 24 hrs:   BP Temp Temp src Pulse Resp SpO2   12/20/21 2100 (!) 143/93 -- -- 82 14 96 %   12/20/21 2045 (!) 136/91 -- -- 80 12 96 %   12/20/21 2030 134/89 98.3  F (36.8  C) Oral 82 19 98 %   12/20/21 2015 127/84 -- -- -- 12 94 %   12/20/21 2010 118/76 99  F (37.2  C) Axillary -- 14 99 %           ED RESULTS:   Results for orders placed or performed during the hospital encounter of 12/19/21 (from the past 24 hour(s))   XR Surgery LEE ANN Fluoro L/T 5 Min w Stills     Status: None    Collection Time: 12/20/21  7:43 PM    Narrative    This exam was marked as non-reportable because it will not be read by a   radiologist or a [place] non-radiologist provider.             ED MEDICATIONS:   Medications   ibuprofen (ADVIL/MOTRIN) tablet 600 mg (600 mg Oral Given 12/19/21 1748)   QUEtiapine (SEROquel) tablet 25 mg (25 mg Oral Given 12/19/21 2232)       ED COURSE:  0749. Patient presented on 12/19 at 2:46 PM. He experienced a fall just prior to arrival. He presented with right ankle pain. He was found to have an acute oblique fracture of the fibula proximal metaphysis, minimally displaced. He was also found to have an acute spiral fracture of the right tibia distal diaphysis with 13 mm of posterior lateral displacement. [facility] orthopedics was consulted and plan is to have ORIF surgery today. He was placed in a posterior splint. He received medication for pain and sedation for sleep.    Impression:    ICD-10-CM    1. Closed fracture of distal end of right tibia, unspecified fracture morphology, initial encounter  S82.301A XR Tibia & Fibula Right 2 Views   2. Closed fracture of proximal end of right fibula, unspecified  fracture morphology, initial encounter  S82.831A acetaminophen (TYLENOL) 325 MG tablet     naproxen (NAPROSYN) 500 MG tablet     oxyCODONE (ROXICODONE) 5 MG tablet     senna-docusate (SENOKOT-S/PERICOLACE) 8.6-50 MG tablet     ondansetron (ZOFRAN-ODT) 4 MG ODT tab   3. Fall, initial encounter  W19.XXXA    4. Closed fracture of distal end of right fibula, unspecified fracture morphology, initial encounter  S82.831A    5. Encounter for screening laboratory testing for severe acute respiratory syndrome coronavirus 2 (SARS-CoV-2)  Z20.822          MD Dennise Tristan Jason M, MD  12/21/21 8209

## 2021-12-20 NOTE — OP NOTE
DATE OF SURGERY: 12/20/21  PREOPERATIVE DIAGNOSIS: Right distal third tibia fracture, closed  POSTOPERATIVE DIAGNOSIS: Same as above     PROCEDURE: Right tibia intramedullary nail     SURGEON: Saleem Painter MD  ASSISTANT:  Mary Anne Epperson PA-C. A skilled assistant was necessary for all portions of the surgery due to its complexity including retraction, fracture reduction, nail insertion, and interlock screw placement    ANESTHESIA:  general  TOURNIQUET TIME:   None  ESTIMATED BLOOD LOSS:  100 ml    SPECIMENS:  None.   DRAINS:  None.   COMPLICATIONS:  None apparent.     FINDINGS:  Perc clamp for reduction    IMPLANTS: [employer]ynthes EX tibial nail 10x330 with 4 interlocking screws, suprapatellar approach    BRIEF HISTORY:  Isaiah Dominguez is a 36 year old male with a history of intellectual disability who resides in a group home and sustained a twisting injury to his right lower extremity on 12/19/2021.  He was seen in emergency room and found to have a spiral distal third tibia shaft fracture.  He was splinted and board in the emergency department overnight.  Risk benefits and alternatives to operative and nonoperative treatment were reviewed with the patient's mother.  Discussed the nonoperative treatment including cast immobilization daily some residual deformity in the leg and would require immobilization of the knee and ankle for about 6 weeks.  Discussed surgical intervention which would be for intramedullary nail placed down across the fracture site which would allow for improved reduction.  Risks of surgery include infection, malreduction, and risk of anesthesia.  Surgery does allow for early range of motion of the knee and the ankle.  Also may allow for earlier weightbearing.  Patient mother elected proceed with surgery.      DESCRIPTION OF PROCEDURE:  The patient was identified in the preoperative holding area.  The informed consent was reviewed. The operative site was identified and marked. The patient  was brought to the operating room and positioned supine on the operating room table where anesthesia was induced.  The right lower extremity was placed up on a bone foam ramp and prepped and draped in usual sterile fashion.  IV antibiotics were given prior to incision.  A surgical timeout was completed.    4 cm incision was made just off the superior aspect of the patella.  This was taken down the quadriceps tendon at which was incised in line with the incision.  Patellofemoral joint was easily palpable.  The suprapatellar aiming sleeve was then placed down within the joint.  Darting wire was placed on the proximal tibia and driven into the metaphysis.  The opening reamer was used followed by a ball-tipped guidewire which is down placed across the fracture site.  Fracture reduction was obtained at this time using perc clamp.  Sequential reaming then took place up to a size 11.5 and a 10  mm x 330 mm nail was opened and inserted.  X-ray was used to evaluate the position of the nail.  2 distal interlocking screws were placed using perfect [place] technique followed by 2 proximal screws through the aiming arm.  Final x-rays were obtained and aiming instrumentation was removed.  Wounds were irrigated and local anesthetic was placed.  Wounds were closed with 0 Vicryl for the extensor mechanism followed by 2-0 Vicryl and nylon for the skin.  Short leg splint was placed.    The patient tolerated the procedure well and there were no immediate complications. All sponge and needle counts were correct at the end of the case.      POSTOPERATIVE PLAN:   Patient will plan for discharge home today with his mother.  Strict return precautions were given to them for intolerable or escalating pain not controlled with oral pain medication.  He will use crutches and be toe-touch weightbearing in the splint for the next 2 weeks.  Plan to transition to a boot at the 2-week evy and maintain toe-touch weightbearing for 6 weeks.    Saleem  MD In[place]

## 2021-12-20 NOTE — ED NOTES
Bed: ED14  Expected date: 12/19/21  Expected time: 5:33 PM  Means of arrival:   Comments:  ARCHANA 3

## 2021-12-21 ENCOUNTER — TELEPHONE (OUTPATIENT)
Dept: SURGERY | Facility: CLINIC | Age: 36
End: 2021-12-21
Payer: MEDICARE

## 2021-12-21 ENCOUNTER — PATIENT OUTREACH (OUTPATIENT)
Dept: CARE COORDINATION | Facility: CLINIC | Age: 36
End: 2021-12-21

## 2021-12-21 DIAGNOSIS — S82.301A CLOSED FRACTURE OF DISTAL END OF RIGHT TIBIA, UNSPECIFIED FRACTURE MORPHOLOGY, INITIAL ENCOUNTER: Primary | ICD-10-CM

## 2021-12-21 DIAGNOSIS — Z71.89 OTHER SPECIFIED COUNSELING: ICD-10-CM

## 2021-12-21 NOTE — PROGRESS NOTES
Clinic Care Coordination Contact  [facility]/Voicemail       Clinical Data: Care Coordinator Outreach  Outreach attempted x 1.  Left message on patient's voicemail with call back information and requested return call.  Care Coordinator will try to reach patient again in 1-2 business days.      Donna Hfofmann  [phone]  Care

## 2021-12-21 NOTE — CONSULTS
[facility] Consultation    Consultation   Level of consult: Consult, follow and place orders    Isaiah Dominguez,  1985, MRN [id number]     Admitting Dx: Fall, initial encounter [W19.XXXA]  Closed fracture of proximal end of right fibula, unspecified fracture morphology, initial encounter [S82.831A]  Closed fracture of distal end of right tibia, unspecified fracture morphology, initial encounter [S82.301A]     PCP: No Ref-Primary, Physician, None     Code status:  Full     Extended Emergency Contact Information  Primary Emergency Contact: SiriaBrenna  Address: [address] [place] [address]  Home Phone: [phone]  Mobile Phone: [phone]  Relation: Mother       Assessment:  36 yo M with intellectual disability and a distal 1/3 tibial shaft fracture    Plan:  Reviewed operative and non operative management with mom; discussed risks, benefits, and alternatives for each. Advantages to intramedually nail included better chance for alignment WNL. Risks include infection, anesthesia, possible need for revision surgery.     Patient and mom elect to proceed. Given extremely limited inpatient beds available in the hospital, discussed possibility of discharge home after surgery. Reviewed signs and sy,ptoms concerning for compartment syndrome including escalating pain that is not improving with pain medicine.     Active Problems:    Fall, initial encounter    Closed fracture of proximal end of right fibula, unspecified fracture morphology, initial encounter    Closed fracture of distal end of right tibia, unspecified fracture morphology, initial encounter       Chief Complaint  Right tibia fracture     HPI  We have been requested by Dr. Santiago to evaluate Isaiah Dominguez who is a 36 year old year old male for his right tibia fracture. The patient has intelectual disability and had an unwitnessed injury at his group home. He was found to have an isolated distal tibia fracture.       History is obtained from the patient and the patient's parent(s); as well as the ER physician and medical record.      Past Medical History  Past Medical History:   Diagnosis Date     Acne vulgaris      Allergies      Attention deficit disorder with hyperactivity(314.01)      Moderate intellectual disabilities      Photosensitive contact dermatitis      Pituitary dwarfism (H)        Surgical History  Past Surgical History:   Procedure Laterality Date     ZZC NONSPECIFIC PROCEDURE      S/P bilateral PE tubes        Social History  Social History     Socioeconomic History     Marital status: Single     Spouse name: Not on file     Number of children: Not on file     Years of education: Not on file     Highest education level: Not on file   Occupational History     Employer: DISABLED   Tobacco Use     Smoking status: Passive Smoke Exposure - Never Smoker     Smokeless tobacco: Never Used     Tobacco comment: stepfather in the house   Substance and Sexual Activity     Alcohol use: No     Drug use: No     Sexual activity: Never   Other Topics Concern     Parent/sibling w/ CABG, MI or angioplasty before 65F 55M? Not Asked   Social History Narrative     Not on file     Social Determinants of Health     Financial Resource Strain: Not on file   Food Insecurity: Not on file   Transportation Needs: Not on file   Physical Activity: Not on file   Stress: Not on file   Social Connections: Not on file   Intimate Partner Violence: Not on file   Housing Stability: Not on file       Family History  Family History   Problem Relation Age of Onset     Unknown/Adopted No family hx of      Asthma No family hx of      C.A.D. No family hx of      Diabetes No family hx of      Hypertension No family hx of         Allergies:  No known allergies      Current Medications:  Current Facility-Administered Medications   Medication     acetaminophen (TYLENOL) tablet 650 mg     acetaminophen (TYLENOL) tablet 975 mg     atomoxetine (STRATTERA)  capsule 25 mg     dextrose 5% and 0.45% NaCl infusion     fentaNYL (PF) (SUBLIMAZE) injection 50 mcg     HYDROmorphone (PF) (DILAUDID) injection 0.2 mg     ibuprofen (ADVIL/MOTRIN) tablet 600 mg     lactated ringers infusion     lamoTRIgine (LaMICtal) tablet 100 mg     lisinopril (ZESTRIL) tablet 20 mg     meperidine (DEMEROL) injection 12.5 mg     naloxone (NARCAN) injection 0.2 mg    Or     naloxone (NARCAN) injection 0.4 mg    Or     naloxone (NARCAN) injection 0.2 mg    Or     naloxone (NARCAN) injection 0.4 mg     ondansetron (ZOFRAN-ODT) ODT tab 4 mg    Or     ondansetron (ZOFRAN) injection 4 mg     oxyCODONE (ROXICODONE) tablet 5 mg     prochlorperazine (COMPAZINE) injection 5 mg    Or     prochlorperazine (COMPAZINE) injection 10 mg     QUEtiapine (SEROquel) tablet 200 mg     Current Outpatient Medications   Medication Sig     acetaminophen (TYLENOL) 325 MG tablet Take 2 tablets (650 mg) by mouth every 4 hours as needed for mild pain     diphenhydrAMINE (BENADRYL) 25 MG tablet Take 1 tablet (25 mg) by mouth every 8 hours as needed for itching or allergies     LAMICTAL 100 MG OR TABS 1 TABLET TWICE DAILY     lisinopril (ZESTRIL) 20 MG tablet Take 1 tablet (20 mg) by mouth daily     mupirocin (BACTROBAN) 2 % cream Apply topically 3 times daily     naproxen (NAPROSYN) 500 MG tablet Take 1 tablet (500 mg) by mouth 2 times daily (with meals)     OMEGA 3 1000 MG OR CAPS 2 CAPSULES ORALLY TWICE DAILY     ondansetron (ZOFRAN-ODT) 4 MG ODT tab Take 1-2 tablets (4-8 mg) by mouth every 8 hours as needed for nausea     oxyCODONE (ROXICODONE) 5 MG tablet Take 1-2 tablets (5-10 mg) by mouth every 4 hours as needed for moderate to severe pain     senna-docusate (SENOKOT-S/PERICOLACE) 8.6-50 MG tablet Take 1-2 tablets by mouth 2 times daily     SEROQUEL 200 MG OR TABS Take one tablet by mouth every evening.     STRATTERA 25 MG OR CAPS 1 tab 8 am     ACNE MEDICATION 5 5 % topical gel APPLY TOPICALLY AT BEDTIME     ACT  ANTICAVITY FLUORIDE RINSE 0.05 % MT SOLN bid     Efinaconazole 10 % SOLN Externally apply topically daily Apply to toenail once daily for 48 weeks       Review of Systems:  CONSTITUTIONAL: NEGATIVE for fever, chills, change in weight  ENT/MOUTH: NEGATIVE for ear, mouth and throat problems  RESP: NEGATIVE for significant cough or SOB  CV: NEGATIVE for chest pain, palpitations or peripheral edema  MUSCULOSKELETAL: NEGATIVE for significant arthralgias or myalgia and POSITIVE  for right tibia fracture  NEURO: NEGATIVE for numbness or tingling   PSYCHIATRIC: NEGATIVE for changes in mood or affect    Physical Exam:  Temp:  [98.2  F (36.8  C)-99.1  F (37.3  C)] 98.3  F (36.8  C)  Pulse:  [80-89] 82  Resp:  [12-19] 14  BP: (118-143)/(76-93) 143/93  SpO2:  [94 %-100 %] 96 %    Well appearing   No distress  Alert and oriented  Non labored respirations  Toes warm and well perfused  Splint to the RLE intact and left in place  Sensation intact in the exposed toes  Wiggles toes without difficulty  No pain with passive ROM     Pertinent Labs  Lab Results: personally reviewed.  Lab Results   Component Value Date    WBC 12.8 (H) 12/20/2021    HGB 12.6 (L) 12/20/2021    HCT 38.6 (L) 12/20/2021    MCV 88 12/20/2021     12/20/2021       Pertinent Radiology  Distal 1/3 spiral tibia fracture       Saleem Painter MD

## 2021-12-21 NOTE — ANESTHESIA POSTPROCEDURE EVALUATION
Patient: Isaiah Dominguez    Procedure: Procedure(s):  Right tibial nail       Diagnosis:Closed fracture of right distal tibia [S82.301A]  Diagnosis Additional Information: No value filed.    Anesthesia Type:  General    Note:  Disposition: Outpatient   Postop Pain Control: Uneventful            Sign Out: Well controlled pain   PONV: No   Neuro/Psych: Uneventful            Sign Out: Acceptable/Baseline neuro status   Airway/Respiratory: Uneventful            Sign Out: Acceptable/Baseline resp. status   CV/Hemodynamics: Uneventful            Sign Out: Acceptable CV status; No obvious hypovolemia; No obvious fluid overload   Other NRE: NONE   DID A NON-ROUTINE EVENT OCCUR? No           Last vitals:  Vitals Value Taken Time   /84 12/20/21 2015   Temp 37.2  C (99  F) 12/20/21 2010   Pulse 82 12/20/21 2028   Resp 10 12/20/21 2028   SpO2 94 % 12/20/21 2028   Vitals shown include unvalidated device data.    Electronically Signed By: MARYCRUZ Rivero CRNA  December 20, 2021  8:29 PM

## 2021-12-21 NOTE — DISCHARGE INSTRUCTIONS
Same Day Surgery Discharge Instructions  Special Precautions After Surgery - Adult    1. It is not unusual to feel lightheaded or faint, up to 24 hours after surgery or while taking pain medication.  If you have these symptoms; sit for a few minutes before standing and have someone assist you when getting up.  2. You should rest and relax for the next 24 hours and must have someone stay with you for at least 24 hours after your discharge.  3. DO NOT DRIVE any vehicle or operate mechanical equipment for 24 hours following the end of your surgery.  DO NOT DRIVE while taking narcotic pain medications that have been prescribed by your physician.  If you had a limb operated on, you must be able to use it fully to drive.  4. DO NOT drink alcoholic beverages for 24 hours following surgery or while taking prescription pain medication.  5. Drink clear liquids (apple juice, ginger ale, broth, 7-Up, etc.).  Progress to your regular diet as you feel able.  6. Any questions call your physician and do not make important decisions for 24 hours.      __________________________________________________________________________________________________________________________________  IMPORTANT NUMBERS:    [facility] Main Number:  [phone], [phone]  Pharmacy:  [phone]  Same Day Surgery:  [phone], Monday - Friday until 8:30 p.m.  [facility]:  [phone]

## 2021-12-21 NOTE — TELEPHONE ENCOUNTER
Patient's mother calling with concerns regarding patient using crutches post op ORIF tibia yesterday. Patient is not able to use crutches safely, wondering if a walker would be better for him. Unsure how to fit him with walker. Feels that PT appointment is necessary to get him to use walker safely.

## 2021-12-21 NOTE — ANESTHESIA PREPROCEDURE EVALUATION
Anesthesia Pre-Procedure Evaluation    Patient: Isaiah Dominguez   MRN: [id number] : 1985        Preoperative Diagnosis: Closed fracture of right distal tibia [S82.301A]    Procedure : Procedure(s):  Open Reduction Internal Fixation of Distal Tibia          Past Medical History:   Diagnosis Date     Acne vulgaris      Allergies      Attention deficit disorder with hyperactivity(314.01)      Moderate intellectual disabilities      Photosensitive contact dermatitis      Pituitary dwarfism (H)       Past Surgical History:   Procedure Laterality Date     ZZC NONSPECIFIC PROCEDURE      S/P bilateral PE tubes      Allergies   Allergen Reactions     No Known Allergies       Social History     Tobacco Use     Smoking status: Passive Smoke Exposure - Never Smoker     Smokeless tobacco: Never Used     Tobacco comment: stepfather in the house   Substance Use Topics     Alcohol use: No      Wt Readings from Last 1 Encounters:   21 83.5 kg (184 lb)        Anesthesia Evaluation   Pt has had prior anesthetic.     No history of anesthetic complications       ROS/MED HX  ENT/Pulmonary:       Neurologic:  - neg neurologic ROS     Cardiovascular:     (+) hypertension-----    METS/Exercise Tolerance:     Hematologic:  - neg hematologic  ROS     Musculoskeletal:   (+) fracture,     GI/Hepatic:  - neg GI/hepatic ROS     Renal/Genitourinary:  - neg Renal ROS     Endo: Comment: Pituitary dwarf. - neg endo ROS     Psychiatric/Substance Use:  - neg psychiatric ROS     Infectious Disease:  - neg infectious disease ROS     Malignancy:  - neg malignancy ROS     Other:  - neg other ROS          Physical Exam    Airway        Mallampati: I   TM distance: > 3 FB   Neck ROM: full   Mouth opening: > 3 cm    Respiratory Devices and Support         Dental  no notable dental history         Cardiovascular   cardiovascular exam normal          Pulmonary   pulmonary exam normal                OUTSIDE LABS:  CBC:   Lab Results   Component  Value Date    WBC 12.8 (H) 12/20/2021    WBC 7.2 02/25/2021    HGB 12.6 (L) 12/20/2021    HGB 12.7 (L) 12/20/2021    HCT 38.6 (L) 12/20/2021    HCT 41.5 02/25/2021     12/20/2021     02/25/2021     BMP:   Lab Results   Component Value Date     12/20/2021     02/25/2021    POTASSIUM 4.0 12/20/2021    POTASSIUM 3.9 02/25/2021    CHLORIDE 106 12/20/2021    CHLORIDE 106 02/25/2021    CO2 27 12/20/2021    CO2 29 02/25/2021    BUN 13 12/20/2021    BUN 9 02/25/2021    CR 0.88 12/20/2021    CR 0.74 02/25/2021     (H) 12/20/2021     (H) 02/25/2021     COAGS: No results found for: PTT, INR, FIBR  POC: No results found for: BGM, HCG, HCGS  HEPATIC:   Lab Results   Component Value Date    ALBUMIN 4.3 09/29/2014    PROTTOTAL 8.2 09/29/2014    ALT 40 09/29/2014    AST 23 09/29/2014    ALKPHOS 100 09/29/2014    BILITOTAL 0.2 09/29/2014     OTHER:   Lab Results   Component Value Date    ELMER 9.0 12/20/2021    TSH 2.06 04/07/2011       Anesthesia Plan    ASA Status:  2      Anesthesia Type: General.     - Airway: ETT   Induction: Intravenous.   Maintenance: Balanced.        Consents    Anesthesia Plan(s) and associated risks, benefits, and realistic alternatives discussed. Questions answered and patient/representative(s) expressed understanding.     - Discussed: Risks, Benefits and Alternatives for BOTH SEDATION and the PROCEDURE were discussed     - Discussed with:  Patient, Parent (Mother and/or Father)         Postoperative Care       PONV prophylaxis: Ondansetron (or other 5HT-3), Dexamethasone or Solumedrol, Background Propofol Infusion     Comments:                MARYCRUZ Aldana CRNA

## 2021-12-21 NOTE — ANESTHESIA CARE TRANSFER NOTE
Patient: Isaiah Dominguez    Procedure: Procedure(s):  Right tibial nail       Diagnosis: Closed fracture of right distal tibia [S82.301A]  Diagnosis Additional Information: No value filed.    Anesthesia Type:   General     Note:    Oropharynx: oropharynx clear of all foreign objects  Level of Consciousness: drowsy  Oxygen Supplementation: face mask    Independent Airway: airway patency satisfactory and stable  Dentition: dentition unchanged  Vital Signs Stable: post-procedure vital signs reviewed and stable  Report to RN Given: handoff report given  Patient transferred to: PACU    Handoff Report: Identifed the Patient, Identified the Reponsible Provider, Reviewed the pertinent medical history, Discussed the surgical course, Reviewed Intra-OP anesthesia mangement and issues during anesthesia, Set expectations for post-procedure period and Allowed opportunity for questions and acknowledgement of understanding      Vitals:  Vitals Value Taken Time   BP     Temp     Pulse     Resp     SpO2         Electronically Signed By: MARYCRUZ Rivero CRNA  December 20, 2021  8:05 PM

## 2021-12-22 NOTE — PROGRESS NOTES
"Clinic Care Coordination Contact  [facility]: Post-Discharge Note  SITUATION                                                      Admission:    Admission Date: 12/19/21   Reason for Admission: Fall, initial encounter  Discharge:   Discharge Date: 12/20/21  Discharge Diagnosis: Closed fracture of proximal end of right fibula    BACKGROUND                                                      We have been requested by Dr. Santiago to evaluate Isaiah Dominguez who is a 36 year old year old male for his right tibia fracture. The patient has intelectual disability and had an unwitnessed injury at his group home. He was found to have an isolated distal tibia fracture.      History is obtained from the patient and the patient's parent(s); as well as the ER physician and medical record    ASSESSMENT      Enrollment  Primary Care Care Coordination Status: Not a Candidate    Discharge Assessment  How are you doing now that you are home?: \" He's doing fine\"  How are your symptoms? (Red Flag symptoms escalate to triage hotline per guidelines): Improved  Do you feel your condition is stable enough to be safe at home until your provider visit?: Yes  Does the patient have their discharge instructions? : No - Review discharge instructions  Does the patient have questions regarding their discharge instructions? : No  Were you started on any new medications or were there changes to any of your previous medications? : Yes  Does the patient have all of their medications?: Yes  Do you have questions regarding any of your medications? : No  Do you have all of your needed medical supplies or equipment (DME)?  (i.e. oxygen tank, CPAP, cane, etc.): Yes  Discharge follow-up appointment scheduled within 14 calendar days? : Yes  Discharge Follow Up Appointment Date: 12/27/21  Discharge Follow Up Appointment Scheduled with?: Specialty Care Provider    Post-op ([facility] Only)  If the patient had a surgery or procedure, do they have any " questions for a nurse?: No             PLAN                                                      Outpatient Plan:  Patient to arrange for return to clinic appointment about two  weeks after surgery. Call [phone] to schedule  Elevate affected extremity  Ice to affected area  Ice pack to affected area PRN (as needed).  No dressing change  until follow up clinic appointment. Do not get splint wet.  Weight bearing status - Toe touch    Future Appointments   Date Time Provider Department Center   12/27/2021 11:00 AM Annie Villalobos PT [facility]         For any urgent concerns, please contact our 24 hour nurse triage line: [phone] ([phone])         Donna Hoffmann MA

## 2021-12-22 NOTE — OR NURSING
Patients mother called wondering what to do about the patients pain. She was asked what she had given him. She stated that she had not given him any pain medications up to this time. It was suggested to the patients mother that she give a dose of the oxycodone that was prescribed. Patients mother called at 1700 nearly 20 hours after patient discharged.

## 2021-12-27 ENCOUNTER — TELEPHONE (OUTPATIENT)
Dept: FAMILY MEDICINE | Facility: CLINIC | Age: 36
End: 2021-12-27
Payer: MEDICARE

## 2021-12-27 NOTE — TELEPHONE ENCOUNTER
I talked with Ann Marie.  Advised he would need to be seen  Should also check with insurance company  Lelia Contreras RN

## 2021-12-27 NOTE — TELEPHONE ENCOUNTER
Reason for Call: Request for an order or referral:    Order or referral being requested: Patient was admitted to hospital and discharged from breaking his Tibia. Patient was discharged on 12/20 and has been with guardian since that Group Home can not care for him. The guardian can not take care of him much longer. Beth the  is calling asking for a order/referral for patient to go to TCU. I told Beth that they should call the TCUs and ask if they would take a referral/ order form PCP. Also to check with insurance that he has already been discharged from the hospital.   Beth number is [phone]  Date needed: as soon as possible    Has the patient been seen by the PCP for this problem? YES    Phone number Patient can be reached at:  Home number on file [phone] (home)    Best Time:  any    Can we leave a detailed message on this number?  YES    Call taken on 12/27/2021 at 12:19 PM by Sherly Restrepo

## 2021-12-29 ENCOUNTER — HOSPITAL ENCOUNTER (OUTPATIENT)
Dept: PHYSICAL THERAPY | Facility: CLINIC | Age: 36
Setting detail: THERAPIES SERIES
End: 2021-12-29
Attending: STUDENT IN AN ORGANIZED HEALTH CARE EDUCATION/TRAINING PROGRAM
Payer: MEDICARE

## 2021-12-29 DIAGNOSIS — S82.301A CLOSED FRACTURE OF DISTAL END OF RIGHT TIBIA, UNSPECIFIED FRACTURE MORPHOLOGY, INITIAL ENCOUNTER: ICD-10-CM

## 2021-12-29 PROCEDURE — 97116 GAIT TRAINING THERAPY: CPT | Mod: GP | Performed by: PHYSICAL THERAPIST

## 2021-12-29 PROCEDURE — 97161 PT EVAL LOW COMPLEX 20 MIN: CPT | Mod: GP | Performed by: PHYSICAL THERAPIST

## 2021-12-29 PROCEDURE — 97110 THERAPEUTIC EXERCISES: CPT | Mod: GP | Performed by: PHYSICAL THERAPIST

## 2021-12-29 NOTE — PROGRESS NOTES
12/29/21 1100   General Information   Type of Visit Initial OP Ortho PT Evaluation   Start of Care Date 12/29/21   Referring Physician Saleem Painter MD   Patient/Family Goals Statement walking, stairs   Orders Evaluate and Treat   Orders Comment Patient with history of intelectual disability, s/p right tibia nailing for fracture on 12/20/21. Also needs walker   Date of Order 12/21/21   Certification Required? Yes   Medical Diagnosis Closed fracture of distal end of right tibia, unspecified fracture morphology, initial encounter   Surgical/Medical history reviewed Yes   Precautions/Limitations fall precautions   Weight-Bearing Status - RLE toe touch weight-bearing   General Information Comments Per chart review:  Isaiah Dominguez is a 36 year old male with a history of intellectual disability who resides in a group home and sustained a twisting injury to his right lower extremity on 12/19/2021.  He was seen in emergency room and found to have a spiral distal third tibia shaft fracture.  Surgery R tibia nailing 12/20/21.  He will use crutches and be toe-touch weightbearing in the splint for the next 2 weeks.  Plan to transition to a boot at the 2-week evy and maintain toe-touch weightbearing for 6 weeks.   Body Part(s)   Body Part(s) Knee;Ankle/Foot   Presentation and Etiology   Pertinent history of current problem (include personal factors and/or comorbidities that impact the POC) Pt (preferred name NATALIE) here with mother, notes that current group home cannot accommodate due to can't do stairs right now so will be transferred to group home in [place] and has future scheduled physical therapy there. Using 2WW at home, notes hopping with good leg.  Follow up with surgeon on 1/5/21. Bandage to stay on until that visit. No pain. Got a new walker and wondering how to fit it/use it.  TJ anxious that he will not be able to return to current group home, or walk/do stairs again.    Impairments A. Pain;B.  Decreased WB tolerance;G. Impaired balance;H. Impaired gait;D. Decreased ROM;E. Decreased flexibility;F. Decreased strength and endurance   Functional Limitations perform activities of daily living;perform required work activities;perform desired leisure / sports activities   Symptom Location R LE, ankle   How/Where did it occur With a fall;At home  (surgery 12/20/21)   Onset date of current episode/exacerbation 12/19/21   Chronicity New   Pain rating (0-10 point scale) Denies pain   Prior Level of Function   Prior Level of Function-Mobility Independent   Functional Level Prior Comment Resides in group home   Current Level of Function   Living environment Group home   Home/community accessibility Currently staying w/ mother with 2 KATIE, transitioning to new group home and unsure of what that set up will be, current group home requires flight of stairs. New one will be accesible.    Current equipment-Gait/Locomotion Front wheeled walker   Fall Risk Screen   Fall screen comments TUG not assessed today, pt is fall risk due to history of falls, and current WB status of RLE resulting in impaired mobility, balance, gait   Ankle/Foot Objective Findings   Side (if bilateral, select both right and left) Right   Observation Pt arrives to session in manual WC, assisted by mother with WC navigation. RLE w/ ACE bandage to mid thigh. Bandage unraveling distally.    Integumentary Visualized distal toes and proximal to bandage and skin appears to be well circulated, wnl to touch    Gait/Locomotion Transfers sit to stand wih heavy reliance on B UE for assist, instability in standing without holding on. Ambulation w/ 2 WW, demonstrating NWB status on RLE    Balance/ Proprioception (Single Leg Stance) unable (WB status)   Ankle/Foot ROM Comment AROM of toes demonstrated. AROM ankle not assessed due to splinting/MD diagnosis   Ankle/Foot Strength Comments not assessed secondary to MD diagnosis   Knee Objective Findings   Side (if  bilateral, select both right and left) Right   Knee/Hip Strength Comments Able to complete full knee extension and flexion against gravity. Hip flexion 4/5   Right Knee Extension AROM lacking 4* in supine   Right Knee Flexion AROM 88   Planned Therapy Interventions   Planned Therapy Interventions stretching;strengthening;ROM;neuromuscular re-education;manual therapy;joint mobilization;gait training;balance training   Clinical Impression   Criteria for Skilled Therapeutic Interventions Met yes, treatment indicated   PT Diagnosis R ankle pain and weakness s/p tibia fracture    Influenced by the following impairments WB restrictions, limited joint ROM, muscle strength, muscle tension, impaired gait and balance, impaired mobility   Functional limitations due to impairments lack of RLE mobility resulting in impaired funcitonal mobility, difficulty with transfers, walking, stairs, ADLs, risk of falls   Clinical Presentation Evolving/Changing   Clinical Presentation Rationale clinical judgement, comorbidities, post surgical   Clinical Decision Making (Complexity) Moderate complexity   Therapy Frequency 2 times/Week   Predicted Duration of Therapy Intervention (days/wks) 1-2 x/ week x 12 weeks, frequency per week as appropriate given WB status   Risk & Benefits of therapy have been explained Yes   Patient, Family & other staff in agreement with plan of care Yes   Clinical Impression Comments Pt presents with the above impairements after right tibial nailing on 12/20/21. At this time given post surgical status, eval somewhat limited however do anticipate given pt's cognitive status and additional psychosocial factors including moving to new group home may impact recovery. Overall, pt receptive to physical therapy and motivated to participate and improve however will require ongoing supervision for compliance with weight bearing restrictions. He will benefit from skllled outpatient physical therapy intervention to address  above impairements and progress per MD protocol/instructions to return to desired level of funciton.    Education Assessment   Preferred Learning Style Demonstration;Pictures/video   Barriers to Learning No barriers   ORTHO GOALS   PT Ortho Eval Goals 1;2;3;4   Ortho Goal 1   Goal Identifier 1   Goal Description Pt will report clinically significant improved post operative function and decreased impact on quality of life as evidenced by LEFS score of > 40    Target Date 03/23/22   Ortho Goal 2   Goal Identifier 2   Goal Description Pt will demonstrate ambulation x 100' with least restrictive assistive device for improved household mobility   Target Date 02/09/22   Ortho Goal 3   Goal Identifier 3   Goal Description Pt will demonstrate ascending and descending a flight of stairs mod I for return to prior living environment.    Target Date 03/09/22   Ortho Goal 4   Goal Identifier 4   Goal Description Patient will complete a 6 minute walk without use of an assistive device, without pain and without compensatory gait strategies as evidence of improved lower quarter strength, flexibility, endurance and stability for improved quality of life.   Target Date 03/23/22   Total Evaluation Time   PT Eval, Low Complexity Minutes (56014) 20   Therapy Certification   Certification date from 12/29/21   Certification date to 03/23/22   Medical Diagnosis Closed fracture of distal end of right tibia, unspecified fracture morphology, initial encounter                                                                              [facility]    OUTPATIENT PHYSICAL THERAPY ORTHOPEDIC EVALUATION  PLAN OF TREATMENT FOR OUTPATIENT REHABILITATION  (COMPLETE FOR INITIAL CLAIMS ONLY)  Patient's Last Name, First Name, M.I.  YOB: 1985  Isaiah Dominguez Jr    Provider s Name:  [facility]   Medical Record No.  [id number]   Start of Care Date:  12/29/21   Onset Date:  12/19/21    Type:     _X__PT   ___OT   ___SLP Medical Diagnosis:  Closed fracture of distal end of right tibia, unspecified fracture morphology, initial encounter     PT Diagnosis:  R ankle pain and weakness s/p tibia fracture    Visits from SOC:  1      _________________________________________________________________________________  Plan of Treatment/Functional Goals:  stretching,strengthening,ROM,neuromuscular re-education,manual therapy,joint mobilization,gait training,balance training           Goals  Goal Identifier: 1  Goal Description: Pt will report clinically significant improved post operative function and decreased impact on quality of life as evidenced by LEFS score of > 40   Target Date: 03/23/22    Goal Identifier: 2  Goal Description: Pt will demonstrate ambulation x 100' with least restrictive assistive device for improved household mobility  Target Date: 02/09/22    Goal Identifier: 3  Goal Description: Pt will demonstrate ascending and descending a flight of stairs mod I for return to prior living environment.   Target Date: 03/09/22    Goal Identifier: 4  Goal Description: Patient will complete a 6 minute walk without use of an assistive device, without pain and without compensatory gait strategies as evidence of improved lower quarter strength, flexibility, endurance and stability for improved quality of life.  Target Date: 03/23/22      Therapy Frequency:  2 times/Week  Predicted Duration of Therapy Intervention:  1-2 x/ week x 12 weeks, frequency per week as appropriate given WB status    Annie Villalobos PT                 I CERTIFY THE NEED FOR THESE SERVICES FURNISHED UNDER        THIS PLAN OF TREATMENT AND WHILE UNDER MY CARE     (Physician co-signature of this document indicates review and certification of the therapy plan).                       Certification Date From:  12/29/21   Certification Date To:  03/23/22    Referring Provider:  Saleem Painter MD    Initial  Assessment        See Epic Evaluation Start of Care Date: 12/29/21

## 2022-01-12 ENCOUNTER — THERAPY VISIT (OUTPATIENT)
Dept: PHYSICAL THERAPY | Facility: CLINIC | Age: 37
End: 2022-01-12
Payer: MEDICARE

## 2022-01-12 DIAGNOSIS — S82.301A CLOSED FRACTURE OF DISTAL END OF RIGHT TIBIA, UNSPECIFIED FRACTURE MORPHOLOGY, INITIAL ENCOUNTER: Primary | ICD-10-CM

## 2022-01-12 DIAGNOSIS — S82.831A CLOSED FRACTURE OF PROXIMAL END OF RIGHT FIBULA, UNSPECIFIED FRACTURE MORPHOLOGY, INITIAL ENCOUNTER: ICD-10-CM

## 2022-01-12 PROCEDURE — 97110 THERAPEUTIC EXERCISES: CPT | Mod: GP | Performed by: PHYSICAL THERAPIST

## 2022-01-14 ENCOUNTER — TELEPHONE (OUTPATIENT)
Dept: PHYSICAL THERAPY | Facility: CLINIC | Age: 37
End: 2022-01-14

## 2022-01-14 ENCOUNTER — THERAPY VISIT (OUTPATIENT)
Dept: PHYSICAL THERAPY | Facility: CLINIC | Age: 37
End: 2022-01-14
Payer: MEDICARE

## 2022-01-14 DIAGNOSIS — S82.301A CLOSED FRACTURE OF DISTAL END OF RIGHT TIBIA, UNSPECIFIED FRACTURE MORPHOLOGY, INITIAL ENCOUNTER: Primary | ICD-10-CM

## 2022-01-14 PROCEDURE — 97110 THERAPEUTIC EXERCISES: CPT | Mod: GP | Performed by: PHYSICAL THERAPIST

## 2022-01-14 NOTE — TELEPHONE ENCOUNTER
Phone call with patient's mother Jeb Parham called to check on instructions provided to Isaiah regarding walker use. Clarified that Isaiah is walking correctly in walker as previously instructed by previous PT with PWB status in CAM boot, but that he should attempt to walk with the walker closer to him for increased safety. Also discussed that WB status will be updated at next MD visit.

## 2022-01-17 ENCOUNTER — TELEPHONE (OUTPATIENT)
Dept: FAMILY MEDICINE | Facility: CLINIC | Age: 37
End: 2022-01-17
Payer: MEDICARE

## 2022-01-17 NOTE — TELEPHONE ENCOUNTER
Reason for Call:  Request for immunization record.      Detailed comments: Mother called and requested Covid vaccination record.   Copy mailed.      Phone Number Patient can be reached at: [phone]      Call taken on 1/17/2022 at 8:07 AM by Ximena Reina

## 2022-01-19 ENCOUNTER — THERAPY VISIT (OUTPATIENT)
Dept: PHYSICAL THERAPY | Facility: CLINIC | Age: 37
End: 2022-01-19
Payer: MEDICARE

## 2022-01-19 DIAGNOSIS — M25.571 PAIN IN JOINT, ANKLE AND FOOT, RIGHT: Primary | ICD-10-CM

## 2022-01-19 PROCEDURE — 97110 THERAPEUTIC EXERCISES: CPT | Mod: GP | Performed by: PHYSICAL THERAPIST

## 2022-01-26 ENCOUNTER — THERAPY VISIT (OUTPATIENT)
Dept: PHYSICAL THERAPY | Facility: CLINIC | Age: 37
End: 2022-01-26
Payer: MEDICARE

## 2022-01-26 DIAGNOSIS — M25.571 PAIN IN JOINT, ANKLE AND FOOT, RIGHT: ICD-10-CM

## 2022-01-26 PROCEDURE — 97110 THERAPEUTIC EXERCISES: CPT | Mod: GP | Performed by: PHYSICAL THERAPIST

## 2022-02-01 ENCOUNTER — THERAPY VISIT (OUTPATIENT)
Dept: PHYSICAL THERAPY | Facility: CLINIC | Age: 37
End: 2022-02-01
Payer: MEDICARE

## 2022-02-01 DIAGNOSIS — M25.571 PAIN IN JOINT, ANKLE AND FOOT, RIGHT: ICD-10-CM

## 2022-02-01 PROCEDURE — 97110 THERAPEUTIC EXERCISES: CPT | Mod: GP | Performed by: PHYSICAL THERAPIST

## 2022-02-01 NOTE — PROGRESS NOTES
Subjective:  HPI  Physical Exam                    Objective:  System    Physical Exam    General     ROS    Assessment/Plan:    PROGRESS  REPORT    SUBJECTIVE  Pt has no pain, but struggles with mobility and strength with his R ankle/foot.  Pt has been trying to follow restrictions and using his boot, but is eager to get rid of it.     Current pain level is 0/10  .     Previous pain level was  0/10 Initial Pain level: 0/10.   Changes in function:  Yes (See Goal flowsheet attached for changes in current functional level)  Adverse reaction to treatment or activity: None    OBJECTIVE  Pt lacks active DF and eversion.  R ankle AROM: PF 20, DF no active DF, in 10, ev no active EV.  R ankle PROM: PF 30, DF 5 from neutral, ev 10, inv 20.      ASSESSMENT/PLAN  Updated problem list and treatment plan: Diagnosis 1:  S/p R ankle ORIF      Decreased ROM/flexibility - manual therapy and therapeutic exercise  Decreased strength - therapeutic exercise and therapeutic activities  Impaired gait - gait training  Impaired muscle performance - neuro re-education  Decreased function - therapeutic activities  STG/LTGs have been met or progress has been made towards goals:  Yes (See Goal flow sheet completed today.)  Assessment of Progress: The patient's condition is improving.  Self Management Plans:  Patient has been instructed in a home treatment program.  Patient  has been instructed in self management of symptoms.  I have re-evaluated this patient and find that the nature, scope, duration and intensity of the therapy is appropriate for the medical condition of the patient.  Isaiah Ferguson continues to require the following intervention to meet STG and LTG's:  PT    Recommendations:  This patient would benefit from continued therapy. Possibly further evaluation to evaluate R ankle DF and eversion lack of muscle activation.    Frequency:  1 X week, once daily  Duration:  for 6 weeks    Please refer to the daily flowsheet for treatment  today, total treatment time and time spent performing 1:1 timed codes.

## 2022-02-02 ENCOUNTER — TRANSFERRED RECORDS (OUTPATIENT)
Dept: HEALTH INFORMATION MANAGEMENT | Facility: CLINIC | Age: 37
End: 2022-02-02
Payer: MEDICARE

## 2022-02-08 ENCOUNTER — THERAPY VISIT (OUTPATIENT)
Dept: PHYSICAL THERAPY | Facility: CLINIC | Age: 37
End: 2022-02-08
Payer: MEDICARE

## 2022-02-08 DIAGNOSIS — M25.571 PAIN IN JOINT, ANKLE AND FOOT, RIGHT: ICD-10-CM

## 2022-02-08 PROCEDURE — 97110 THERAPEUTIC EXERCISES: CPT | Mod: GP | Performed by: PHYSICAL THERAPIST

## 2022-02-16 ENCOUNTER — THERAPY VISIT (OUTPATIENT)
Dept: PHYSICAL THERAPY | Facility: CLINIC | Age: 37
End: 2022-02-16
Payer: MEDICARE

## 2022-02-16 DIAGNOSIS — M25.571 PAIN IN JOINT, ANKLE AND FOOT, RIGHT: ICD-10-CM

## 2022-02-16 PROCEDURE — 97110 THERAPEUTIC EXERCISES: CPT | Mod: GP | Performed by: PHYSICAL THERAPIST

## 2022-02-16 PROCEDURE — 97140 MANUAL THERAPY 1/> REGIONS: CPT | Mod: GP | Performed by: PHYSICAL THERAPIST

## 2022-02-23 ENCOUNTER — THERAPY VISIT (OUTPATIENT)
Dept: PHYSICAL THERAPY | Facility: CLINIC | Age: 37
End: 2022-02-23
Payer: MEDICARE

## 2022-02-23 DIAGNOSIS — M25.571 PAIN IN JOINT, ANKLE AND FOOT, RIGHT: ICD-10-CM

## 2022-02-23 PROCEDURE — 97110 THERAPEUTIC EXERCISES: CPT | Mod: GP | Performed by: PHYSICAL THERAPIST

## 2022-02-23 PROCEDURE — 97112 NEUROMUSCULAR REEDUCATION: CPT | Mod: GP | Performed by: PHYSICAL THERAPIST

## 2022-03-02 ENCOUNTER — TRANSFERRED RECORDS (OUTPATIENT)
Dept: HEALTH INFORMATION MANAGEMENT | Facility: CLINIC | Age: 37
End: 2022-03-02
Payer: MEDICARE

## 2022-03-03 ENCOUNTER — TELEPHONE (OUTPATIENT)
Dept: PHYSICAL THERAPY | Facility: CLINIC | Age: 37
End: 2022-03-03
Payer: MEDICARE

## 2022-03-03 NOTE — TELEPHONE ENCOUNTER
Saleem from [facility] called wanting to speak to Ramirez Horan regarding Isaiah's progress. Please call him on his cell at [phone]. Kylie Richards RN on 3/3/2022 at 9:12 AM

## 2022-03-16 ENCOUNTER — THERAPY VISIT (OUTPATIENT)
Dept: PHYSICAL THERAPY | Facility: CLINIC | Age: 37
End: 2022-03-16
Payer: MEDICARE

## 2022-03-16 DIAGNOSIS — M25.571 PAIN IN JOINT, ANKLE AND FOOT, RIGHT: ICD-10-CM

## 2022-03-16 PROCEDURE — 97110 THERAPEUTIC EXERCISES: CPT | Mod: GP | Performed by: PHYSICAL THERAPIST

## 2022-04-06 ENCOUNTER — TRANSFERRED RECORDS (OUTPATIENT)
Dept: HEALTH INFORMATION MANAGEMENT | Facility: CLINIC | Age: 37
End: 2022-04-06
Payer: MEDICARE

## 2022-04-27 DIAGNOSIS — I10 BENIGN ESSENTIAL HYPERTENSION: ICD-10-CM

## 2022-04-28 RX ORDER — LISINOPRIL 20 MG/1
TABLET ORAL
Qty: 30 TABLET | Refills: 0 | Status: SHIPPED | OUTPATIENT
Start: 2022-04-28 | End: 2022-05-19

## 2022-05-09 ENCOUNTER — HOSPITAL ENCOUNTER (OUTPATIENT)
Dept: MRI IMAGING | Facility: CLINIC | Age: 37
Discharge: HOME | End: 2022-05-09
Attending: STUDENT IN AN ORGANIZED HEALTH CARE EDUCATION/TRAINING PROGRAM | Admitting: STUDENT IN AN ORGANIZED HEALTH CARE EDUCATION/TRAINING PROGRAM
Payer: MEDICARE

## 2022-05-09 DIAGNOSIS — L70.9 ACNE: ICD-10-CM

## 2022-05-09 DIAGNOSIS — S82.209A TIBIA FRACTURE: ICD-10-CM

## 2022-05-09 PROCEDURE — 73721 MRI JNT OF LWR EXTRE W/O DYE: CPT | Mod: RT

## 2022-05-09 PROCEDURE — 73721 MRI JNT OF LWR EXTRE W/O DYE: CPT | Mod: 26 | Performed by: RADIOLOGY

## 2022-05-10 RX ORDER — METHOCARBAMOL 750 MG/1
TABLET ORAL
Qty: 42.5 G | Refills: 0 | Status: SHIPPED | OUTPATIENT
Start: 2022-05-10 | End: 2022-06-27

## 2022-05-11 NOTE — PROGRESS NOTES
"Discharge Note    Progress reporting period is from last progress note on 05/11/22 to Mar 16, 2022.    Isaiah Ferguson failed to follow up and current status is unknown.  Please see information below for last relevant information on current status.  Patient seen for 8 visits.    SUBJECTIVE  Subjective changes noted by patient:  Pt feels that he is doing well, improved function and no pain.   .  Current pain level is  .     Previous pain level was  0/10.   Changes in function:  Yes (See Goal flowsheet attached for changes in current functional level)  Adverse reaction to treatment or activity: None    OBJECTIVE  Changes noted in objective findings: Gait: pt is able to ambulate without AD, has slight tendency to catch toe a bit, but wearing the AFO helps.  Pt can ascend/descend 6\" step without assistance.  R ankle MMT: DF 0, ev 1+     ASSESSMENT/PLAN  Diagnosis: s/p R distal ribia fx with ORIF   STG/LTGs have been met or progress has been made towards goals:  Yes, please see goal flowsheet for most current information  Assessment of Progress: current status is unknown.    Last current status: Pt is progressing as expected   Self Management Plans:  HEP  I have re-evaluated this patient and find that the nature, scope, duration and intensity of the therapy is appropriate for the medical condition of the patient.  Isaiah Ferguson continues to require the following intervention to meet STG and LTG's:  HEP.    Recommendations:  Discharge with current home program.  Patient to follow up with MD as needed.    Please refer to the daily flowsheet for treatment today, total treatment time and time spent performing 1:1 timed codes.        "

## 2022-05-16 ENCOUNTER — TRANSFERRED RECORDS (OUTPATIENT)
Dept: HEALTH INFORMATION MANAGEMENT | Facility: CLINIC | Age: 37
End: 2022-05-16
Payer: MEDICARE

## 2022-05-17 DIAGNOSIS — I10 BENIGN ESSENTIAL HYPERTENSION: ICD-10-CM

## 2022-05-19 RX ORDER — LISINOPRIL 20 MG/1
TABLET ORAL
Qty: 31 TABLET | Refills: 0 | Status: SHIPPED | OUTPATIENT
Start: 2022-05-19 | End: 2022-06-27

## 2022-06-27 ENCOUNTER — OFFICE VISIT (OUTPATIENT)
Dept: FAMILY MEDICINE | Facility: CLINIC | Age: 37
End: 2022-06-27
Payer: MEDICARE

## 2022-06-27 VITALS
SYSTOLIC BLOOD PRESSURE: 126 MMHG | WEIGHT: 175 LBS | BODY MASS INDEX: 28.12 KG/M2 | RESPIRATION RATE: 18 BRPM | HEART RATE: 80 BPM | TEMPERATURE: 98 F | HEIGHT: 66 IN | DIASTOLIC BLOOD PRESSURE: 70 MMHG

## 2022-06-27 DIAGNOSIS — R79.9 ABNORMAL FINDING OF BLOOD CHEMISTRY, UNSPECIFIED: ICD-10-CM

## 2022-06-27 DIAGNOSIS — I10 BENIGN ESSENTIAL HYPERTENSION: ICD-10-CM

## 2022-06-27 LAB
ALBUMIN SERPL-MCNC: 4 G/DL (ref 3.4–5)
ALP SERPL-CCNC: 126 U/L (ref 40–150)
ALT SERPL W P-5'-P-CCNC: 47 U/L (ref 0–70)
ANION GAP SERPL CALCULATED.3IONS-SCNC: 2 MMOL/L (ref 3–14)
AST SERPL W P-5'-P-CCNC: 22 U/L (ref 0–45)
BILIRUB SERPL-MCNC: 0.3 MG/DL (ref 0.2–1.3)
BUN SERPL-MCNC: 11 MG/DL (ref 7–30)
CALCIUM SERPL-MCNC: 9 MG/DL (ref 8.5–10.1)
CHLORIDE BLD-SCNC: 109 MMOL/L (ref 94–109)
CO2 SERPL-SCNC: 27 MMOL/L (ref 20–32)
CREAT SERPL-MCNC: 0.8 MG/DL (ref 0.66–1.25)
ERYTHROCYTE [DISTWIDTH] IN BLOOD BY AUTOMATED COUNT: 14.5 % (ref 10–15)
GFR SERPL CREATININE-BSD FRML MDRD: >90 ML/MIN/1.73M2
GLUCOSE BLD-MCNC: 106 MG/DL (ref 70–99)
HBA1C MFR BLD: 5.7 % (ref 0–5.6)
HCT VFR BLD AUTO: 42.5 % (ref 40–53)
HGB BLD-MCNC: 13.8 G/DL (ref 13.3–17.7)
MCH RBC QN AUTO: 28.9 PG (ref 26.5–33)
MCHC RBC AUTO-ENTMCNC: 32.5 G/DL (ref 31.5–36.5)
MCV RBC AUTO: 89 FL (ref 78–100)
PLATELET # BLD AUTO: 341 10E3/UL (ref 150–450)
POTASSIUM BLD-SCNC: 4.7 MMOL/L (ref 3.4–5.3)
PROT SERPL-MCNC: 8.4 G/DL (ref 6.8–8.8)
RBC # BLD AUTO: 4.78 10E6/UL (ref 4.4–5.9)
SODIUM SERPL-SCNC: 138 MMOL/L (ref 133–144)
WBC # BLD AUTO: 7.3 10E3/UL (ref 4–11)

## 2022-06-27 PROCEDURE — 83036 HEMOGLOBIN GLYCOSYLATED A1C: CPT | Performed by: NURSE PRACTITIONER

## 2022-06-27 PROCEDURE — 36415 COLL VENOUS BLD VENIPUNCTURE: CPT | Performed by: NURSE PRACTITIONER

## 2022-06-27 PROCEDURE — 99213 OFFICE O/P EST LOW 20 MIN: CPT | Performed by: NURSE PRACTITIONER

## 2022-06-27 PROCEDURE — 85027 COMPLETE CBC AUTOMATED: CPT | Performed by: NURSE PRACTITIONER

## 2022-06-27 PROCEDURE — 80053 COMPREHEN METABOLIC PANEL: CPT | Performed by: NURSE PRACTITIONER

## 2022-06-27 RX ORDER — LISINOPRIL 20 MG/1
TABLET ORAL
Qty: 90 TABLET | Refills: 3 | Status: SHIPPED | OUTPATIENT
Start: 2022-06-27 | End: 2023-07-13

## 2022-06-27 RX ORDER — BENZOYL PEROXIDE 5 G/100G
GEL TOPICAL DAILY
COMMUNITY
End: 2022-07-20

## 2022-06-27 RX ORDER — ATOMOXETINE 25 MG/1
25 CAPSULE ORAL DAILY
COMMUNITY

## 2022-06-27 RX ORDER — QUETIAPINE FUMARATE 25 MG/1
25 TABLET, FILM COATED ORAL 2 TIMES DAILY
COMMUNITY
End: 2023-02-15

## 2022-06-27 ASSESSMENT — PAIN SCALES - GENERAL: PAINLEVEL: NO PAIN (0)

## 2022-06-27 NOTE — RESULT ENCOUNTER NOTE
Please Notify Isaiah Ferguson  of test results *\hemoglobin A1c is mildly elevated at 5.7 which does indicate prediabetes no intervention at this time but just require recommend checking every 6 months to a year.  CBC was within normal limits with normal hemoglobin levels and comprehensive panel was within normal limits with normal kidney and liver functions.    Mercedes Donovan CNP

## 2022-06-27 NOTE — LETTER
June 27, 2022      Isaiah Dominguez  [address] [address] [facility] [place] [id number]        Dear ,    We are writing to inform you of your test results.    hemoglobin A1c is mildly elevated at 5.7 which does indicate prediabetes no intervention at this time but just require recommend checking every 6 months to a year.   CBC was within normal limits with normal hemoglobin levels and comprehensive panel was within normal limits with normal kidney and liver functions.       Resulted Orders   Comprehensive metabolic panel (BMP + Alb, Alk Phos, ALT, AST, Total. Bili, TP)   Result Value Ref Range    Sodium 138 133 - 144 mmol/L    Potassium 4.7 3.4 - 5.3 mmol/L    Chloride 109 94 - 109 mmol/L    Carbon Dioxide (CO2) 27 20 - 32 mmol/L    Anion Gap 2 (L) 3 - 14 mmol/L    Urea Nitrogen 11 7 - 30 mg/dL    Creatinine 0.80 0.66 - 1.25 mg/dL    Calcium 9.0 8.5 - 10.1 mg/dL    Glucose 106 (H) 70 - 99 mg/dL    Alkaline Phosphatase 126 40 - 150 U/L    AST 22 0 - 45 U/L    ALT 47 0 - 70 U/L    Protein Total 8.4 6.8 - 8.8 g/dL    Albumin 4.0 3.4 - 5.0 g/dL    Bilirubin Total 0.3 0.2 - 1.3 mg/dL    GFR Estimate >90 >60 mL/min/1.73m2      Comment:      Effective December 21, 2021 eGFRcr in adults is calculated using the 2021 CKD-EPI creatinine equation which includes age and gender (Nohelia clemente al., [facility], DOI: 10.1056/IZWUzm5527596)   CBC with platelets   Result Value Ref Range    WBC Count 7.3 4.0 - 11.0 10e3/uL    RBC Count 4.78 4.40 - 5.90 10e6/uL    Hemoglobin 13.8 13.3 - 17.7 g/dL    Hematocrit 42.5 40.0 - 53.0 %    MCV 89 78 - 100 fL    MCH 28.9 26.5 - 33.0 pg    MCHC 32.5 31.5 - 36.5 g/dL    RDW 14.5 10.0 - 15.0 %    Platelet Count 341 150 - 450 10e3/uL   Hemoglobin A1c   Result Value Ref Range    Hemoglobin A1C 5.7 (H) 0.0 - 5.6 %      Comment:      Normal <5.7%   Prediabetes 5.7-6.4%    Diabetes 6.5% or higher     Note: Adopted from ADA consensus guidelines.       If you have any questions or concerns, please call the clinic  at the number listed above.       Sincerely,      MARYCRUZ Calhoun CNP

## 2022-06-27 NOTE — PROGRESS NOTES
"  Assessment & Plan     (I10) Benign essential hypertension  Comment:  Controlled no change in treatment plan  Plan: lisinopril (ZESTRIL) 20 MG tablet,         Comprehensive metabolic panel (BMP + Alb, Alk         Phos, ALT, AST, Total. Bili, TP), CBC with         platelets, Hemoglobin A1c      (R79.9) Abnormal finding of blood chemistry, unspecified   Comment: Screen diabetes   Plan: Hemoglobin A1c      :[id number]}     BMI:   Estimated body mass index is 28.25 kg/m  as calculated from the following:    Height as of this encounter: 1.676 m (5' 6\").    Weight as of this encounter: 79.4 kg (175 lb).       See Patient Instructions    No follow-ups on file.    Mercedes Donovan, APRN CNP  [facility]    Rachel Colon Jr is a 36 year old accompanied by his caregiver ., presenting for the following health issues:  No chief complaint on file.      History of Present Illness       Hypertension: He presents for follow up of hypertension.  He does check blood pressure  regularly outside of the clinic. Outpatient blood pressures have not been over 140/90. He follows a low salt diet.         Review of Systems   Constitutional, HEENT, cardiovascular, pulmonary, gi and gu systems are negative, except as otherwise noted.      Objective    /70 (BP Location: Right arm, Cuff Size: Adult Large)   Pulse 80   Temp 98  F (36.7  C) (Tympanic)   Resp 18   Ht 1.676 m (5' 6\")   Wt 79.4 kg (175 lb)   BMI 28.25 kg/m    There is no height or weight on file to calculate BMI.  Physical Exam   GENERAL: healthy, alert and no distress  EYES: Eyes grossly normal to inspection, PERRL and conjunctivae and sclerae normal  HENT: ear canals and TM's normal, nose and mouth without ulcers or lesions  NECK: no adenopathy, no asymmetry, masses, or scars and thyroid normal to palpation  RESP: lungs clear to auscultation - no rales, rhonchi or wheezes  CV: regular rate and rhythm, normal S1 S2, no S3 or S4, no murmur, " click or rub, no peripheral edema and peripheral pulses strong  MS: no gross musculoskeletal defects noted, no edema  SKIN: no suspicious lesions or rashes  NEURO: Normal strength and tone, mentation intact and speech normal  PSYCH: mentation appears normal, affect normal/bright    No results found for any visits on 06/27/22.          .  ..

## 2022-06-30 ENCOUNTER — TRANSFERRED RECORDS (OUTPATIENT)
Dept: FAMILY MEDICINE | Facility: CLINIC | Age: 37
End: 2022-06-30

## 2022-07-19 DIAGNOSIS — L70.0 ACNE VULGARIS: Primary | ICD-10-CM

## 2022-07-20 RX ORDER — BENZOYL PEROXIDE 5 G/100G
GEL TOPICAL DAILY
Qty: 30 G | Refills: 0 | Status: SHIPPED | OUTPATIENT
Start: 2022-07-20 | End: 2022-12-19

## 2022-07-20 NOTE — TELEPHONE ENCOUNTER
"Routing refill request to provider for review/approval because:  Medication is reported/historical    Requested Prescriptions   Pending Prescriptions Disp Refills     benzoyl peroxide 5 % topical gel       Sig: Apply topically daily       Topical Acne Medications Protocol Passed - 7/19/2022  9:10 AM        Passed - Patient is 12 years of age or older        Passed - Recent (12 mo) or future (30 days) visit within the authorizing provider's specialty     Patient has had an office visit with the authorizing provider or a provider within the authorizing providers department within the previous 12 mos or has a future within next 30 days. See \"Patient Info\" tab in inbasket, or \"Choose Columns\" in Meds & Orders section of the refill encounter.              Passed - Medication is active on med list                   "

## 2022-08-11 ENCOUNTER — OFFICE VISIT (OUTPATIENT)
Dept: FAMILY MEDICINE | Facility: CLINIC | Age: 37
End: 2022-08-11
Payer: MEDICARE

## 2022-08-11 VITALS
TEMPERATURE: 97.3 F | HEIGHT: 66 IN | HEART RATE: 85 BPM | DIASTOLIC BLOOD PRESSURE: 72 MMHG | SYSTOLIC BLOOD PRESSURE: 130 MMHG | RESPIRATION RATE: 14 BRPM | WEIGHT: 176 LBS | BODY MASS INDEX: 28.28 KG/M2 | OXYGEN SATURATION: 99 %

## 2022-08-11 DIAGNOSIS — Z00.00 ENCOUNTER FOR MEDICARE ANNUAL WELLNESS EXAM: Primary | ICD-10-CM

## 2022-08-11 DIAGNOSIS — B35.3 TINEA PEDIS OF BOTH FEET: ICD-10-CM

## 2022-08-11 PROCEDURE — 99213 OFFICE O/P EST LOW 20 MIN: CPT | Mod: 25 | Performed by: NURSE PRACTITIONER

## 2022-08-11 PROCEDURE — 99395 PREV VISIT EST AGE 18-39: CPT | Performed by: NURSE PRACTITIONER

## 2022-08-11 RX ORDER — CICLOPIROX 80 MG/ML
SOLUTION TOPICAL
Qty: 6.6 ML | Refills: 1 | Status: SHIPPED | OUTPATIENT
Start: 2022-08-11 | End: 2022-09-13

## 2022-08-11 ASSESSMENT — PAIN SCALES - GENERAL: PAINLEVEL: NO PAIN (0)

## 2022-08-11 NOTE — PROGRESS NOTES
"  Assessment & Plan     (Z00.00) Encounter for Medicare annual wellness exam  (primary encounter diagnosis)  Comment:   Plan:     (B35.3) Tinea pedis of both feet  Comment: We will attempt Penlac did review treatment for fungal nails if not improving patient will consider follow-up with podiatry  Plan: ciclopirox (PENLAC) 8 % external solution        Return in about 53 weeks (around 8/17/2023) for Annual Wellness Visit.    MARYCRUZ Calhoun [facility]    Subjective   Isaiah Ferguson is a 37 year old accompanied by his Caregiver , presenting for the following health issues:  Fungal Infection and Physical      Healthy Habits:    Taking medications regularly:  0    PHQ-2 Total Score:  History of Present Illness       Reason for visit:  Feet and nail health    He eats 2-3 servings of fruits and vegetables daily.He consumes 2 sweetened beverage(s) daily.He exercises with enough effort to increase his heart rate 20 to 29 minutes per day.  He exercises with enough effort to increase his heart rate 4 days per week.   He is taking medications regularly.        Annual Wellness Visit  Poss toe nails fungus     Patient has been advised of split billing requirements and indicates understanding: Yes     Are you in the first 12 months of your Medicare Part B coverage?  No    Physical Health:    In general, how would you rate your overall physical health? good    Outside of work, how many days during the week do you exercise?2-3 days/week    Outside of work, approximately how many minutes a day do you exercise?15-30 minutes    If you drink alcohol do you typically have >3 drinks per day or >7 drinks per week? No    Do you usually eat at least 4 servings of fruit and vegetables a day, include whole grains & fiber and avoid regularly eating high fat or \"junk\" foods? Yes    Do you have any problems taking medications regularly? No    Do you have any side effects from medications? none    Needs assistance " "for the following daily activities: telephone use, transportation, shopping, preparing meals and housework    Which of the following safety concerns are present in your home?  none identified     Hearing impairment: No    In the past 6 months, have you been bothered by leaking of urine? no    Mental Health:    In general, how would you rate your overall mental or emotional health? good  PHQ-2 Score:      Do you feel safe in your environment? Yes    Have you ever done Advance Care Planning? (For example, a Health Directive, POLST, or a discussion with a medical provider or your loved ones about your wishes)? Yes, patient states has an Advance Care Planning document and will bring a copy to the clinic.    Fall risk:  Fallen 2 or more times in the past year?: No  Any fall with injury in the past year?: No  click delete button to remove this line now  Cognitive Screening: Not appropriate due to mental handicap    Do you have sleep apnea, excessive snoring or daytime drowsiness?: no    Current providers sharing in care for this patient include:   Patient Care Team:  No Ref-Primary, Physician as PCP - Mercedes Bell, MARYCRUZ ABEBE as Assigned PCP    Patient has been advised of split billing requirements and indicates understanding: Yes      Review of Systems   Constitutional, HEENT, cardiovascular, pulmonary, gi and gu systems are negative, except as otherwise noted.      Objective    /72   Pulse 85   Temp 97.3  F (36.3  C) (Tympanic)   Resp 14   Ht 1.676 m (5' 6\")   Wt 79.8 kg (176 lb)   SpO2 99%   BMI 28.41 kg/m    Body mass index is 28.41 kg/m .  Physical Exam   GENERAL: healthy, alert and no distress  EYES: Eyes grossly normal to inspection, PERRL and conjunctivae and sclerae normal  HENT: ear canals and TM's normal, nose and mouth without ulcers or lesions  NECK: no adenopathy, no asymmetry, masses, or scars and thyroid normal to palpation  RESP: lungs clear to auscultation - no rales, rhonchi or " wheezes  CV: regular rate and rhythm, normal S1 S2, no S3 or S4, no murmur, click or rub, no peripheral edema and peripheral pulses strong  ABDOMEN: soft, nontender, no hepatosplenomegaly, no masses and bowel sounds normal  MS: no gross musculoskeletal defects noted, no edema  SKIN: no suspicious lesions or rashes discoloration to the toes bilateral  NEURO: Normal strength and tone, mentation intact and speech normal  PSYCH: mentation appears normal, affect normal/bright    No results found for any visits on 08/11/22.

## 2022-08-11 NOTE — PATIENT INSTRUCTIONS
Patient Education   Personalized Prevention Plan  You are due for the preventive services outlined below.  Your care team is available to assist you in scheduling these services.  If you have already completed any of these items, please share that information with your care team to update in your medical record.  Health Maintenance Due   Topic Date Due     Annual Wellness Visit  02/25/2022

## 2022-08-15 ENCOUNTER — TELEPHONE (OUTPATIENT)
Dept: FAMILY MEDICINE | Facility: CLINIC | Age: 37
End: 2022-08-15

## 2022-08-15 NOTE — TELEPHONE ENCOUNTER
Forms/Letter Request    Type of form/letter: Group home paperwork. Annual standing order meds    Have you been seen for this request: N/A    Do we have the form/letter: Yes: Form placed on [email or web address] desk/folder for signature      How would you like the form/letter returned: Fax and mail back to patient    Sarah Spain  Patient

## 2022-09-12 DIAGNOSIS — B35.3 TINEA PEDIS OF BOTH FEET: ICD-10-CM

## 2022-09-13 RX ORDER — CICLOPIROX 80 MG/ML
SOLUTION TOPICAL
Qty: 6.6 ML | Refills: 11 | Status: SHIPPED | OUTPATIENT
Start: 2022-09-13

## 2022-09-13 NOTE — TELEPHONE ENCOUNTER
Requested Prescriptions   Pending Prescriptions Disp Refills    ciclopirox (PENLAC) 8 % external solution [Pharmacy Med Name: Ciclopirox 8 % Solution] 6.6 mL 11     Sig: APPLY TO ADJACENT SKIN & AFFECTED NAILS ONCE DAILY. REMOVE WITH ALCOHOL EVERY 7 DAYS & REPEAT **NON-COVERED MED**        There is no refill protocol information for this order

## 2022-10-13 ENCOUNTER — OFFICE VISIT (OUTPATIENT)
Dept: FAMILY MEDICINE | Facility: CLINIC | Age: 37
End: 2022-10-13
Payer: MEDICARE

## 2022-10-13 VITALS
DIASTOLIC BLOOD PRESSURE: 72 MMHG | TEMPERATURE: 97.6 F | HEIGHT: 66 IN | HEART RATE: 70 BPM | WEIGHT: 180 LBS | SYSTOLIC BLOOD PRESSURE: 120 MMHG | BODY MASS INDEX: 28.93 KG/M2 | RESPIRATION RATE: 18 BRPM

## 2022-10-13 DIAGNOSIS — B35.1 ONYCHOMYCOSIS: Primary | ICD-10-CM

## 2022-10-13 PROCEDURE — 90686 IIV4 VACC NO PRSV 0.5 ML IM: CPT | Performed by: FAMILY MEDICINE

## 2022-10-13 PROCEDURE — G0008 ADMIN INFLUENZA VIRUS VAC: HCPCS | Performed by: FAMILY MEDICINE

## 2022-10-13 PROCEDURE — 99213 OFFICE O/P EST LOW 20 MIN: CPT | Mod: 25 | Performed by: FAMILY MEDICINE

## 2022-10-13 ASSESSMENT — PAIN SCALES - GENERAL: PAINLEVEL: NO PAIN (0)

## 2022-10-13 NOTE — NURSING NOTE
"Chief Complaint   Patient presents with     Nail Problem     /72 (Cuff Size: Adult Regular)   Pulse 70   Temp 97.6  F (36.4  C) (Tympanic)   Resp 18   Ht 1.676 m (5' 6\")   Wt 81.6 kg (180 lb)   BMI 29.05 kg/m   Estimated body mass index is 29.05 kg/m  as calculated from the following:    Height as of this encounter: 1.676 m (5' 6\").    Weight as of this encounter: 81.6 kg (180 lb).  Patient presents to the clinic using No DME      Health Maintenance that is potentially due pending provider review:    Health Maintenance Due   Topic Date Due     COVID-19 Vaccine (4 - Booster for Moderna series) 02/09/2022     INFLUENZA VACCINE (1) 09/01/2022                "

## 2022-10-13 NOTE — PROGRESS NOTES
"  Assessment & Plan     Onychomycosis  Patient was started on Penlac for onychomycosis about 2 months ago, physical examination consistent with some possible improvement.  Recommended to continue Penlac and podiatry referral placed as well.  Will avoid oral antifungal considering comorbidities and side effects.  All questions answered.  - Orthopedic  Referral; Future      Higinio Denson MD  [facility]    Rachel Colon Jr is a 37 year old accompanied by his Group home , presenting for the following health issues:  Nail Problem      HPI     Concern - Toenail   Onset: Follow up- 2 months   Description: Both feet - all toenails   Intensity: moderate  Progression of Symptoms:  improving  Therapies tried and outcome: Ciclopirox solution         Review of Systems   Constitutional, HEENT, cardiovascular, pulmonary, gi and gu systems are negative, except as otherwise noted.      Objective    /72 (Cuff Size: Adult Regular)   Pulse 70   Temp 97.6  F (36.4  C) (Tympanic)   Resp 18   Ht 1.676 m (5' 6\")   Wt 81.6 kg (180 lb)   BMI 29.05 kg/m    Body mass index is 29.05 kg/m .  Physical Exam   GENERAL: alert and no distress  NECK: no adenopathy, no asymmetry, masses, or scars and thyroid normal to palpation  RESP: lungs clear to auscultation - no rales, rhonchi or wheezes  CV: regular rates and rhythm, normal S1 S2, no S3 or S4 and no murmur, click or rub  MS: onychomycosis involving multiple toes bilaterally, nail plate somewhat cleared proximally, heels dry, pedal pulses 3+                          "

## 2022-10-24 ENCOUNTER — ALLIED HEALTH/NURSE VISIT (OUTPATIENT)
Dept: FAMILY MEDICINE | Facility: CLINIC | Age: 37
End: 2022-10-24
Payer: MEDICARE

## 2022-10-24 DIAGNOSIS — Z23 HIGH PRIORITY FOR 2019-NCOV VACCINE: Primary | ICD-10-CM

## 2022-10-24 PROCEDURE — 91313 COVID-19,PF,MODERNA BIVALENT: CPT

## 2022-10-24 PROCEDURE — 99207 PR NO CHARGE NURSE ONLY: CPT

## 2022-10-24 PROCEDURE — 0134A COVID-19,PF,MODERNA BIVALENT: CPT

## 2022-11-04 NOTE — TELEPHONE ENCOUNTER
DIAGNOSIS: Onychomycosis   APPOINTMENT DATE: 11/16/2022   NOTES STATUS DETAILS   OFFICE NOTE from referring provider Internal 10/13/2022 - Higinio Denson MD - [facility]   MEDICATION LIST Internal    LABS     CBC/DIFF Internal 06/27/2022   PHOTOGRAPHS Media Tab    XRAYS (IMAGES & REPORTS) PACS Internal

## 2022-11-16 ENCOUNTER — PRE VISIT (OUTPATIENT)
Dept: ORTHOPEDICS | Facility: CLINIC | Age: 37
End: 2022-11-16

## 2022-12-13 ENCOUNTER — TELEPHONE (OUTPATIENT)
Dept: WOUND CARE | Facility: CLINIC | Age: 37
End: 2022-12-13

## 2022-12-13 ENCOUNTER — OFFICE VISIT (OUTPATIENT)
Dept: ORTHOPEDICS | Facility: CLINIC | Age: 37
End: 2022-12-13
Attending: FAMILY MEDICINE
Payer: MEDICARE

## 2022-12-13 DIAGNOSIS — B35.1 ONYCHOMYCOSIS: ICD-10-CM

## 2022-12-13 PROCEDURE — 99204 OFFICE O/P NEW MOD 45 MIN: CPT | Performed by: PODIATRIST

## 2022-12-13 NOTE — TELEPHONE ENCOUNTER
[facility]    Phone Message    May a detailed message be left on voicemail: yes     Reason for Call: Other: Pt mom is calling about the medication that he was given . She said that it didn't work in the past and not sure why it was given to him. And would like to know why pt wouldn't have a follow up sooner than a year. Please leave a detailed message if she does not       Action Taken: Other: ortho     Travel Screening: Not Applicable

## 2022-12-13 NOTE — LETTER
12/13/2022    RE: Isaiah Dominguez  [id number] [id number]    Dear Colleague,    Thank you for referring your patient, Isaiah Dominguez, to the [facility] [place]. Please see a copy of my visit note below.    Date of Service: 12/13/2022    Chief Complaint:   Chief Complaint   Patient presents with     Consult     Onychomycosis.         HPI: Isaiah Ferguson is a 37 year old male who presents today for further evaluation of bilateral nail fungus.  He was seen last month with primary care.  At that time, they did not want him to take oral Lamisil for the fungus.  They started him on Penlac.  He has been using this, as I can see it on his nails.  He is only been using it for a few weeks.    Review of Systems: Answers for HPI/ROS submitted by the patient on 12/13/2022  General Symptoms: No  Skin Symptoms: No  HENT Symptoms: No  EYE SYMPTOMS: No  HEART SYMPTOMS: No  LUNG SYMPTOMS: No  INTESTINAL SYMPTOMS: No  URINARY SYMPTOMS: No  REPRODUCTIVE SYMPTOMS: No  SKELETAL SYMPTOMS: No  BLOOD SYMPTOMS: No  NERVOUS SYSTEM SYMPTOMS: No  MENTAL HEALTH SYMPTOMS: No      PMH:   Past Medical History:   Diagnosis Date     Acne vulgaris      Allergies      Attention deficit disorder with hyperactivity(314.01)      Moderate intellectual disabilities      Photosensitive contact dermatitis      Pituitary dwarfism (H)        PSxH:   Past Surgical History:   Procedure Laterality Date     OPEN REDUCTION INTERNAL FIXATION HIP NAILING Right 12/20/2021    Procedure: Right tibial nail insertion;  Surgeon: Saleem Painter MD;  Location: [place] OR     [facility] NONSPECIFIC PROCEDURE      S/P bilateral PE tubes       Allergies: No known allergies    SH:   Social History     Socioeconomic History     Marital status: Single     Spouse name: Not on file     Number of children: Not on file     Years of education: Not on file     Highest education level: Not on file   Occupational History     Employer:  DISABLED   Tobacco Use     Smoking status: Never     Passive exposure: Yes     Smokeless tobacco: Never     Tobacco comments:     stepfather in the house   Vaping Use     Vaping Use: Never used   Substance and Sexual Activity     Alcohol use: No     Drug use: No     Sexual activity: Never   Other Topics Concern     Parent/sibling w/ CABG, MI or angioplasty before 65F 55M? Not Asked   Social History Narrative     Not on file     Social Determinants of Health     Financial Resource Strain: Not on file   Food Insecurity: Not on file   Transportation Needs: Not on file   Physical Activity: Not on file   Stress: Not on file   Social Connections: Not on file   Intimate Partner Violence: Not on file   Housing Stability: Not on file       FH:   Family History   Problem Relation Age of Onset     Unknown/Adopted No family hx of      Asthma No family hx of      C.A.D. No family hx of      Diabetes No family hx of      Hypertension No family hx of        Objective:  Data Unavailable Data Unavailable Data Unavailable Data Unavailable Data Unavailable 0 lbs 0 oz    PT and DP pulses are 2/4 bilaterally. CRT is instant.  Positive pedal hair.   Gross sensation is intact bilaterally.   Equinus is noted bilaterally. No pain with active or passive ROM of the ankle, MTJ, 1st ray, or halluces bilaterally,.   Nails of digits 1, 3, and 5 are mycotic bilaterally. No open lesions are noted.     Assessment: Cathleen is a 38 YO who presents today with an aide. I discussed with him and the aide that for maximum efficacy of topical antifungals, they need to be used for 52 weeks.  He also needs to be taken off all of the layers of Penlac every week with alcohol.  He has not been doing this.  I did send him in a prescription for Jublia.  This does not need to be taken off weekly.  If this is not covered, he can discontinue the Penlac.  After the visit, his mother called the clinic requesting clarification on directions and instructions.  I did give  this to her.  She also gave some more of the history.  I was on the phone with her for 10 minutes.    Plan:  - Pt seen and evaluated.  -If the Jublia not covered, continue with Penlac.  These need to be used for a year.  -See again as needed.  On the date of service I spent 45 minutes with patient care, documentation, chart review and review of primary care notes, Dr. Denson, and discussion with his mother.           Jm Zuniga DPM

## 2022-12-13 NOTE — PROGRESS NOTES
Date of Service: 12/13/2022    Chief Complaint:   Chief Complaint   Patient presents with     Consult     Onychomycosis.         HPI: Isaiah Ferguson is a 37 year old male who presents today for further evaluation of bilateral nail fungus.  He was seen last month with primary care.  At that time, they did not want him to take oral Lamisil for the fungus.  They started him on Penlac.  He has been using this, as I can see it on his nails.  He is only been using it for a few weeks.    Review of Systems: Answers for HPI/ROS submitted by the patient on 12/13/2022  General Symptoms: No  Skin Symptoms: No  HENT Symptoms: No  EYE SYMPTOMS: No  HEART SYMPTOMS: No  LUNG SYMPTOMS: No  INTESTINAL SYMPTOMS: No  URINARY SYMPTOMS: No  REPRODUCTIVE SYMPTOMS: No  SKELETAL SYMPTOMS: No  BLOOD SYMPTOMS: No  NERVOUS SYSTEM SYMPTOMS: No  MENTAL HEALTH SYMPTOMS: No      PMH:   Past Medical History:   Diagnosis Date     Acne vulgaris      Allergies      Attention deficit disorder with hyperactivity(314.01)      Moderate intellectual disabilities      Photosensitive contact dermatitis      Pituitary dwarfism (H)        PSxH:   Past Surgical History:   Procedure Laterality Date     OPEN REDUCTION INTERNAL FIXATION HIP NAILING Right 12/20/2021    Procedure: Right tibial nail insertion;  Surgeon: Saleem Painter MD;  Location: [place] OR     [facility] NONSPECIFIC PROCEDURE      S/P bilateral PE tubes       Allergies: No known allergies    SH:   Social History     Socioeconomic History     Marital status: Single     Spouse name: Not on file     Number of children: Not on file     Years of education: Not on file     Highest education level: Not on file   Occupational History     Employer: DISABLED   Tobacco Use     Smoking status: Never     Passive exposure: Yes     Smokeless tobacco: Never     Tobacco comments:     stepfather in the house   Vaping Use     Vaping Use: Never used   Substance and Sexual Activity     Alcohol use: No     Drug use: No      Sexual activity: Never   Other Topics Concern     Parent/sibling w/ CABG, MI or angioplasty before 65F 55M? Not Asked   Social History Narrative     Not on file     Social Determinants of Health     Financial Resource Strain: Not on file   Food Insecurity: Not on file   Transportation Needs: Not on file   Physical Activity: Not on file   Stress: Not on file   Social Connections: Not on file   Intimate Partner Violence: Not on file   Housing Stability: Not on file       FH:   Family History   Problem Relation Age of Onset     Unknown/Adopted No family hx of      Asthma No family hx of      C.A.D. No family hx of      Diabetes No family hx of      Hypertension No family hx of        Objective:  Data Unavailable Data Unavailable Data Unavailable Data Unavailable Data Unavailable 0 lbs 0 oz    PT and DP pulses are 2/4 bilaterally. CRT is instant.  Positive pedal hair.   Gross sensation is intact bilaterally.   Equinus is noted bilaterally. No pain with active or passive ROM of the ankle, MTJ, 1st ray, or halluces bilaterally,.   Nails of digits 1, 3, and 5 are mycotic bilaterally. No open lesions are noted.     Assessment: Cathleen is a 36 YO who presents today with an aide. I discussed with him and the aide that for maximum efficacy of topical antifungals, they need to be used for 52 weeks.  He also needs to be taken off all of the layers of Penlac every week with alcohol.  He has not been doing this.  I did send him in a prescription for Jublia.  This does not need to be taken off weekly.  If this is not covered, he can discontinue the Penlac.  After the visit, his mother called the clinic requesting clarification on directions and instructions.  I did give this to her.  She also gave some more of the history.  I was on the phone with her for 10 minutes.    Plan:  - Pt seen and evaluated.  -If the Jublia not covered, continue with Penlac.  These need to be used for a year.  -See again as needed.  On the date of  service I spent 45 minutes with patient care, documentation, chart review and review of primary care notes, Dr. Denson, and discussion with his mother.

## 2022-12-16 DIAGNOSIS — L70.0 ACNE VULGARIS: ICD-10-CM

## 2022-12-19 RX ORDER — METHOCARBAMOL 750 MG/1
TABLET ORAL
Qty: 42.5 G | Refills: 10 | Status: SHIPPED | OUTPATIENT
Start: 2022-12-19

## 2023-01-19 ENCOUNTER — OFFICE VISIT (OUTPATIENT)
Dept: FAMILY MEDICINE | Facility: CLINIC | Age: 38
End: 2023-01-19
Payer: MEDICARE

## 2023-01-19 VITALS
HEART RATE: 91 BPM | DIASTOLIC BLOOD PRESSURE: 80 MMHG | BODY MASS INDEX: 27.8 KG/M2 | SYSTOLIC BLOOD PRESSURE: 116 MMHG | OXYGEN SATURATION: 96 % | RESPIRATION RATE: 18 BRPM | HEIGHT: 66 IN | WEIGHT: 173 LBS | TEMPERATURE: 98.1 F

## 2023-01-19 DIAGNOSIS — K59.00 CONSTIPATION, UNSPECIFIED CONSTIPATION TYPE: Primary | ICD-10-CM

## 2023-01-19 PROCEDURE — 99213 OFFICE O/P EST LOW 20 MIN: CPT | Performed by: FAMILY MEDICINE

## 2023-01-19 RX ORDER — POLYETHYLENE GLYCOL 3350 17 G/17G
1 POWDER, FOR SOLUTION ORAL DAILY
Qty: 255 G | Refills: 1 | Status: SHIPPED | OUTPATIENT
Start: 2023-01-19 | End: 2023-02-28

## 2023-01-19 ASSESSMENT — PAIN SCALES - GENERAL: PAINLEVEL: NO PAIN (0)

## 2023-01-19 NOTE — NURSING NOTE
"Chief Complaint   Patient presents with     Constipation     /80 (Cuff Size: Adult Regular)   Pulse 91   Temp 98.1  F (36.7  C) (Tympanic)   Resp 18   Ht 1.676 m (5' 6\")   Wt 78.5 kg (173 lb)   SpO2 96%   BMI 27.92 kg/m   Estimated body mass index is 27.92 kg/m  as calculated from the following:    Height as of this encounter: 1.676 m (5' 6\").    Weight as of this encounter: 78.5 kg (173 lb).  Patient presents to the clinic using No DME      Health Maintenance that is potentially due pending provider review:    There are no preventive care reminders to display for this patient.             "

## 2023-02-14 DIAGNOSIS — F31.9 BIPOLAR 1 DISORDER (H): Primary | ICD-10-CM

## 2023-02-14 RX ORDER — QUETIAPINE FUMARATE 200 MG/1
200 TABLET, FILM COATED ORAL EVERY MORNING
Qty: 60 TABLET | Refills: 0 | Status: SHIPPED | OUTPATIENT
Start: 2023-02-14 | End: 2023-07-13

## 2023-02-14 NOTE — TELEPHONE ENCOUNTER
[facility] said that his Psychiatrist is no longer working at  [facility].  Requesting GRUPO JOEL CNP if she would order this medication until they can get pt in with another Psychiatrist.   They told Stephanie at [facility] that they do not have any other provider to fill this med.  Arelis Orn Station Sec

## 2023-02-15 ENCOUNTER — TELEPHONE (OUTPATIENT)
Dept: FAMILY MEDICINE | Facility: CLINIC | Age: 38
End: 2023-02-15
Payer: MEDICARE

## 2023-02-15 DIAGNOSIS — F31.9 BIPOLAR 1 DISORDER (H): Primary | ICD-10-CM

## 2023-02-15 DIAGNOSIS — F31.9 BIPOLAR 1 DISORDER (H): ICD-10-CM

## 2023-02-15 RX ORDER — QUETIAPINE FUMARATE 200 MG/1
200 TABLET, FILM COATED ORAL EVERY MORNING
Qty: 60 TABLET | Refills: 0 | Status: CANCELLED | OUTPATIENT
Start: 2023-02-15

## 2023-02-15 NOTE — TELEPHONE ENCOUNTER
Spoke with [place] pharmacist at [facility] verbal order given okay to change seroquel 200 mg directions from 1 tab PO Q AM to 1 tab PO Q HS as this is how he is taking it.    Julie Behrendt RN

## 2023-02-15 NOTE — TELEPHONE ENCOUNTER
Care giver calling. Patient has one dose left.     Does have psych appt at the end of the month. Asing for refill until then

## 2023-02-15 NOTE — TELEPHONE ENCOUNTER
Medication Question or Refill    What medication are you calling about (include dose and sig)?: QUEtiapine 200mg      Preferred Pharmacy:   [facility]. - [place], [place] - [address] [address] [place] [address]  Phone: [phone] Fax: [phone]      Who prescribed the medication?: Mercedes Donovan    Do you need a refill? Yes, this medication was sent in to pharmacy yesterday by Mercedes but the directions state to take in the morning. Pt takes these at night, wondering if a new order can be sent to pharmacy stating for pt to take them at night.      Okay to leave a detailed message?: Yes at Other phone number:  [phone] - [facility] home

## 2023-02-16 RX ORDER — QUETIAPINE FUMARATE 25 MG/1
25 TABLET, FILM COATED ORAL 2 TIMES DAILY
Qty: 60 TABLET | Refills: 0 | Status: SHIPPED | OUTPATIENT
Start: 2023-02-16 | End: 2023-07-13

## 2023-02-27 DIAGNOSIS — K59.00 CONSTIPATION, UNSPECIFIED CONSTIPATION TYPE: ICD-10-CM

## 2023-02-28 RX ORDER — POLYETHYLENE GLYCOL 3350 17 G/17G
POWDER, FOR SOLUTION ORAL
Qty: 510 G | Refills: 11 | Status: SHIPPED | OUTPATIENT
Start: 2023-02-28 | End: 2023-10-27

## 2023-03-09 ENCOUNTER — TELEPHONE (OUTPATIENT)
Dept: ORTHOPEDICS | Facility: CLINIC | Age: 38
End: 2023-03-09

## 2023-03-14 NOTE — TELEPHONE ENCOUNTER
Central Prior Authorization Team   Phone: [phone]      PA Initiation    Medication: Efinaconazole (JUBLIA) 10 % SOLN  Insurance Company: [employer] - Phone [phone] Fax [phone]  Pharmacy Filling the Rx: [facility]. - [place], [place] - [address]  Filling Pharmacy Phone: [phone]  Filling Pharmacy Fax:    Start Date: 3/14/2023

## 2023-03-14 NOTE — TELEPHONE ENCOUNTER
Prior Authorization Approval    Authorization Effective Date: 1/1/2023  Authorization Expiration Date: 3/13/2024  Medication: Efinaconazole (JUBLIA) 10 % SOLN  Approved Dose/Quantity: 4ml  Reference #:     Insurance Company: [employer] - Phone [phone] Fax [phone]  Expected CoPay:        Which Pharmacy is filling the prescription (Not needed for infusion/clinic administered): [facility] - [place], [place] - [address]  Pharmacy Notified: Yes  Patient Notified: No

## 2023-03-22 ENCOUNTER — TELEPHONE (OUTPATIENT)
Dept: FAMILY MEDICINE | Facility: CLINIC | Age: 38
End: 2023-03-22

## 2023-03-22 NOTE — TELEPHONE ENCOUNTER
Stephanie hooks. Requesting a discontinue order for the patients gavalax powder.     Fax order to [phone]  Attn: Lin    Ok to call Lin @ [phone]

## 2023-03-22 NOTE — TELEPHONE ENCOUNTER
"Spoke with Ana Lilia Herrera Mgr at  writer asked why they want to discontinue the miralax, she replied \"we thought it was only suppose to be short term\".     Ana Lilia reports patient is having regular formed stools Q 1-2 days since taking the miralax, denies any diarrhea loose/watery stools.     Plan is for patient to continue taking the miralax and  staff will call clinic back if patient begins having diarrhea/loose stools, Ana Lilia agreed with this plan.    Update sent to provider.    Julie Behrendt RN  "

## 2023-04-07 ENCOUNTER — TELEPHONE (OUTPATIENT)
Dept: ORTHOPEDICS | Facility: CLINIC | Age: 38
End: 2023-04-07
Payer: MEDICARE

## 2023-04-07 NOTE — TELEPHONE ENCOUNTER
April (direct support staff) is calling on behalf of patient. She has questions regarding patient Biran that was prescribe by Dr. Zuniga. Please call back at [phone]

## 2023-04-07 NOTE — TELEPHONE ENCOUNTER
Writer called talked with April on the phone. April stated that they are wondering with the Jublia if they put it on and leave it or follow the instructions for penlac. Writer confirmed with Dr. Zuniga and called pt back informing them to paint on daily for as many days listed in instructions. There is no need to clean off with alcohol wipes after so many days.     Bren Grant LPN

## 2023-04-11 ENCOUNTER — MEDICAL CORRESPONDENCE (OUTPATIENT)
Dept: HEALTH INFORMATION MANAGEMENT | Facility: CLINIC | Age: 38
End: 2023-04-11
Payer: MEDICARE

## 2023-05-22 ENCOUNTER — MEDICAL CORRESPONDENCE (OUTPATIENT)
Dept: HEALTH INFORMATION MANAGEMENT | Facility: CLINIC | Age: 38
End: 2023-05-22
Payer: MEDICARE

## 2023-06-29 ENCOUNTER — TELEPHONE (OUTPATIENT)
Dept: FAMILY MEDICINE | Facility: CLINIC | Age: 38
End: 2023-06-29
Payer: MEDICARE

## 2023-06-29 NOTE — TELEPHONE ENCOUNTER
[employer] forms completed. Copy sent to scanning.    Placed at  for .     Detailed message left on group home phone

## 2023-07-13 ENCOUNTER — OFFICE VISIT (OUTPATIENT)
Dept: FAMILY MEDICINE | Facility: CLINIC | Age: 38
End: 2023-07-13
Payer: MEDICARE

## 2023-07-13 VITALS
WEIGHT: 172 LBS | SYSTOLIC BLOOD PRESSURE: 118 MMHG | BODY MASS INDEX: 27.64 KG/M2 | TEMPERATURE: 97.5 F | RESPIRATION RATE: 16 BRPM | HEART RATE: 78 BPM | DIASTOLIC BLOOD PRESSURE: 80 MMHG | HEIGHT: 66 IN | OXYGEN SATURATION: 100 %

## 2023-07-13 DIAGNOSIS — Z00.00 MEDICARE ANNUAL WELLNESS VISIT, SUBSEQUENT: Primary | ICD-10-CM

## 2023-07-13 DIAGNOSIS — L70.0 ACNE VULGARIS: ICD-10-CM

## 2023-07-13 DIAGNOSIS — I10 BENIGN ESSENTIAL HYPERTENSION: ICD-10-CM

## 2023-07-13 DIAGNOSIS — F31.9 BIPOLAR 1 DISORDER (H): ICD-10-CM

## 2023-07-13 DIAGNOSIS — Z00.00 ENCOUNTER FOR MEDICARE ANNUAL WELLNESS EXAM: ICD-10-CM

## 2023-07-13 LAB
ALBUMIN SERPL BCG-MCNC: 4.9 G/DL (ref 3.5–5.2)
ALP SERPL-CCNC: 115 U/L (ref 40–129)
ALT SERPL W P-5'-P-CCNC: 36 U/L (ref 0–70)
ANION GAP SERPL CALCULATED.3IONS-SCNC: 8 MMOL/L (ref 7–15)
AST SERPL W P-5'-P-CCNC: 27 U/L (ref 0–45)
BILIRUB SERPL-MCNC: 0.4 MG/DL
BUN SERPL-MCNC: 7.5 MG/DL (ref 6–20)
CALCIUM SERPL-MCNC: 9.6 MG/DL (ref 8.6–10)
CHLORIDE SERPL-SCNC: 101 MMOL/L (ref 98–107)
CHOLEST SERPL-MCNC: 254 MG/DL
CREAT SERPL-MCNC: 0.84 MG/DL (ref 0.67–1.17)
DEPRECATED HCO3 PLAS-SCNC: 28 MMOL/L (ref 22–29)
ERYTHROCYTE [DISTWIDTH] IN BLOOD BY AUTOMATED COUNT: 13.2 % (ref 10–15)
GFR SERPL CREATININE-BSD FRML MDRD: >90 ML/MIN/1.73M2
GLUCOSE SERPL-MCNC: 96 MG/DL (ref 70–99)
HCT VFR BLD AUTO: 41.9 % (ref 40–53)
HDLC SERPL-MCNC: 43 MG/DL
HGB BLD-MCNC: 13.7 G/DL (ref 13.3–17.7)
LDLC SERPL CALC-MCNC: 195 MG/DL
MCH RBC QN AUTO: 29.3 PG (ref 26.5–33)
MCHC RBC AUTO-ENTMCNC: 32.7 G/DL (ref 31.5–36.5)
MCV RBC AUTO: 90 FL (ref 78–100)
NONHDLC SERPL-MCNC: 211 MG/DL
PLATELET # BLD AUTO: 355 10E3/UL (ref 150–450)
POTASSIUM SERPL-SCNC: 4.4 MMOL/L (ref 3.4–5.3)
PROT SERPL-MCNC: 8.1 G/DL (ref 6.4–8.3)
RBC # BLD AUTO: 4.68 10E6/UL (ref 4.4–5.9)
SODIUM SERPL-SCNC: 137 MMOL/L (ref 136–145)
TRIGL SERPL-MCNC: 82 MG/DL
WBC # BLD AUTO: 7.5 10E3/UL (ref 4–11)

## 2023-07-13 PROCEDURE — 85027 COMPLETE CBC AUTOMATED: CPT | Performed by: NURSE PRACTITIONER

## 2023-07-13 PROCEDURE — G0439 PPPS, SUBSEQ VISIT: HCPCS | Performed by: NURSE PRACTITIONER

## 2023-07-13 PROCEDURE — 80053 COMPREHEN METABOLIC PANEL: CPT | Performed by: NURSE PRACTITIONER

## 2023-07-13 PROCEDURE — 80061 LIPID PANEL: CPT | Performed by: NURSE PRACTITIONER

## 2023-07-13 PROCEDURE — 99214 OFFICE O/P EST MOD 30 MIN: CPT | Mod: 25 | Performed by: NURSE PRACTITIONER

## 2023-07-13 PROCEDURE — 36415 COLL VENOUS BLD VENIPUNCTURE: CPT | Performed by: NURSE PRACTITIONER

## 2023-07-13 RX ORDER — QUETIAPINE FUMARATE 25 MG/1
25 TABLET, FILM COATED ORAL 2 TIMES DAILY
Qty: 90 TABLET | Refills: 3 | Status: SHIPPED | OUTPATIENT
Start: 2023-07-13 | End: 2024-07-15

## 2023-07-13 RX ORDER — QUETIAPINE FUMARATE 200 MG/1
200 TABLET, FILM COATED ORAL EVERY MORNING
Qty: 90 TABLET | Refills: 3 | Status: SHIPPED | OUTPATIENT
Start: 2023-07-13 | End: 2024-07-15

## 2023-07-13 RX ORDER — LISINOPRIL 20 MG/1
TABLET ORAL
Qty: 90 TABLET | Refills: 3 | Status: SHIPPED | OUTPATIENT
Start: 2023-07-13 | End: 2024-07-15

## 2023-07-13 ASSESSMENT — ENCOUNTER SYMPTOMS
SHORTNESS OF BREATH: 0
PALPITATIONS: 0
CONSTIPATION: 0
DIZZINESS: 0
HEARTBURN: 0
PARESTHESIAS: 0
DYSURIA: 0
HEADACHES: 0
HEMATURIA: 0
NAUSEA: 0
DIARRHEA: 0
COUGH: 0
ABDOMINAL PAIN: 0
CHILLS: 0
ARTHRALGIAS: 0
FREQUENCY: 0
SORE THROAT: 0
HEMATOCHEZIA: 0
NERVOUS/ANXIOUS: 0
FEVER: 0
JOINT SWELLING: 0
WEAKNESS: 0
EYE PAIN: 0
MYALGIAS: 0

## 2023-07-13 ASSESSMENT — PATIENT HEALTH QUESTIONNAIRE - PHQ9
10. IF YOU CHECKED OFF ANY PROBLEMS, HOW DIFFICULT HAVE THESE PROBLEMS MADE IT FOR YOU TO DO YOUR WORK, TAKE CARE OF THINGS AT HOME, OR GET ALONG WITH OTHER PEOPLE: EXTREMELY DIFFICULT
SUM OF ALL RESPONSES TO PHQ QUESTIONS 1-9: 0
SUM OF ALL RESPONSES TO PHQ QUESTIONS 1-9: 0

## 2023-07-13 ASSESSMENT — PAIN SCALES - GENERAL: PAINLEVEL: NO PAIN (0)

## 2023-07-13 ASSESSMENT — ACTIVITIES OF DAILY LIVING (ADL): CURRENT_FUNCTION: HOUSEWORK REQUIRES ASSISTANCE

## 2023-07-13 NOTE — PROGRESS NOTES
"SUBJECTIVE:   Isaiah Ferguson is a 37 year old who presents for Preventive Visit.      7/13/2023     1:06 PM   Additional Questions   Roomed by Erika MONTES     Are you in the first 12 months of your Medicare coverage?  No    Healthy Habits:     In general, how would you rate your overall health?  Good    Frequency of exercise:  6-7 days/week    Duration of exercise:  Less than 15 minutes    Do you usually eat at least 4 servings of fruit and vegetables a day, include whole grains    & fiber and avoid regularly eating high fat or \"junk\" foods?  No    Taking medications regularly:  Yes    Medication side effects:  None    Ability to successfully perform activities of daily living:  Housework requires assistance    Home Safety:  Throw rugs in the hallway    Hearing Impairment:  Find that men's voices are easier to understand than woman's    In the past 6 months, have you been bothered by leaking of urine?  No    In general, how would you rate your overall mental or emotional health?  Excellent    Additional concerns today:  No        Have you ever done Advance Care Planning? (For example, a Health Directive, POLST, or a discussion with a medical provider or your loved ones about your wishes): No, advance care planning information given to patient to review.  Patient declined advance care planning discussion at this time.       Fall risk  Fallen 2 or more times in the past year?: No  Any fall with injury in the past year?: No    Cognitive Screening Not appropriate due to mental handicap    Do you have sleep apnea, excessive snoring or daytime drowsiness?: no    Reviewed and updated as needed this visit by clinical staff                  Reviewed and updated as needed this visit by Provider                 Social History     Tobacco Use    Smoking status: Never     Passive exposure: Yes    Smokeless tobacco: Never    Tobacco comments:     stepfather in the house   Substance Use Topics    Alcohol use: No             7/13/2023     " 1:09 PM   Alcohol Use   Prescreen: >3 drinks/day or >7 drinks/week? No     Do you have a current opioid prescription? No  Do you use any other controlled substances or medications that are not prescribed by a provider? None              Current providers sharing in care for this patient include:   Patient Care Team:  Mercedes Donovan APRN CNP as PCP - General (Family Medicine)  Mercedes Donovan APRN CNP as Assigned PCP  Jm Zuniga DPM as Assigned Musculoskeletal Provider    The following health maintenance items are reviewed in Epic and correct as of today:  Health Maintenance   Topic Date Due    ANNUAL REVIEW OF HM ORDERS  06/27/2023    MEDICARE ANNUAL WELLNESS VISIT  08/11/2023    INFLUENZA VACCINE (1) 09/01/2023    LIPID  06/22/2024    ADVANCE CARE PLANNING  08/15/2027    DTAP/TDAP/TD IMMUNIZATION (9 - Td or Tdap) 06/04/2029    HEPATITIS C SCREENING  Completed    HIV SCREENING  Completed    PHQ-2 (once per calendar year)  Completed    IPV IMMUNIZATION  Completed    HEPATITIS B IMMUNIZATION  Completed    COVID-19 Vaccine  Completed    Pneumococcal Vaccine: Pediatrics (0 to 5 Years) and At-Risk Patients (6 to 64 Years)  Aged Out    MENINGITIS IMMUNIZATION  Aged Out     Lab work is in process  Labs reviewed in EPIC  BP Readings from Last 3 Encounters:   07/13/23 118/80   01/19/23 116/80   10/13/22 120/72    Wt Readings from Last 3 Encounters:   07/13/23 78 kg (172 lb)   01/19/23 78.5 kg (173 lb)   10/13/22 81.6 kg (180 lb)                  Patient Active Problem List   Diagnosis    Attention deficit hyperactivity disorder (ADHD)    Pituitary dwarfism (H)    CARDIOVASCULAR SCREENING; LDL GOAL LESS THAN 160    Auditory hallucinations    MR (mental retardation), moderate    Benign essential hypertension    Fall, initial encounter    Closed fracture of proximal end of right fibula, unspecified fracture morphology, initial encounter    Closed fracture of distal end of right tibia, unspecified fracture  morphology, initial encounter    Pain in joint, ankle and foot, right     Past Surgical History:   Procedure Laterality Date    OPEN REDUCTION INTERNAL FIXATION HIP NAILING Right 12/20/2021    Procedure: Right tibial nail insertion;  Surgeon: Saleem Painter MD;  Location: [facility] OR    [facility] NONSPECIFIC PROCEDURE      S/P bilateral PE tubes       Social History     Tobacco Use    Smoking status: Never     Passive exposure: Yes    Smokeless tobacco: Never    Tobacco comments:     stepfather in the house   Substance Use Topics    Alcohol use: No     Family History   Problem Relation Age of Onset    Unknown/Adopted No family hx of     Asthma No family hx of     C.A.D. No family hx of     Diabetes No family hx of     Hypertension No family hx of          Current Outpatient Medications   Medication Sig Dispense Refill    ACNE MEDICATION 5 5 % topical gel APPLY TOPICALLY ONCE DAILY *1 TOTAL FILL* **NON-COVERED MED** 42.5 g 10    atomoxetine (STRATTERA) 25 MG capsule Take 25 mg by mouth daily      ciclopirox (PENLAC) 8 % external solution APPLY TO ADJACENT SKIN & AFFECTED NAILS ONCE DAILY. REMOVE WITH ALCOHOL EVERY 7 DAYS & REPEAT **NON-COVERED MED** 6.6 mL 11    diphenhydrAMINE (BENADRYL) 25 MG tablet Take 1 tablet (25 mg) by mouth every 8 hours as needed for itching or allergies 60 tablet 1    Efinaconazole 10 % SOLN Externally apply topically daily 4 mL 11    GAVILAX 17 GM/SCOOP powder MIX 17GM (MEASURE WITH MARKINGS INSIDE CAP) IN LIQUID AND DRINK BY MOUTH ONCE DAILY *1 TOTAL FILL* 510 g 11    LAMICTAL 100 MG OR TABS 1 TABLET TWICE DAILY 60 0    lisinopril (ZESTRIL) 20 MG tablet TAKE 1 TABLET BY MOUTH ONCE DAILY 90 tablet 3    OMEGA 3 1000 MG OR CAPS 2 CAPSULES ORALLY TWICE DAILY      QUEtiapine (SEROQUEL) 200 MG tablet Take 1 tablet (200 mg) by mouth every morning 90 tablet 3    QUEtiapine (SEROQUEL) 25 MG tablet Take 1 tablet (25 mg) by mouth 2 times daily 90 tablet 3    mupirocin (BACTROBAN) 2 % cream Apply  "topically 3 times daily (Patient not taking: Reported on 7/13/2023) 15 g 1     Allergies   Allergen Reactions    No Known Allergies      Recent Labs   Lab Test 06/27/22  0944 12/20/21  0223 02/25/21  1538 06/22/19  0940 02/23/17  1559   A1C 5.7*  --   --   --   --    LDL  --   --   --  171*  --    HDL  --   --   --  42  --    TRIG  --   --   --  82  --    ALT 47  --   --   --   --    CR 0.80 0.88 0.74  --  0.77   GFRESTIMATED >90 >90 >90  --  >90  Non  GFR Calc     GFRESTBLACK  --   --  >90  --  >90  African American GFR Calc     POTASSIUM 4.7 4.0 3.9  --  4.6              Review of Systems   Constitutional: Negative for chills and fever.   HENT: Negative for congestion, ear pain, hearing loss and sore throat.    Eyes: Negative for pain and visual disturbance.   Respiratory: Negative for cough and shortness of breath.    Cardiovascular: Negative for chest pain, palpitations and peripheral edema.   Gastrointestinal: Negative for abdominal pain, constipation, diarrhea, heartburn, hematochezia and nausea.   Genitourinary: Negative for dysuria, frequency, genital sores, hematuria, impotence, penile discharge and urgency.   Musculoskeletal: Negative for arthralgias, joint swelling and myalgias.   Skin: Negative for rash.   Neurological: Negative for dizziness, weakness, headaches and paresthesias.   Psychiatric/Behavioral: Negative for mood changes. The patient is not nervous/anxious.          OBJECTIVE:   /80 (BP Location: Right arm, Cuff Size: Adult Large)   Pulse 78   Temp 97.5  F (36.4  C) (Tympanic)   Resp 16   Ht 1.676 m (5' 6\")   Wt 78 kg (172 lb)   SpO2 100%   BMI 27.76 kg/m   Estimated body mass index is 27.92 kg/m  as calculated from the following:    Height as of 1/19/23: 1.676 m (5' 6\").    Weight as of 1/19/23: 78.5 kg (173 lb).  Physical Exam  GENERAL: healthy, alert and no distress  EYES: Eyes grossly normal to inspection, PERRL and conjunctivae and sclerae normal  HENT: ear " canals and TM's normal, nose and mouth without ulcers or lesions  NECK: no adenopathy, no asymmetry, masses, or scars and thyroid normal to palpation  RESP: lungs clear to auscultation - no rales, rhonchi or wheezes  CV: regular rate and rhythm, normal S1 S2, no S3 or S4, no murmur, click or rub, no peripheral edema and peripheral pulses strong  ABDOMEN: soft, nontender, no hepatosplenomegaly, no masses and bowel sounds normal  MS: no gross musculoskeletal defects noted, no edema  SKIN: no suspicious lesions or rashes  NEURO: Normal strength and tone, mentation intact and speech normal  PSYCH: mentation appears normal, affect normal/bright    Diagnostic Test Results:  Labs reviewed in Epic    ASSESSMENT / PLAN:       ICD-10-CM    1. Medicare annual wellness visit, subsequent  Z00.00 REVIEW OF HEALTH MAINTENANCE PROTOCOL ORDERS     lisinopril (ZESTRIL) 20 MG tablet     CBC with platelets     Comprehensive metabolic panel (BMP + Alb, Alk Phos, ALT, AST, Total. Bili, TP)     Lipid panel reflex to direct LDL Fasting     CBC with platelets     Comprehensive metabolic panel (BMP + Alb, Alk Phos, ALT, AST, Total. Bili, TP)     Lipid panel reflex to direct LDL Fasting      2. Encounter for Medicare annual wellness exam  Z00.00       3. Acne vulgaris  L70.0 OFFICE/OUTPT VISIT,EST,LEVL III      4. Bipolar 1 disorder (H)  F31.9 QUEtiapine (SEROQUEL) 25 MG tablet     QUEtiapine (SEROQUEL) 200 MG tablet     OFFICE/OUTPT VISIT,EST,LEVL III      5. Benign essential hypertension  I10 lisinopril (ZESTRIL) 20 MG tablet     OFFICE/OUTPT VISIT,EST,LEVL III          Patient has been advised of split billing requirements and indicates understanding: Yes      COUNSELING:  Reviewed preventive health counseling, as reflected in patient instructions       Consider AAA screening for ages 65-75 and smoking history       Regular exercise       Healthy diet/nutrition       Vision screening       Hearing screening       Dental care       Bladder  "control       Fall risk prevention       Aspirin prophylaxis        Alcohol Use        Folic Acid      BMI:   Estimated body mass index is 27.92 kg/m  as calculated from the following:    Height as of 1/19/23: 1.676 m (5' 6\").    Weight as of 1/19/23: 78.5 kg (173 lb).   Weight management plan: Discussed healthy diet and exercise guidelines      He reports that he has never smoked. He has been exposed to tobacco smoke. He has never used smokeless tobacco.      Appropriate preventive services were discussed with this patient, including applicable screening as appropriate for cardiovascular disease, diabetes, osteopenia/osteoporosis, and glaucoma.  As appropriate for age/gender, discussed screening for colorectal cancer, prostate cancer, breast cancer, and cervical cancer. Checklist reviewing preventive services available has been given to the patient.    Reviewed patients plan of care and provided an AVS. The Basic Care Plan (routine screening as documented in Health Maintenance) for Isaiah Ferguson meets the Care Plan requirement. This Care Plan has been established and reviewed with the Patient and caregiver.          MARYCRUZ Calhoun [facility]    Identified Health Risks:  I have reviewed Opioid Use Disorder and Substance Use Disorder risk factors and made any needed referrals. The patient was counseled and encouraged to consider modifying their diet and eating habits. He was provided with information on recommended healthy diet options.  The patient reports that he has difficulty with activities of daily living. I have asked that the patient make a follow up appointment in 52 weeks where this issue will be further evaluated and addressed.  The patient was provided with written information regarding signs of hearing loss.  "

## 2023-07-13 NOTE — LETTER
July 14, 2023      Isaiah Dominguez  [id number] [facility] [id number]        Dear ,    We are writing to inform you of your test results.    CBC was within normal limits with normal hemoglobin levels.  Comprehensive panel is within normal limits with normal kidney and liver functions.     Cholesterol numbers are mildly elevated with a total cholesterol at 254 we like to see that below 200 recommend continued low-cholesterol diet and exercise and recheck again at his next annual visit.      Resulted Orders   CBC with platelets   Result Value Ref Range    WBC Count 7.5 4.0 - 11.0 10e3/uL    RBC Count 4.68 4.40 - 5.90 10e6/uL    Hemoglobin 13.7 13.3 - 17.7 g/dL    Hematocrit 41.9 40.0 - 53.0 %    MCV 90 78 - 100 fL    MCH 29.3 26.5 - 33.0 pg    MCHC 32.7 31.5 - 36.5 g/dL    RDW 13.2 10.0 - 15.0 %    Platelet Count 355 150 - 450 10e3/uL   Comprehensive metabolic panel (BMP + Alb, Alk Phos, ALT, AST, Total. Bili, TP)   Result Value Ref Range    Sodium 137 136 - 145 mmol/L    Potassium 4.4 3.4 - 5.3 mmol/L    Chloride 101 98 - 107 mmol/L    Carbon Dioxide (CO2) 28 22 - 29 mmol/L    Anion Gap 8 7 - 15 mmol/L    Urea Nitrogen 7.5 6.0 - 20.0 mg/dL    Creatinine 0.84 0.67 - 1.17 mg/dL    Calcium 9.6 8.6 - 10.0 mg/dL    Glucose 96 70 - 99 mg/dL    Alkaline Phosphatase 115 40 - 129 U/L    AST 27 0 - 45 U/L      Comment:      Reference intervals for this test were updated on 6/12/2023 to more accurately reflect our healthy population. There may be differences in the flagging of prior results with similar values performed with this method. Interpretation of those prior results can be made in the context of the updated reference intervals.    ALT 36 0 - 70 U/L      Comment:      Reference intervals for this test were updated on 6/12/2023 to more accurately reflect our healthy population. There may be differences in the flagging of prior results with similar values performed with this method. Interpretation of  those prior results can be made in the context of the updated reference intervals.      Protein Total 8.1 6.4 - 8.3 g/dL    Albumin 4.9 3.5 - 5.2 g/dL    Bilirubin Total 0.4 <=1.2 mg/dL    GFR Estimate >90 >60 mL/min/1.73m2   Lipid panel reflex to direct LDL Fasting   Result Value Ref Range    Cholesterol 254 (H) <200 mg/dL    Triglycerides 82 <150 mg/dL    Direct Measure HDL 43 >=40 mg/dL    LDL Cholesterol Calculated 195 (H) <=100 mg/dL    Non HDL Cholesterol 211 (H) <130 mg/dL    Narrative    Cholesterol  Desirable:  <200 mg/dL    Triglycerides  Normal:  Less than 150 mg/dL  Borderline High:  150-199 mg/dL  High:  200-499 mg/dL  Very High:  Greater than or equal to 500 mg/dL    Direct Measure HDL  Female:  Greater than or equal to 50 mg/dL   Male:  Greater than or equal to 40 mg/dL    LDL Cholesterol  Desirable:  <100mg/dL  Above Desirable:  100-129 mg/dL   Borderline High:  130-159 mg/dL   High:  160-189 mg/dL   Very High:  >= 190 mg/dL    Non HDL Cholesterol  Desirable:  130 mg/dL  Above Desirable:  130-159 mg/dL  Borderline High:  160-189 mg/dL  High:  190-219 mg/dL  Very High:  Greater than or equal to 220 mg/dL       If you have any questions or concerns, please call the clinic at the number listed above.       Sincerely,      MARYCRUZ Calhoun CNP

## 2023-07-13 NOTE — PATIENT INSTRUCTIONS
Patient Education   Personalized Prevention Plan  You are due for the preventive services outlined below.  Your care team is available to assist you in scheduling these services.  If you have already completed any of these items, please share that information with your care team to update in your medical record.  Health Maintenance Due   Topic Date Due     Annual Wellness Visit  08/11/2023       Understanding USDA MyPlate  The USDA has guidelines to help you make healthy food choices. These are called MyPlate. MyPlate shows the food groups that make up healthy meals using the image of a place setting. Before you eat, think about the healthiest choices for what to put on your plate or in your cup or bowl. To learn more about building a healthy plate, visit www.myplate.gov.     The food groups  Fruits. Any fruit or 100% fruit juice counts as part of the Fruit Group. Fruits may be fresh, canned, frozen, or dried, and may be whole, cut-up, or pureed. Make 1/2 of your plate fruits and vegetables.  Vegetables. Any vegetable or 100% vegetable juice counts as a member of the Vegetable Group. Vegetables may be fresh, frozen, canned, or dried. They can be served raw or cooked and may be whole, cut-up, or mashed. Make 1/2 of your plate fruits and vegetables.  Grains. All foods made from grains are part of the Grains Group. These include wheat, rice, oats, cornmeal, and barley. Grains are often used to make foods such as bread, pasta, oatmeal, cereal, tortillas, and grits. Grains should be no more than 1/4 of your plate. At least half of your grains should be whole grains.  Protein. This group includes meat, poultry, seafood, beans and peas, eggs, processed soy products (such as tofu), nuts (including nut butters), and seeds. Make protein choices no more than 1/4 of your plate. Meat and poultry choices should be lean or low fat.  Dairy. The Dairy Group includes all fluid milk products and foods made from milk that contain  calcium, such as yogurt and cheese. (Foods that have little calcium, such as cream, butter, and cream cheese, are not part of this group.) Most dairy choices should be low-fat or fat-free.  Things to limit  Eating healthy also means limiting these things in your diet:  Oils. Oils aren't a food group, but they do contain essential nutrients. These are fats that are liquid at room temperature. Including small amounts of oils in your diet is fine and can even be good for you. But it's important to watch how much oil you include in your diet. Oils include avocado, canola, corn, olive, soybean, vegetable, and sunflower. Foods that are mainly oil include mayonnaise, certain salad dressings, and soft margarines. You likely already get your daily oil allowance from the foods you eat.  Salt (sodium).  Many processed foods have a lot of sodium. To keep sodium intake down, eat fresh vegetables, meats, poultry, and seafood when possible. Buy low-sodium, reduced-sodium, or no-salt-added food products at the store. And don't add salt to your meals at home. Instead, season them with herbs and spices such as dill, oregano, cumin, and paprika. Or try adding flavor with vinegar, onion, garlic, or lemon or lime zest and juice.  Saturated fat . Saturated fats are most often found in animal products such as beef, pork, and chicken. They are often solid at room temperature, such as butter. To reduce your saturated fat intake, choose leaner cuts of meat and poultry. And try healthier cooking methods such as grilling, broiling, roasting, or baking. For a simple lower-fat swap, use plain nonfat yogurt instead of mayonnaise when making potato salad or macaroni salad.  Added sugars.  These are sugars added to foods. They are in foods such as ice cream, candy, soda, fruit drinks, sports drinks, energy drinks, cookies, pastries, jams, and syrups. Cut down on added sugars by sharing sweet treats with a family member or friend. You can also  choose fruit for dessert, and drink water or other unsweetened beverages.  Alcohol. Alcohol contains calories but few nutrients. If you don't drink alcohol, don't start drinking for any reason. But if you do choose to drink alcohol, do so only in moderation. This means no more than 2 drinks in a day for men and no more than 1 drink per day for women, on days when you have alcohol.  [employer] last reviewed this educational content on 1/1/2023 2000-2023 [employer]. All rights reserved. This information is not intended as a substitute for professional medical care. Always follow your healthcare professional's instructions.        Activities of Daily Living    Your Health Risk Assessment indicates you have difficulties with activities of daily living such as housework, bathing, preparing meals, taking medication, etc. Please make a follow up appointment for us to address this issue in more detail.    Signs of Hearing Loss  Hearing loss is a problem shared by many people. In fact, it's one of the most common health problems, particularly as people age. Most people aged 65 and older have some hearing loss. By age 80, almost everyone does. Hearing loss often occurs slowly over the years. So, you may not realize your hearing has gotten worse.   When sudden hearing loss occurs, it's important to contact your healthcare provider right away. Your provider will do a medical exam and a hearing exam as soon as possible. This is to help find the cause and type of your sudden hearing loss. Based on your diagnosis, your healthcare provider will discuss possible treatments.      Hearing much better with one ear can be a sign of hearing loss.     Have your hearing checked  Call your healthcare provider if you:   Have to strain to hear normal conversation  Have to watch other people s faces very carefully to follow what they re saying  Need to ask people to repeat what they ve said  Often misunderstand what people are  saying  Turn the volume of the television or radio up so high that others complain  Feel that people are mumbling when they re talking to you  Find that the effort to hear leaves you feeling tired and irritated  Notice, when using the phone, that you hear better with one ear than the other  [place] last reviewed this educational content on 6/1/2022 2000-2023 [employer]. All rights reserved. This information is not intended as a substitute for professional medical care. Always follow your healthcare professional's instructions.

## 2023-07-14 ENCOUNTER — TELEPHONE (OUTPATIENT)
Dept: FAMILY MEDICINE | Facility: CLINIC | Age: 38
End: 2023-07-14
Payer: MEDICARE

## 2023-07-14 NOTE — TELEPHONE ENCOUNTER
Stephanie supervisor of the  states they made a med error he was receiving omega 3 fish oil 1200 mg 3 caps BID when current order is 2 caps PO BID.    No adverse SE noted. Advised to monitor for any signs of bleeding, high blood sugar, changes in level of alertness and low blood pressure. C/G to update care team with any adverse effects.     Update sent to PCP as DEVIN.    Julie Behrendt RN

## 2023-09-14 ENCOUNTER — OFFICE VISIT (OUTPATIENT)
Dept: FAMILY MEDICINE | Facility: CLINIC | Age: 38
End: 2023-09-14
Payer: MEDICARE

## 2023-09-14 VITALS
BODY MASS INDEX: 27.8 KG/M2 | TEMPERATURE: 98.4 F | DIASTOLIC BLOOD PRESSURE: 86 MMHG | HEART RATE: 80 BPM | WEIGHT: 173 LBS | HEIGHT: 66 IN | RESPIRATION RATE: 20 BRPM | OXYGEN SATURATION: 95 % | SYSTOLIC BLOOD PRESSURE: 135 MMHG

## 2023-09-14 DIAGNOSIS — Z13.220 SCREENING FOR HYPERLIPIDEMIA: ICD-10-CM

## 2023-09-14 DIAGNOSIS — Z23 ENCOUNTER FOR VACCINATION: ICD-10-CM

## 2023-09-14 DIAGNOSIS — E78.5 HYPERLIPIDEMIA LDL GOAL <70: ICD-10-CM

## 2023-09-14 DIAGNOSIS — I10 BENIGN ESSENTIAL HYPERTENSION: Primary | ICD-10-CM

## 2023-09-14 LAB
ALBUMIN SERPL BCG-MCNC: 5 G/DL (ref 3.5–5.2)
ALP SERPL-CCNC: 112 U/L (ref 40–129)
ALT SERPL W P-5'-P-CCNC: 51 U/L (ref 0–70)
ANION GAP SERPL CALCULATED.3IONS-SCNC: 10 MMOL/L (ref 7–15)
AST SERPL W P-5'-P-CCNC: 29 U/L (ref 0–45)
BILIRUB SERPL-MCNC: 0.3 MG/DL
BUN SERPL-MCNC: 11.5 MG/DL (ref 6–20)
CALCIUM SERPL-MCNC: 9.8 MG/DL (ref 8.6–10)
CHLORIDE SERPL-SCNC: 102 MMOL/L (ref 98–107)
CHOLEST SERPL-MCNC: 260 MG/DL
CREAT SERPL-MCNC: 0.9 MG/DL (ref 0.67–1.17)
DEPRECATED HCO3 PLAS-SCNC: 27 MMOL/L (ref 22–29)
EGFRCR SERPLBLD CKD-EPI 2021: >90 ML/MIN/1.73M2
GLUCOSE SERPL-MCNC: 92 MG/DL (ref 70–99)
HDLC SERPL-MCNC: 39 MG/DL
LDLC SERPL CALC-MCNC: 196 MG/DL
NONHDLC SERPL-MCNC: 221 MG/DL
POTASSIUM SERPL-SCNC: 4.3 MMOL/L (ref 3.4–5.3)
PROT SERPL-MCNC: 7.9 G/DL (ref 6.4–8.3)
SODIUM SERPL-SCNC: 139 MMOL/L (ref 136–145)
TRIGL SERPL-MCNC: 126 MG/DL

## 2023-09-14 PROCEDURE — 80053 COMPREHEN METABOLIC PANEL: CPT | Performed by: NURSE PRACTITIONER

## 2023-09-14 PROCEDURE — 80061 LIPID PANEL: CPT | Performed by: NURSE PRACTITIONER

## 2023-09-14 PROCEDURE — 90686 IIV4 VACC NO PRSV 0.5 ML IM: CPT | Performed by: NURSE PRACTITIONER

## 2023-09-14 PROCEDURE — 99214 OFFICE O/P EST MOD 30 MIN: CPT | Performed by: NURSE PRACTITIONER

## 2023-09-14 PROCEDURE — 36415 COLL VENOUS BLD VENIPUNCTURE: CPT | Performed by: NURSE PRACTITIONER

## 2023-09-14 PROCEDURE — G0008 ADMIN INFLUENZA VIRUS VAC: HCPCS | Performed by: NURSE PRACTITIONER

## 2023-09-14 RX ORDER — OLOPATADINE HYDROCHLORIDE 2 MG/ML
1 SOLUTION/ DROPS OPHTHALMIC DAILY
COMMUNITY

## 2023-09-14 ASSESSMENT — PAIN SCALES - GENERAL: PAINLEVEL: NO PAIN (0)

## 2023-09-14 NOTE — NURSING NOTE
"Chief Complaint   Patient presents with    Eye Problem       Initial /86 (BP Location: Right arm, Patient Position: Sitting, Cuff Size: Adult Regular)   Pulse 80   Temp 98.4  F (36.9  C) (Temporal)   Resp 20   Ht 1.676 m (5' 5.98\")   Wt 78.5 kg (173 lb)   SpO2 95%   BMI 27.94 kg/m   Estimated body mass index is 27.94 kg/m  as calculated from the following:    Height as of this encounter: 1.676 m (5' 5.98\").    Weight as of this encounter: 78.5 kg (173 lb).    Patient presents to the clinic using No DME    Is there anyone who you would like to be able to receive your results? No  If yes have patient fill out JORDYN      "

## 2023-09-14 NOTE — PROGRESS NOTES
"  Assessment & Plan     Benign essential hypertension  .klcon  - Comprehensive metabolic panel (BMP + Alb, Alk Phos, ALT, AST, Total. Bili, TP)  - Lipid panel reflex to direct LDL Fasting  - Comprehensive metabolic panel (BMP + Alb, Alk Phos, ALT, AST, Total. Bili, TP)  - Lipid panel reflex to direct LDL Fasting    Screening for hyperlipidemia  Will screen for hyperlipidemia due to finding at eye doctor   - Comprehensive metabolic panel (BMP + Alb, Alk Phos, ALT, AST, Total. Bili, TP)  - Lipid panel reflex to direct LDL Fasting  - Comprehensive metabolic panel (BMP + Alb, Alk Phos, ALT, AST, Total. Bili, TP)  - Lipid panel reflex to direct LDL Fasting    Encounter for vaccination    - INFLUENZA VACCINE IM > 6 MONTHS VALENT IIV4 (AFLURIA/FLUZONE)    }  MARYCRUZ Calhoun CNP  [facility]    Rachel Colon Jr is a 38 year old, presenting for the following health issues:  No chief complaint on file.        9/14/2023     4:33 PM   Additional Questions   Roomed by alyce chen cma   Accompanied by april - staff       History of Present Illness       Reason for visit:  Arcus in his eyes due to high cholesterol    He eats 2-3 servings of fruits and vegetables daily.He consumes 1 sweetened beverage(s) daily.He exercises with enough effort to increase his heart rate 9 or less minutes per day.  He exercises with enough effort to increase his heart rate 3 or less days per week.   He is taking medications regularly.       Review of Systems   Constitutional, HEENT, cardiovascular, pulmonary, gi and gu systems are negative, except as otherwise noted.      Objective    /86 (BP Location: Right arm, Patient Position: Sitting, Cuff Size: Adult Regular)   Pulse 80   Temp 98.4  F (36.9  C) (Temporal)   Resp 20   Ht 1.676 m (5' 5.98\")   Wt 78.5 kg (173 lb)   SpO2 95%   BMI 27.94 kg/m    Body mass index is 27.94 kg/m .  Physical Exam   GENERAL: healthy, alert and no distress  EYES: Eyes grossly " normal to inspection, PERRL and conjunctivae and sclerae normal  NECK: no adenopathy, no asymmetry, masses, or scars and thyroid normal to palpation  RESP: lungs clear to auscultation - no rales, rhonchi or wheezes  CV: regular rate and rhythm, normal S1 S2, no S3 or S4, no murmur, click or rub, no peripheral edema and peripheral pulses strong  MS: no gross musculoskeletal defects noted, no edema  SKIN: no suspicious lesions or rashes  NEURO: Normal strength and tone, mentation intact and speech normal  PSYCH: mentation appears normal, affect normal/bright    Results for orders placed or performed in visit on 09/14/23   Comprehensive metabolic panel (BMP + Alb, Alk Phos, ALT, AST, Total. Bili, TP)     Status: Normal   Result Value Ref Range    Sodium 139 136 - 145 mmol/L    Potassium 4.3 3.4 - 5.3 mmol/L    Chloride 102 98 - 107 mmol/L    Carbon Dioxide (CO2) 27 22 - 29 mmol/L    Anion Gap 10 7 - 15 mmol/L    Urea Nitrogen 11.5 6.0 - 20.0 mg/dL    Creatinine 0.90 0.67 - 1.17 mg/dL    Calcium 9.8 8.6 - 10.0 mg/dL    Glucose 92 70 - 99 mg/dL    Alkaline Phosphatase 112 40 - 129 U/L    AST 29 0 - 45 U/L    ALT 51 0 - 70 U/L    Protein Total 7.9 6.4 - 8.3 g/dL    Albumin 5.0 3.5 - 5.2 g/dL    Bilirubin Total 0.3 <=1.2 mg/dL    GFR Estimate >90 >60 mL/min/1.73m2   Lipid panel reflex to direct LDL Fasting     Status: Abnormal   Result Value Ref Range    Cholesterol 260 (H) <200 mg/dL    Triglycerides 126 <150 mg/dL    Direct Measure HDL 39 (L) >=40 mg/dL    LDL Cholesterol Calculated 196 (H) <=100 mg/dL    Non HDL Cholesterol 221 (H) <130 mg/dL    Narrative    Cholesterol  Desirable:  <200 mg/dL    Triglycerides  Normal:  Less than 150 mg/dL  Borderline High:  150-199 mg/dL  High:  200-499 mg/dL  Very High:  Greater than or equal to 500 mg/dL    Direct Measure HDL  Female:  Greater than or equal to 50 mg/dL   Male:  Greater than or equal to 40 mg/dL    LDL Cholesterol  Desirable:  <100mg/dL  Above Desirable:  100-129  mg/dL   Borderline High:  130-159 mg/dL   High:  160-189 mg/dL   Very High:  >= 190 mg/dL    Non HDL Cholesterol  Desirable:  130 mg/dL  Above Desirable:  130-159 mg/dL  Borderline High:  160-189 mg/dL  High:  190-219 mg/dL  Very High:  Greater than or equal to 220 mg/dL

## 2023-09-15 PROBLEM — E78.5 HYPERLIPIDEMIA LDL GOAL <70: Status: ACTIVE | Noted: 2023-09-15

## 2023-09-15 RX ORDER — ROSUVASTATIN CALCIUM 20 MG/1
20 TABLET, COATED ORAL DAILY
Qty: 90 TABLET | Refills: 3 | Status: SHIPPED | OUTPATIENT
Start: 2023-09-15 | End: 2024-07-15

## 2023-09-18 ENCOUNTER — TELEPHONE (OUTPATIENT)
Dept: FAMILY MEDICINE | Facility: CLINIC | Age: 38
End: 2023-09-18
Payer: MEDICARE

## 2023-09-18 NOTE — TELEPHONE ENCOUNTER
Call received from group home staff requesting recent lab results to be mailed out again. Results were printed and put in mail .  Monet Topete RN

## 2023-09-18 NOTE — LETTER
September 18, 2023      Isaiah Dominguez  [address] [facility] [id number]

## 2023-09-28 ENCOUNTER — TELEPHONE (OUTPATIENT)
Dept: FAMILY MEDICINE | Facility: CLINIC | Age: 38
End: 2023-09-28
Payer: MEDICARE

## 2023-09-28 NOTE — TELEPHONE ENCOUNTER
Cholesterol/ Medication Question Call    Contacts         Type Contact Phone/Fax    09/28/2023 03:05 PM CDT Phone (Incoming) April with [facility] ([facility]) [phone]          Reason for Call:  Cholesterol Medication  1) requesting when pt should return follow on Cholesterol to have it rechecked post starting medication.   2) Diet - recommendations regarding Dietary changes if pt chooses.   3) Cholesterol lab results   Fax [facility] - [phone]      Date of last appointment with provider: 9/14/23 GRUPO JOEL CNP    Okay to leave a detailed message?: Yes at Other phone number: [phone] April with [facility].  Arelis [employer] [facility] Sec

## 2023-09-29 NOTE — TELEPHONE ENCOUNTER
April from the group home is notified, she is read last lipid results from 9-14-23. Would like low fat/ low cholesterol diet information mailed. This is done.      LONNIE Parsons

## 2023-09-29 NOTE — TELEPHONE ENCOUNTER
Mercedes Donovan:    Please review note below, did you want patient to recheck cholesterol sooner than yearly check since starting new statin? Ok for low cholesterol diet?       Please advise, care team can send this dietary information out.       LONNIE Parsons

## 2023-09-29 NOTE — TELEPHONE ENCOUNTER
Ok for low cholesterol diet and Ok to recheck in one year at next annual visit.    Mercedes Donovan CNP

## 2023-10-27 DIAGNOSIS — K59.00 CONSTIPATION, UNSPECIFIED CONSTIPATION TYPE: ICD-10-CM

## 2023-10-27 NOTE — TELEPHONE ENCOUNTER
Per group home staff [facility] Isaiah has been having loose stools this week, frequency varies from day to day. It has not been every day. No N/V, abdominal pain or fever. He is doing all his normal activities.   Group home asking to change Gavilax to daily As Needed for constipation. Jett says they can HOLD the Gavilax over the weekend if the order does not get changed today.   Will need new Rx sent to [facility].  Samara HUNG RN

## 2023-10-30 RX ORDER — POLYETHYLENE GLYCOL 3350 17 G/17G
17 POWDER, FOR SOLUTION ORAL DAILY PRN
Qty: 510 G | Refills: 11 | Status: SHIPPED | OUTPATIENT
Start: 2023-10-30

## 2024-01-02 DIAGNOSIS — B35.1 ONYCHOMYCOSIS: ICD-10-CM

## 2024-01-04 RX ORDER — EFINACONAZOLE 100 MG/ML
SOLUTION TOPICAL
Qty: 8 ML | Refills: 11 | Status: SHIPPED | OUTPATIENT
Start: 2024-01-04

## 2024-01-11 NOTE — PROGRESS NOTES
"  Assessment & Plan     Constipation, unspecified constipation type  Complains of unable to move bowels for last 2 days.  No nausea, vomiting, diarrhea, abdominal pain or other relevant systemic symptoms.  Physical examination unremarkable.  Management options discussed.  Recommended well hydration, regular walks, increasing fiber in diet and MiraLAX prescribed.  Follow-up in 2 to 4 weeks or earlier if needed.  All questions answered.  - polyethylene glycol (MIRALAX) 17 GM/Dose powder; Take 17 g (1 capful) by mouth daily      Higinio Denson MD  [facility]    Rachel Colon Jr is a 37 year old accompanied by his staff, presenting for the following health issues:  Constipation      HPI     Constipation  Onset/Duration: 2 days   Description:  Frequency of bowel movements: usually daily   Consistency of stool: hard   Progression of Symptoms: same  Accompanying signs and symptoms:    Abdominal pain: No   Rectal pain: No   Blood in stool: No   Nausea/Vomiting: No   Weight loss or gain: No  History:   Similar problems in past: No  History of abdominal surgery: No  Chronic laxative use: No  New medications: No  Precipitating or alleviating factors:   Therapies tried and outcome: none       Review of Systems   Constitutional, HEENT, cardiovascular, pulmonary, gi and gu systems are negative, except as otherwise noted.      Objective    /80 (Cuff Size: Adult Regular)   Pulse 91   Temp 98.1  F (36.7  C) (Tympanic)   Resp 18   Ht 1.676 m (5' 6\")   Wt 78.5 kg (173 lb)   SpO2 96%   BMI 27.92 kg/m    Body mass index is 27.92 kg/m .  Physical Exam   GENERAL: alert and no distress  EYES: Eyes grossly normal to inspection, PERRL and conjunctivae and sclerae normal  HENT: normal cephalic/atraumatic, nose and mouth without ulcers or lesions, oropharynx clear and oral mucous membranes moist  NECK: no adenopathy, no asymmetry, masses, or scars and thyroid normal to palpation  RESP: lungs " clear to auscultation - no rales, rhonchi or wheezes  CV: regular rates and rhythm, normal S1 S2, no S3 or S4 and no murmur, click or rub  ABDOMEN: soft, nontender, no organomegaly  MS: no gross musculoskeletal defects noted, no edema                 0

## 2024-03-26 ENCOUNTER — TELEPHONE (OUTPATIENT)
Dept: WOUND CARE | Facility: CLINIC | Age: 39
End: 2024-03-26
Payer: MEDICARE

## 2024-03-26 NOTE — TELEPHONE ENCOUNTER
Retail Pharmacy Prior Authorization Team   Phone: [phone]    Note: Due to record-high volumes, our turn-around time is taking longer than usual . We are currently 10 business days behind in the pools.   We are working diligently to submit all requests in a timely manner and in the order they are received. Please only flag TRUE URGENT requests as high priority to the pool at this time.   If you have questions - please send a note/message in the active PA encounter and send back to the RPPA (Retail Pharmacy Prior Authorization) team [[id number]].    If you have more specific questions about our process please reach out to our supervisor Maricel Valladares.   Thank you!

## 2024-04-01 NOTE — TELEPHONE ENCOUNTER
Note: Due to record-high volumes, our turn-around time is taking longer than usual . We are currently 10 business days behind in the pools.   We are working diligently to submit all requests in a timely manner and in the order they are received. Please only flag TRUE URGENT requests as high priority to the pool at this time.   If you have questions - please send a note/message in the active PA encounter and send back to the RPPA (Retail Pharmacy Prior Authorization) team [[id number]].    If you have more specific questions about our process please reach out to our supervisor Maricel Valladares.   Thank you!

## 2024-04-08 NOTE — TELEPHONE ENCOUNTER
PA Initiation    Medication: JUBLIA 10 % EX SOLN  Insurance Company: [employer] Part D - Phone [phone] Fax [phone]  Pharmacy Filling the Rx: [facility] - [place], [place] - [address]  Filling Pharmacy Phone: [phone]  Filling Pharmacy Fax: [phone]  Start Date: 4/7/2024

## 2024-04-09 NOTE — TELEPHONE ENCOUNTER
Prior Authorization Approval    Medication: JUBLIA 10 % EX SOLN  Authorization Effective Date: 1/1/2024  Authorization Expiration Date: 4/8/2025  Approved Dose/Quantity:   Reference #:     Insurance Company: [employer] Part D - Phone [phone] Fax [phone]  Expected CoPay: $    CoPay Card Available:      Financial Assistance Needed:   Which Pharmacy is filling the prescription: [facility] - [place], [place] - [id number] [place] [address]  Pharmacy Notified: YES  Patient Notified: **Instructed pharmacy to notify patient when script is ready to /ship.**

## 2024-07-15 ENCOUNTER — OFFICE VISIT (OUTPATIENT)
Dept: FAMILY MEDICINE | Facility: CLINIC | Age: 39
End: 2024-07-15
Payer: MEDICARE

## 2024-07-15 ENCOUNTER — TELEPHONE (OUTPATIENT)
Dept: FAMILY MEDICINE | Facility: CLINIC | Age: 39
End: 2024-07-15

## 2024-07-15 VITALS
WEIGHT: 168 LBS | RESPIRATION RATE: 16 BRPM | BODY MASS INDEX: 27 KG/M2 | HEART RATE: 94 BPM | OXYGEN SATURATION: 99 % | HEIGHT: 66 IN | TEMPERATURE: 98.1 F | SYSTOLIC BLOOD PRESSURE: 118 MMHG | DIASTOLIC BLOOD PRESSURE: 74 MMHG

## 2024-07-15 DIAGNOSIS — E78.5 HYPERLIPIDEMIA LDL GOAL <70: ICD-10-CM

## 2024-07-15 DIAGNOSIS — Z00.00 ENCOUNTER FOR MEDICARE ANNUAL WELLNESS EXAM: Primary | ICD-10-CM

## 2024-07-15 DIAGNOSIS — F31.9 BIPOLAR 1 DISORDER (H): ICD-10-CM

## 2024-07-15 DIAGNOSIS — I10 BENIGN ESSENTIAL HYPERTENSION: ICD-10-CM

## 2024-07-15 DIAGNOSIS — D64.9 LOW HEMOGLOBIN: ICD-10-CM

## 2024-07-15 DIAGNOSIS — Z00.00 MEDICARE ANNUAL WELLNESS VISIT, SUBSEQUENT: ICD-10-CM

## 2024-07-15 LAB
ALBUMIN SERPL BCG-MCNC: 4.4 G/DL (ref 3.5–5.2)
ALP SERPL-CCNC: 105 U/L (ref 40–150)
ALT SERPL W P-5'-P-CCNC: 54 U/L (ref 0–70)
ANION GAP SERPL CALCULATED.3IONS-SCNC: 9 MMOL/L (ref 7–15)
AST SERPL W P-5'-P-CCNC: 33 U/L (ref 0–45)
BILIRUB SERPL-MCNC: 0.3 MG/DL
BUN SERPL-MCNC: 9 MG/DL (ref 6–20)
CALCIUM SERPL-MCNC: 9.3 MG/DL (ref 8.6–10)
CHLORIDE SERPL-SCNC: 104 MMOL/L (ref 98–107)
CHOLEST SERPL-MCNC: 105 MG/DL
CREAT SERPL-MCNC: 0.88 MG/DL (ref 0.67–1.17)
EGFRCR SERPLBLD CKD-EPI 2021: >90 ML/MIN/1.73M2
ERYTHROCYTE [DISTWIDTH] IN BLOOD BY AUTOMATED COUNT: 13.2 % (ref 10–15)
FASTING STATUS PATIENT QL REPORTED: YES
FASTING STATUS PATIENT QL REPORTED: YES
FERRITIN SERPL-MCNC: 289 NG/ML (ref 31–409)
GLUCOSE SERPL-MCNC: 105 MG/DL (ref 70–99)
HCO3 SERPL-SCNC: 26 MMOL/L (ref 22–29)
HCT VFR BLD AUTO: 39.6 % (ref 40–53)
HDLC SERPL-MCNC: 42 MG/DL
HGB BLD-MCNC: 12.7 G/DL (ref 13.3–17.7)
IRON BINDING CAPACITY (ROCHE): 263 UG/DL (ref 240–430)
IRON SATN MFR SERPL: 32 % (ref 15–46)
IRON SERPL-MCNC: 83 UG/DL (ref 61–157)
LDLC SERPL CALC-MCNC: 55 MG/DL
MCH RBC QN AUTO: 29.1 PG (ref 26.5–33)
MCHC RBC AUTO-ENTMCNC: 32.1 G/DL (ref 31.5–36.5)
MCV RBC AUTO: 91 FL (ref 78–100)
NONHDLC SERPL-MCNC: 63 MG/DL
PLATELET # BLD AUTO: 297 10E3/UL (ref 150–450)
POTASSIUM SERPL-SCNC: 4.5 MMOL/L (ref 3.4–5.3)
PROT SERPL-MCNC: 7.5 G/DL (ref 6.4–8.3)
RBC # BLD AUTO: 4.37 10E6/UL (ref 4.4–5.9)
SODIUM SERPL-SCNC: 139 MMOL/L (ref 135–145)
TRIGL SERPL-MCNC: 41 MG/DL
WBC # BLD AUTO: 7.7 10E3/UL (ref 4–11)

## 2024-07-15 PROCEDURE — 83550 IRON BINDING TEST: CPT | Performed by: NURSE PRACTITIONER

## 2024-07-15 PROCEDURE — 85027 COMPLETE CBC AUTOMATED: CPT | Performed by: NURSE PRACTITIONER

## 2024-07-15 PROCEDURE — 80061 LIPID PANEL: CPT | Performed by: NURSE PRACTITIONER

## 2024-07-15 PROCEDURE — 36415 COLL VENOUS BLD VENIPUNCTURE: CPT | Performed by: NURSE PRACTITIONER

## 2024-07-15 PROCEDURE — 99214 OFFICE O/P EST MOD 30 MIN: CPT | Mod: 25 | Performed by: NURSE PRACTITIONER

## 2024-07-15 PROCEDURE — 80053 COMPREHEN METABOLIC PANEL: CPT | Performed by: NURSE PRACTITIONER

## 2024-07-15 PROCEDURE — G0439 PPPS, SUBSEQ VISIT: HCPCS | Performed by: NURSE PRACTITIONER

## 2024-07-15 PROCEDURE — 83540 ASSAY OF IRON: CPT | Performed by: NURSE PRACTITIONER

## 2024-07-15 PROCEDURE — 82728 ASSAY OF FERRITIN: CPT | Performed by: NURSE PRACTITIONER

## 2024-07-15 RX ORDER — QUETIAPINE FUMARATE 200 MG/1
200 TABLET, FILM COATED ORAL EVERY MORNING
Qty: 90 TABLET | Refills: 3 | Status: SHIPPED | OUTPATIENT
Start: 2024-07-15

## 2024-07-15 RX ORDER — ROSUVASTATIN CALCIUM 20 MG/1
20 TABLET, COATED ORAL DAILY
Qty: 90 TABLET | Refills: 3 | Status: SHIPPED | OUTPATIENT
Start: 2024-07-15

## 2024-07-15 RX ORDER — LISINOPRIL 20 MG/1
TABLET ORAL
Qty: 90 TABLET | Refills: 3 | Status: SHIPPED | OUTPATIENT
Start: 2024-07-15

## 2024-07-15 RX ORDER — QUETIAPINE FUMARATE 25 MG/1
25 TABLET, FILM COATED ORAL 2 TIMES DAILY
Qty: 90 TABLET | Refills: 3 | Status: SHIPPED | OUTPATIENT
Start: 2024-07-15 | End: 2024-07-16

## 2024-07-15 SDOH — HEALTH STABILITY: PHYSICAL HEALTH: ON AVERAGE, HOW MANY MINUTES DO YOU ENGAGE IN EXERCISE AT THIS LEVEL?: 20 MIN

## 2024-07-15 SDOH — HEALTH STABILITY: PHYSICAL HEALTH: ON AVERAGE, HOW MANY DAYS PER WEEK DO YOU ENGAGE IN MODERATE TO STRENUOUS EXERCISE (LIKE A BRISK WALK)?: 1 DAY

## 2024-07-15 ASSESSMENT — SOCIAL DETERMINANTS OF HEALTH (SDOH): HOW OFTEN DO YOU GET TOGETHER WITH FRIENDS OR RELATIVES?: ONCE A WEEK

## 2024-07-15 ASSESSMENT — PAIN SCALES - GENERAL: PAINLEVEL: NO PAIN (0)

## 2024-07-15 NOTE — TELEPHONE ENCOUNTER
Call received from Pharmacy. Needing administration times clarified for patients quetiapine.     States before patient was taking 25MG of quetiapine at 4PM and the 200MG at bed time. Patient has a dose increase to take the 25MG twice daily. When should the second 25MG be taken? Please send new RX with directions to the pharmacy       Preferred Pharmacy:   [facility] - [place], [place] - [address]  [address]  [facility] [address]  Phone: [phone] Fax: [phone]

## 2024-07-15 NOTE — PATIENT INSTRUCTIONS
Patient Education   Preventive Care Advice   This is general advice given by our system to help you stay healthy. However, your care team may have specific advice just for you. Please talk to your care team about your preventive care needs.  Nutrition  Eat 5 or more servings of fruits and vegetables each day.  Try wheat bread, brown rice and whole grain pasta (instead of white bread, rice, and pasta).  Get enough calcium and vitamin D. Check the label on foods and aim for 100% of the RDA (recommended daily allowance).  Lifestyle  Exercise at least 150 minutes each week  (30 minutes a day, 5 days a week).  Do muscle strengthening activities 2 days a week. These help control your weight and prevent disease.  No smoking.  Wear sunscreen to prevent skin cancer.  Have a dental exam and cleaning every 6 months.  Yearly exams  See your health care team every year to talk about:  Any changes in your health.  Any medicines your care team has prescribed.  Preventive care, family planning, and ways to prevent chronic diseases.  Shots (vaccines)   HPV shots (up to age 26), if you've never had them before.  Hepatitis B shots (up to age 59), if you've never had them before.  COVID-19 shot: Get this shot when it's due.  Flu shot: Get a flu shot every year.  Tetanus shot: Get a tetanus shot every 10 years.  Pneumococcal, hepatitis A, and RSV shots: Ask your care team if you need these based on your risk.  Shingles shot (for age 50 and up)  General health tests  Diabetes screening:  Starting at age 35, Get screened for diabetes at least every 3 years.  If you are younger than age 35, ask your care team if you should be screened for diabetes.  Cholesterol test: At age 39, start having a cholesterol test every 5 years, or more often if advised.  Bone density scan (DEXA): At age 50, ask your care team if you should have this scan for osteoporosis (brittle bones).  Hepatitis C: Get tested at least once in your life.  STIs (sexually  transmitted infections)  Before age 24: Ask your care team if you should be screened for STIs.  After age 24: Get screened for STIs if you're at risk. You are at risk for STIs (including HIV) if:  You are sexually active with more than one person.  You don't use condoms every time.  You or a partner was diagnosed with a sexually transmitted infection.  If you are at risk for HIV, ask about PrEP medicine to prevent HIV.  Get tested for HIV at least once in your life, whether you are at risk for HIV or not.  Cancer screening tests  Cervical cancer screening: If you have a cervix, begin getting regular cervical cancer screening tests starting at age 21.  Breast cancer scan (mammogram): If you've ever had breasts, begin having regular mammograms starting at age 40. This is a scan to check for breast cancer.  Colon cancer screening: It is important to start screening for colon cancer at age 45.  Have a colonoscopy test every 10 years (or more often if you're at risk) Or, ask your provider about stool tests like a FIT test every year or Cologuard test every 3 years.  To learn more about your testing options, visit:   .  For help making a decision, visit:   https://bit.ly/bb08613.  Prostate cancer screening test: If you have a prostate, ask your care team if a prostate cancer screening test (PSA) at age 55 is right for you.  Lung cancer screening: If you are a current or former smoker ages 50 to 80, ask your care team if ongoing lung cancer screenings are right for you.  For informational purposes only. Not to replace the advice of your health care provider. Copyright   2023 [facility]. All rights reserved. Clinically reviewed by the [facility]. [id number] - REV 01/24.  Preventing Falls: Care Instructions  Injuries and health problems such as trouble walking or poor eyesight can increase your risk of falling. So can some medicines. But there are things you can do to help  "prevent falls. You can exercise to get stronger. You can also arrange your home to make it safer.    Talk to your doctor about the medicines you take. Ask if any of them increase the risk of falls and whether they can be changed or stopped.   Try to exercise regularly. It can help improve your strength and balance. This can help lower your risk of falling.     Practice fall safety and prevention.    Wear low-heeled shoes that fit well and give your feet good support. Talk to your doctor if you have foot problems that make this hard.  Carry a cellphone or wear a medical alert device that you can use to call for help.  Use stepladders instead of chairs to reach high objects. Don't climb if you're at risk for falls. Ask for help, if needed.  Wear the correct eyeglasses, if you need them.    Make your home safer.    Remove rugs, cords, clutter, and furniture from walkways.  Keep your house well lit. Use night-lights in hallways and bathrooms.  Install and use sturdy handrails on stairways.  Wear nonskid footwear, even inside. Don't walk barefoot or in socks without shoes.    Be safe outside.    Use handrails, curb cuts, and ramps whenever possible.  Keep your hands free by using a shoulder bag or backpack.  Try to walk in well-lit areas. Watch out for uneven ground, changes in pavement, and debris.  Be careful in the winter. Walk on the grass or gravel when sidewalks are slippery. Use de-icer on steps and walkways. Add non-slip devices to shoes.    Put grab bars and nonskid mats in your shower or tub and near the toilet. Try to use a shower chair or bath bench when bathing.   Get into a tub or shower by putting in your weaker leg first. Get out with your strong side first. Have a phone or medical alert device in the bathroom with you.   Where can you learn more?  Go to https://www.[employer].net/patiented  Enter G117 in the search box to learn more about \"Preventing Falls: Care Instructions.\"  Current as of: July 17, " 2023               Content Version: 14.0    2515-2457 [employer].   Care instructions adapted under license by your healthcare professional. If you have questions about a medical condition or this instruction, always ask your healthcare professional. [employer] disclaims any warranty or liability for your use of this information.      Hearing Loss: Care Instructions  Overview     Hearing loss is a sudden or slow decrease in how well you hear. It can range from slight to profound. Permanent hearing loss can occur with aging. It also can happen when you are exposed long-term to loud noise. Examples include listening to loud music, riding motorcycles, or being around other loud machines.  Hearing loss can affect your work and home life. It can make you feel lonely or depressed. You may feel that you have lost your independence. But hearing aids and other devices can help you hear better and feel connected to others.  Follow-up care is a key part of your treatment and safety. Be sure to make and go to all appointments, and call your doctor if you are having problems. It's also a good idea to know your test results and keep a list of the medicines you take.  How can you care for yourself at home?  Avoid loud noises whenever possible. This helps keep your hearing from getting worse.  Always wear hearing protection around loud noises.  Wear a hearing aid as directed.  A professional can help you pick a hearing aid that will work best for you.  You can also get hearing aids over the counter for mild to moderate hearing loss.  Have hearing tests as your doctor suggests. They can show whether your hearing has changed. Your hearing aid may need to be adjusted.  Use other devices as needed. These may include:  Telephone amplifiers and hearing aids that can connect to a television, stereo, radio, or microphone.  Devices that use lights or vibrations. These alert you to the doorbell, a ringing  "telephone, or a baby monitor.  Television closed-captioning. This shows the words at the bottom of the screen. Most new TVs can do this.  TTY (text telephone). This lets you type messages back and forth on the telephone instead of talking or listening. These devices are also called TDD. When messages are typed on the keyboard, they are sent over the phone line to a receiving TTY. The message is shown on a monitor.  Use text messaging, social media, and email if it is hard for you to communicate by telephone.  Try to learn a listening technique called speechreading. It is not lipreading. You pay attention to people's gestures, expressions, posture, and tone of voice. These clues can help you understand what a person is saying. Face the person you are talking to, and have them face you. Make sure the lighting is good. You need to see the other person's face clearly.  Think about counseling if you need help to adjust to your hearing loss.  When should you call for help?  Watch closely for changes in your health, and be sure to contact your doctor if:    You think your hearing is getting worse.     You have new symptoms, such as dizziness or nausea.   Where can you learn more?  Go to https://www.[employer].net/patiented  Enter R798 in the search box to learn more about \"Hearing Loss: Care Instructions.\"  Current as of: September 27, 2023               Content Version: 14.0    3182-2554 [employer].   Care instructions adapted under license by your healthcare professional. If you have questions about a medical condition or this instruction, always ask your healthcare professional. [employer] disclaims any warranty or liability for your use of this information.      Learning About Sleeping Well  What does sleeping well mean?     Sleeping well means getting enough sleep to feel good and stay healthy. How much sleep is enough varies among people.  The number of hours you sleep and how you feel " when you wake up are both important. If you do not feel refreshed, you probably need more sleep. Another sign of not getting enough sleep is feeling tired during the day.  Experts recommend that adults get at least 7 or more hours of sleep per day. Children and older adults need more sleep.  Why is getting enough sleep important?  Getting enough quality sleep is a basic part of good health. When your sleep suffers, your physical health, mood, and your thoughts can suffer too. You may find yourself feeling more grumpy or stressed. Not getting enough sleep also can lead to serious problems, including injury, accidents, anxiety, and depression.  What might cause poor sleeping?  Many things can cause sleep problems, including:  Changes to your sleep schedule.  Stress. Stress can be caused by fear about a single event, such as giving a speech. Or you may have ongoing stress, such as worry about work or school.  Depression, anxiety, and other mental or emotional conditions.  Changes in your sleep habits or surroundings. This includes changes that happen where you sleep, such as noise, light, or sleeping in a different bed. It also includes changes in your sleep pattern, such as having jet lag or working a late shift.  Health problems, such as pain, breathing problems, and restless legs syndrome.  Lack of regular exercise.  Using alcohol, nicotine, or caffeine before bed.  How can you help yourself?  Here are some tips that may help you sleep more soundly and wake up feeling more refreshed.  Your sleeping area   Use your bedroom only for sleeping and sex. A bit of light reading may help you fall asleep. But if it doesn't, do your reading elsewhere in the house. Try not to use your TV, computer, smartphone, or tablet while you are in bed.  Be sure your bed is big enough to stretch out comfortably, especially if you have a sleep partner.  Keep your bedroom quiet, dark, and cool. Use curtains, blinds, or a sleep mask to block  "out light. To block out noise, use earplugs, soothing music, or a \"white noise\" machine.  Your evening and bedtime routine   Create a relaxing bedtime routine. You might want to take a warm shower or bath, or listen to soothing music.  Go to bed at the same time every night. And get up at the same time every morning, even if you feel tired.  What to avoid   Limit caffeine (coffee, tea, caffeinated sodas) during the day, and don't have any for at least 6 hours before bedtime.  Avoid drinking alcohol before bedtime. Alcohol can cause you to wake up more often during the night.  Try not to smoke or use tobacco, especially in the evening. Nicotine can keep you awake.  Limit naps during the day, especially close to bedtime.  Avoid lying in bed awake for too long. If you can't fall asleep or if you wake up in the middle of the night and can't get back to sleep within about 20 minutes, get out of bed and go to another room until you feel sleepy.  Avoid taking medicine right before bed that may keep you awake or make you feel hyper or energized. Your doctor can tell you if your medicine may do this and if you can take it earlier in the day.  If you can't sleep   Imagine yourself in a peaceful, pleasant scene. Focus on the details and feelings of being in a place that is relaxing.  Get up and do a quiet or boring activity until you feel sleepy.  Avoid drinking any liquids before going to bed to help prevent waking up often to use the bathroom.  Where can you learn more?  Go to https://www.[employer].net/patiented  Enter J942 in the search box to learn more about \"Learning About Sleeping Well.\"  Current as of: July 10, 2023               Content Version: 14.0    8681-5529 [employer].   Care instructions adapted under license by your healthcare professional. If you have questions about a medical condition or this instruction, always ask your healthcare professional. [employer] disclaims any warranty " or liability for your use of this information.

## 2024-07-15 NOTE — PROGRESS NOTES
"Preventive Care Visit  [facility]  MARYCRUZ Calhoun CNP, Family Medicine  Jul 15, 2024      Assessment & Plan   Problem List Items Addressed This Visit          Endocrine    Hyperlipidemia LDL goal <70    Relevant Medications    rosuvastatin (CRESTOR) 20 MG tablet       Circulatory    Benign essential hypertension    Relevant Medications    lisinopril (ZESTRIL) 20 MG tablet     Other Visit Diagnoses       Encounter for Medicare annual wellness exam    -  Primary    Relevant Orders    REVIEW OF HEALTH MAINTENANCE PROTOCOL ORDERS (Completed)    CBC with platelets    Comprehensive metabolic panel (BMP + Alb, Alk Phos, ALT, AST, Total. Bili, TP)    Lipid panel reflex to direct LDL Fasting    Medicare annual wellness visit, subsequent        Relevant Medications    lisinopril (ZESTRIL) 20 MG tablet    Bipolar 1 disorder (H)        Relevant Medications    QUEtiapine (SEROQUEL) 200 MG tablet    QUEtiapine (SEROQUEL) 25 MG tablet           Reviewed with patient patient does not state that he has problems hearing and does not want a hearing test at this time.  Patient has been advised of split billing requirements and indicates understanding: Yes       BMI  Estimated body mass index is 27.12 kg/m  as calculated from the following:    Height as of this encounter: 1.676 m (5' 6\").    Weight as of this encounter: 76.2 kg (168 lb).   Weight management plan: Discussed healthy diet and exercise guidelines    Counseling  Appropriate preventive services were discussed with this patient, including applicable screening as appropriate for fall prevention, nutrition, physical activity, Tobacco-use cessation, weight loss and cognition.  Checklist reviewing preventive services available has been given to the patient.  Reviewed patient's diet, addressing concerns and/or questions.   He is at risk for lack of exercise and has been provided with information to increase physical activity for the benefit of his " well-being.   Discussed possible causes of fatigue. The patient was provided with written information regarding signs of hearing loss.     See Patient Instructions      Subjective   Isaiah Ferguson is a 38 year old, presenting for the following:  Physical, Hypertension, Mental Health Problem, and Lipids        7/15/2024     9:02 AM   Additional Questions   Roomed by MAURO Perea     Health Care Directive  Patient does not have a Health Care Directive or Living Will: Discussed advance care planning with patient; however, patient declined at this time.    HPI      Hyperlipidemia Follow-Up    Are you regularly taking any medication or supplement to lower your cholesterol?   Yes- rosuvastatin   Are you having muscle aches or other side effects that you think could be caused by your cholesterol lowering medication?  No    Hypertension Follow-up    Do you check your blood pressure regularly outside of the clinic? Yes   Are you following a low salt diet? Yes  Are your blood pressures ever more than 140 on the top number (systolic) OR more   than 90 on the bottom number (diastolic), for example 140/90? No    Mental Health   How are you doing with your Mental Health since your last visit? No change  Are you having other symptoms that might be associated with Mental Health? No  Have you had a significant life event?  No   Do you have any concerns with your use of alcohol or other drugs? No    Social History     Tobacco Use    Smoking status: Never     Passive exposure: Yes    Smokeless tobacco: Never    Tobacco comments:     stepfather in the house   Vaping Use    Vaping status: Never Used   Substance Use Topics    Alcohol use: No    Drug use: No         7/13/2023     1:11 PM   PHQ   PHQ-9 Total Score 0   Q9: Thoughts of better off dead/self-harm past 2 weeks Not at all          No data to display                  7/13/2023     1:11 PM   Last PHQ-9   1.  Little interest or pleasure in doing things 0   2.  Feeling down, depressed, or  hopeless 0   3.  Trouble falling or staying asleep, or sleeping too much 0   4.  Feeling tired or having little energy 0   5.  Poor appetite or overeating 0   6.  Feeling bad about yourself 0   7.  Trouble concentrating 0   8.  Moving slowly or restless 0   Q9: Thoughts of better off dead/self-harm past 2 weeks 0   PHQ-9 Total Score 0       Suicide Assessment Five-step Evaluation and Treatment (SAFE-T)          7/15/2024   General Health   How would you rate your overall physical health? Good   Feel stress (tense, anxious, or unable to sleep) Not at all            7/15/2024   Nutrition   Diet: Low salt            7/15/2024   Exercise   Days per week of moderate/strenous exercise 1 day   Average minutes spent exercising at this level 20 min      (!) EXERCISE CONCERN      7/15/2024   Social Factors   Frequency of gathering with friends or relatives Once a week   Worry food won't last until get money to buy more No   Food not last or not have enough money for food? No   Do you have housing? (Housing is defined as stable permanent housing and does not include staying ouside in a car, in a tent, in an abandoned building, in an overnight shelter, or couch-surfing.) No   Are you worried about losing your housing? No   Lack of transportation? No   Unable to get utilities (heat,electricity)? No   Want help with housing or utility concern? No      (!) HOUSING CONCERN PRESENT      7/15/2024   Fall Risk   Fallen 2 or more times in the past year? Yes   Trouble with walking or balance? No              7/15/2024   Activities of Daily Living- Home Safety   Needs help with the following daily activites None of the above   Safety concerns in the home None of the above            7/15/2024   Dental   Dentist two times every year? Yes            7/15/2024   Hearing Screening   Hearing concerns? (!) I FEEL THAT PEOPLE ARE MUMBLING OR NOT SPEAKING CLEARLY.    None of the above       Multiple values from one day are sorted in  reverse-chronological order         7/15/2024   Driving Risk Screening   Patient/family members have concerns about driving No            7/15/2024   General Alertness/Fatigue Screening   Have you been more tired than usual lately? (!) YES            7/15/2024   Urinary Incontinence Screening   Bothered by leaking urine in past 6 months No            7/15/2024   TB Screening   Were you born outside of the [place]? Yes            Today's PHQ-2 Score:       7/15/2024     9:01 AM   PHQ-2 ( 1999 [employer])   Q1: Little interest or pleasure in doing things 0   Q2: Feeling down, depressed or hopeless 0   PHQ-2 Score 0   Q1: Little interest or pleasure in doing things Not at all   Q2: Feeling down, depressed or hopeless Not at all   PHQ-2 Score 0           7/15/2024   Substance Use   Alcohol more than 3/day or more than 7/wk No   Do you have a current opioid prescription? No   How severe/bad is pain from 1 to 10? 0/10 (No Pain)   Do you use any other substances recreationally? No        Social History     Tobacco Use    Smoking status: Never     Passive exposure: Yes    Smokeless tobacco: Never    Tobacco comments:     stepfather in the house   Vaping Use    Vaping status: Never Used   Substance Use Topics    Alcohol use: No    Drug use: No             7/15/2024   Contraception/Family Planning   Questions about contraception or family planning (!) NO            Reviewed and updated as needed this visit by Provider                    Labs reviewed in EPIC  BP Readings from Last 3 Encounters:   07/15/24 118/74   09/14/23 135/86   07/13/23 118/80    Wt Readings from Last 3 Encounters:   07/15/24 76.2 kg (168 lb)   09/14/23 78.5 kg (173 lb)   07/13/23 78 kg (172 lb)                  Patient Active Problem List   Diagnosis    Attention deficit hyperactivity disorder (ADHD)    Pituitary dwarfism (H24)    CARDIOVASCULAR SCREENING; LDL GOAL LESS THAN 160    Auditory hallucinations    MR (mental retardation), moderate    Benign essential  hypertension    Fall, initial encounter    Closed fracture of proximal end of right fibula, unspecified fracture morphology, initial encounter    Closed fracture of distal end of right tibia, unspecified fracture morphology, initial encounter    Pain in joint, ankle and foot, right    Hyperlipidemia LDL goal <70     Past Surgical History:   Procedure Laterality Date    OPEN REDUCTION INTERNAL FIXATION HIP NAILING Right 12/20/2021    Procedure: Right tibial nail insertion;  Surgeon: Saleem Painter MD;  Location: [place] OR    [facility] NONSPECIFIC PROCEDURE      S/P bilateral PE tubes       Social History     Tobacco Use    Smoking status: Never     Passive exposure: Yes    Smokeless tobacco: Never    Tobacco comments:     stepfather in the house   Substance Use Topics    Alcohol use: No     Family History   Problem Relation Age of Onset    Unknown/Adopted No family hx of     Asthma No family hx of     C.A.D. No family hx of     Diabetes No family hx of     Hypertension No family hx of          Current Outpatient Medications   Medication Sig Dispense Refill    ACNE MEDICATION 5 5 % topical gel APPLY TOPICALLY ONCE DAILY *1 TOTAL FILL* **NON-COVERED MED** 42.5 g 10    atomoxetine (STRATTERA) 25 MG capsule Take 25 mg by mouth daily      ciclopirox (PENLAC) 8 % external solution APPLY TO ADJACENT SKIN & AFFECTED NAILS ONCE DAILY. REMOVE WITH ALCOHOL EVERY 7 DAYS & REPEAT **NON-COVERED MED** 6.6 mL 11    diphenhydrAMINE (BENADRYL) 25 MG tablet Take 1 tablet (25 mg) by mouth every 8 hours as needed for itching or allergies 60 tablet 1    JUBLIA 10 % SOLN APPLY TO AFFECTED AREA(S) TOPICALLY ONCE DAILY. 8 mL 11    LAMICTAL 100 MG OR TABS 1 TABLET TWICE DAILY 60 0    lisinopril (ZESTRIL) 20 MG tablet TAKE 1 TABLET BY MOUTH ONCE DAILY 90 tablet 3    olopatadine (PATADAY) 0.2 % ophthalmic solution 1 drop daily      OMEGA 3 1000 MG OR CAPS 2 CAPSULES ORALLY TWICE DAILY      polyethylene glycol (GAVILAX) 17 GM/Dose powder Take  17 g by mouth daily as needed for constipation 510 g 11    QUEtiapine (SEROQUEL) 200 MG tablet Take 1 tablet (200 mg) by mouth every morning 90 tablet 3    QUEtiapine (SEROQUEL) 25 MG tablet Take 1 tablet (25 mg) by mouth 2 times daily 90 tablet 3    rosuvastatin (CRESTOR) 20 MG tablet Take 1 tablet (20 mg) by mouth daily 90 tablet 3     Allergies   Allergen Reactions    No Known Allergies      Recent Labs   Lab Test 09/14/23  1654 07/13/23  1355 06/27/22  0944 12/20/21  0223 02/25/21  1538 06/22/19  0940 02/23/17  1559   A1C  --   --  5.7*  --   --   --   --    * 195*  --   --   --  171*  --    HDL 39* 43  --   --   --  42  --    TRIG 126 82  --   --   --  82  --    ALT 51 36 47  --   --   --   --    CR 0.90 0.84 0.80   < > 0.74  --  0.77   GFRESTIMATED >90 >90 >90   < > >90  --  >90  Non  GFR Calc     GFRESTBLACK  --   --   --   --  >90  --  >90  African American GFR Calc     POTASSIUM 4.3 4.4 4.7   < > 3.9  --  4.6    < > = values in this interval not displayed.      Current providers sharing in care for this patient include:  Patient Care Team:  Mercedes Donovan APRN CNP as PCP - General (Family Medicine)  Mercedes Donovan APRN CNP as Assigned PCP    The following health maintenance items are reviewed in Epic and correct as of today:  Health Maintenance   Topic Date Due    COVID-19 Vaccine (5 - 2023-24 season) 09/01/2023    ANNUAL REVIEW OF HM ORDERS  07/13/2024    MEDICARE ANNUAL WELLNESS VISIT  07/13/2024    INFLUENZA VACCINE (1) 09/01/2024    LIPID  09/14/2024    GLUCOSE  09/14/2026    ADVANCE CARE PLANNING  07/13/2028    DTAP/TDAP/TD IMMUNIZATION (9 - Td or Tdap) 06/04/2029    HEPATITIS C SCREENING  Completed    HIV SCREENING  Completed    PHQ-2 (once per calendar year)  Completed    IPV IMMUNIZATION  Completed    HEPATITIS B IMMUNIZATION  Completed    Pneumococcal Vaccine: Pediatrics (0 to 5 Years) and At-Risk Patients (6 to 64 Years)  Aged Out    HPV IMMUNIZATION  Aged Out     "MENINGITIS IMMUNIZATION  Aged Out    RSV MONOCLONAL ANTIBODY  Aged Out         Review of Systems  Constitutional, HEENT, cardiovascular, pulmonary, gi and gu systems are negative, except as otherwise noted.     Objective    Exam  /74 (Cuff Size: Adult Regular)   Pulse 94   Temp 98.1  F (36.7  C) (Tympanic)   Resp 16   Ht 1.676 m (5' 6\")   Wt 76.2 kg (168 lb)   SpO2 99%   BMI 27.12 kg/m     Estimated body mass index is 27.94 kg/m  as calculated from the following:    Height as of 9/14/23: 1.676 m (5' 5.98\").    Weight as of 9/14/23: 78.5 kg (173 lb).    Physical Exam  GENERAL: alert and no distress  NECK: no adenopathy, no asymmetry, masses, or scars  RESP: lungs clear to auscultation - no rales, rhonchi or wheezes  CV: regular rate and rhythm, normal S1 S2, no S3 or S4, no murmur, click or rub, no peripheral edema  ABDOMEN: soft, nontender, no hepatosplenomegaly, no masses and bowel sounds normal  MS: no gross musculoskeletal defects noted, no edema        7/15/2024   Mini Cog   Mini-Cog Not Completed (choose reason) Mental handicap                 Signed Electronically by: MARYCRUZ Calhoun CNP    "

## 2024-07-15 NOTE — LETTER
July 17, 2024      Isaiah Dominguez  [address] [address] [facility] [id number]        Dear ,    We are writing to inform you of your test results.    CBC was mildly low at 12.7 normal is 13 and iron levels are all within normal limits no further intervention at this time.  Cholesterol numbers are all within normal limits.  And comprehensive panel was within normal limits with normal kidney and liver functions.     Resulted Orders   Lipid panel reflex to direct LDL Fasting   Result Value Ref Range    Cholesterol 105 <200 mg/dL    Triglycerides 41 <150 mg/dL    Direct Measure HDL 42 >=40 mg/dL    LDL Cholesterol Calculated 55 <=100 mg/dL    Non HDL Cholesterol 63 <130 mg/dL    Patient Fasting > 8hrs? Yes     Narrative    Cholesterol  Desirable:  <200 mg/dL    Triglycerides  Normal:  Less than 150 mg/dL  Borderline High:  150-199 mg/dL  High:  200-499 mg/dL  Very High:  Greater than or equal to 500 mg/dL    Direct Measure HDL  Female:  Greater than or equal to 50 mg/dL   Male:  Greater than or equal to 40 mg/dL    LDL Cholesterol  Desirable:  <100mg/dL  Above Desirable:  100-129 mg/dL   Borderline High:  130-159 mg/dL   High:  160-189 mg/dL   Very High:  >= 190 mg/dL    Non HDL Cholesterol  Desirable:  130 mg/dL  Above Desirable:  130-159 mg/dL  Borderline High:  160-189 mg/dL  High:  190-219 mg/dL  Very High:  Greater than or equal to 220 mg/dL   Comprehensive metabolic panel (BMP + Alb, Alk Phos, ALT, AST, Total. Bili, TP)   Result Value Ref Range    Sodium 139 135 - 145 mmol/L    Potassium 4.5 3.4 - 5.3 mmol/L    Carbon Dioxide (CO2) 26 22 - 29 mmol/L    Anion Gap 9 7 - 15 mmol/L    Urea Nitrogen 9.0 6.0 - 20.0 mg/dL    Creatinine 0.88 0.67 - 1.17 mg/dL    GFR Estimate >90 >60 mL/min/1.73m2      Comment:      eGFR calculated using 2021 CKD-EPI equation.    Calcium 9.3 8.6 - 10.0 mg/dL    Chloride 104 98 - 107 mmol/L    Glucose 105 (H) 70 - 99 mg/dL    Alkaline Phosphatase 105 40 - 150 U/L    AST 33 0 -  45 U/L      Comment:      Reference intervals for this test were updated on 6/12/2023 to more accurately reflect our healthy population. There may be differences in the flagging of prior results with similar values performed with this method. Interpretation of those prior results can be made in the context of the updated reference intervals.    ALT 54 0 - 70 U/L      Comment:      Reference intervals for this test were updated on 6/12/2023 to more accurately reflect our healthy population. There may be differences in the flagging of prior results with similar values performed with this method. Interpretation of those prior results can be made in the context of the updated reference intervals.      Protein Total 7.5 6.4 - 8.3 g/dL    Albumin 4.4 3.5 - 5.2 g/dL    Bilirubin Total 0.3 <=1.2 mg/dL    Patient Fasting > 8hrs? Yes    CBC with platelets   Result Value Ref Range    WBC Count 7.7 4.0 - 11.0 10e3/uL    RBC Count 4.37 (L) 4.40 - 5.90 10e6/uL    Hemoglobin 12.7 (L) 13.3 - 17.7 g/dL    Hematocrit 39.6 (L) 40.0 - 53.0 %    MCV 91 78 - 100 fL    MCH 29.1 26.5 - 33.0 pg    MCHC 32.1 31.5 - 36.5 g/dL    RDW 13.2 10.0 - 15.0 %    Platelet Count 297 150 - 450 10e3/uL   Iron and iron binding capacity   Result Value Ref Range    Iron 83 61 - 157 ug/dL    Iron Binding Capacity 263 240 - 430 ug/dL    Iron Sat Index 32 15 - 46 %   Ferritin   Result Value Ref Range    Ferritin 289 31 - 409 ng/mL       If you have any questions or concerns, please call the clinic at the number listed above.       Sincerely,      MARYCRUZ Calhoun CNP

## 2024-07-16 RX ORDER — QUETIAPINE FUMARATE 25 MG/1
25 TABLET, FILM COATED ORAL AT BEDTIME
Qty: 90 TABLET | Refills: 3 | Status: SHIPPED | OUTPATIENT
Start: 2024-07-16

## 2024-07-16 NOTE — TELEPHONE ENCOUNTER
Clarified Seroquel 25 mg to be taken at at bedtime for sleep 200 mg in the morning.    Mercedes Donovan CNP

## 2024-07-16 NOTE — TELEPHONE ENCOUNTER
Pharmacy called looking to clarify dosing of quetiapine:       Disp Refills Start End DANIELA   QUEtiapine (SEROQUEL) 200 MG tablet 90 tablet 3 7/15/2024 -- No   Sig - Route: Take 1 tablet (200 mg) by mouth every morning - Oral   Sent to pharmacy as: QUEtiapine Fumarate 200 MG Oral Tablet (SEROquel)     Should med be given in the morning, or at bedtime?      Disp Refills Start End DANIELA   QUEtiapine (SEROQUEL) 25 MG tablet 90 tablet 3 7/15/2024 -- No   Sig - Route: Take 1 tablet (25 mg) by mouth 2 times daily - Oral     Pharmacy is looking clarify dosing times;  should be given previous order were at 4 pm and what other time? Also concerned about qty if BID dosing? (180 for 90 day supply)        Lashon HUTCHINS  Station

## 2025-06-17 ENCOUNTER — OFFICE VISIT (OUTPATIENT)
Dept: URGENT CARE | Facility: URGENT CARE | Age: 40
End: 2025-06-17
Payer: MEDICARE

## 2025-06-17 ENCOUNTER — TELEPHONE (OUTPATIENT)
Dept: URGENT CARE | Facility: URGENT CARE | Age: 40
End: 2025-06-17

## 2025-06-17 VITALS
TEMPERATURE: 97.7 F | SYSTOLIC BLOOD PRESSURE: 127 MMHG | WEIGHT: 175 LBS | OXYGEN SATURATION: 98 % | BODY MASS INDEX: 28.12 KG/M2 | RESPIRATION RATE: 16 BRPM | DIASTOLIC BLOOD PRESSURE: 81 MMHG | HEART RATE: 74 BPM | HEIGHT: 66 IN

## 2025-06-17 DIAGNOSIS — R21 RASH: Primary | ICD-10-CM

## 2025-06-17 PROCEDURE — 99214 OFFICE O/P EST MOD 30 MIN: CPT | Performed by: PHYSICIAN ASSISTANT

## 2025-06-17 PROCEDURE — 3074F SYST BP LT 130 MM HG: CPT | Performed by: PHYSICIAN ASSISTANT

## 2025-06-17 PROCEDURE — 3079F DIAST BP 80-89 MM HG: CPT | Performed by: PHYSICIAN ASSISTANT

## 2025-06-17 RX ORDER — DOXYCYCLINE 100 MG/1
100 CAPSULE ORAL 2 TIMES DAILY
Qty: 20 CAPSULE | Refills: 0 | Status: SHIPPED | OUTPATIENT
Start: 2025-06-17 | End: 2025-06-27

## 2025-06-17 RX ORDER — KETOCONAZOLE 20 MG/G
CREAM TOPICAL 2 TIMES DAILY
Qty: 60 G | Refills: 0 | Status: SHIPPED | OUTPATIENT
Start: 2025-06-17 | End: 2025-07-01

## 2025-06-17 NOTE — PROGRESS NOTES
"  Assessment & Plan     Rash  Erythematous circular rash that has resemblance of a bullseye rash with staff reporting tick bites in the house. Will treat with doxycyline x 10 days. Also discussed the possibility of ringworm which will develop a raised, scaly edge; if that develops they will begin ketoconazole x 14 days. Discussed with staff and also called mom to discuss plan.     - doxycycline monohydrate (MONODOX) 100 MG capsule; Take 1 capsule (100 mg) by mouth 2 times daily for 10 days.  - ketoconazole (NIZORAL) 2 % external cream; Apply topically 2 times daily for 14 days.          BMI  Estimated body mass index is 28.12 kg/m  as calculated from the following:    Height as of this encounter: 1.68 m (5' 6.14\").    Weight as of this encounter: 79.4 kg (175 lb).                   30 minutes spent by me on the date of the encounter doing chart review, history and exam, documentation and further activities per the note                Subjective   Chief Complaint   Patient presents with    Leg Problem     Started either Monday or Tuesday, red spot on left shin/calf. Says does not know what happened.            6/17/2025     1:11 PM   Additional Questions   Roomed by Aiyana MCKEON   Accompanied by Staff-mega MELISSA      Derm problem     Onset of symptoms was 2-3 day(s) ago.  Course of illness is same.    Severity mild  Current and Associated symptoms: circular rash on left lower leg, no pain or itching   Treatment measures tried include None tried.  Predisposing factors include None.                  Objective    /81   Pulse 74   Temp 97.7  F (36.5  C) (Tympanic)   Resp 16   Ht 1.68 m (5' 6.14\")   Wt 79.4 kg (175 lb)   SpO2 98%   BMI 28.12 kg/m    Body mass index is 28.12 kg/m .  Physical Exam  Constitutional:       General: He is not in acute distress.  Skin:     Comments: Left lower leg has a circular red rash with a faint area with central clearing    Neurological:      Mental Status: He is alert.      "               Signed Electronically by: Palma Long PA-C

## 2025-06-17 NOTE — TELEPHONE ENCOUNTER
Patient Returning Call    Reason for call:  Mom has questions on the UC visit from today. She would like a call back today and would prefer from Palma YIN or a RN. Does have consent to communicate on file.    Information relayed to patient:  Will leave high prioity TE for call back    Patient has additional questions:  No      Okay to leave a detailed message?: Yes at Other phone number:  [phone]

## 2025-06-19 ENCOUNTER — TELEPHONE (OUTPATIENT)
Dept: FAMILY MEDICINE | Facility: CLINIC | Age: 40
End: 2025-06-19
Payer: MEDICARE

## 2025-06-19 NOTE — TELEPHONE ENCOUNTER
Sandra called to verify if mupirocin is on his med list.  The RN did confirm this it is not on his  current medication list. The patient reported not taking on 7/13/23.      Thank you    Gilda HICKMAN RN

## 2025-07-02 ENCOUNTER — TELEPHONE (OUTPATIENT)
Dept: NURSING | Facility: CLINIC | Age: 40
End: 2025-07-02
Payer: MEDICARE

## 2025-07-03 NOTE — TELEPHONE ENCOUNTER
Mother of patient calling. Group home staff calling to see if a follow up appointment is needed. Mother states that they were not able to speak with anyone as there was no consent on file. Patient is being treated for Lyme's disease and ring worm and she is wondering if a follow up appointment is needed. Mother completed a three way call and added group home staff Jett to the line. Patient continues to have one small red spot where the bullseye rash was on his arm.   Patient was seen in  on 6/17 and treated with doxycycline for tick bite and bullseye rash. Also started on ketoconazole for scaly ringworm rash.   Connected with scheduling for follow up appointment as symptoms not full resolved.   Rhonda Villanueva RN   07/02/25 8:26 PM  [facility] Nurse Advisor

## 2025-07-10 ENCOUNTER — OFFICE VISIT (OUTPATIENT)
Dept: FAMILY MEDICINE | Facility: CLINIC | Age: 40
End: 2025-07-10
Payer: COMMERCIAL

## 2025-07-10 VITALS
TEMPERATURE: 98.5 F | HEART RATE: 95 BPM | RESPIRATION RATE: 18 BRPM | SYSTOLIC BLOOD PRESSURE: 118 MMHG | HEIGHT: 66 IN | WEIGHT: 173 LBS | BODY MASS INDEX: 27.8 KG/M2 | DIASTOLIC BLOOD PRESSURE: 76 MMHG | OXYGEN SATURATION: 98 %

## 2025-07-10 DIAGNOSIS — R21 RASH: Primary | ICD-10-CM

## 2025-07-10 ASSESSMENT — PATIENT HEALTH QUESTIONNAIRE - PHQ9
SUM OF ALL RESPONSES TO PHQ QUESTIONS 1-9: 12
10. IF YOU CHECKED OFF ANY PROBLEMS, HOW DIFFICULT HAVE THESE PROBLEMS MADE IT FOR YOU TO DO YOUR WORK, TAKE CARE OF THINGS AT HOME, OR GET ALONG WITH OTHER PEOPLE: NOT DIFFICULT AT ALL
SUM OF ALL RESPONSES TO PHQ QUESTIONS 1-9: 12

## 2025-07-10 ASSESSMENT — PAIN SCALES - GENERAL: PAINLEVEL_OUTOF10: NO PAIN (0)

## 2025-07-10 NOTE — PROGRESS NOTES
"  Assessment & Plan     Rash  Patient was treated for suspected Lyme's last month, left lower leg rash improved significantly.  Recommended to use bacitracin and apply Band-Aid.  Lyme serology ordered for further evaluation.  Other medications reviewed and no changes made.  All questions answered.      Subjective   Isaiah Ferguson is a 39 year old, presenting for the following health issues:  Derm Problem and RECHECK (Lymes disease)        7/10/2025     2:05 PM   Additional Questions   Roomed by Rubina BOTELLO   Accompanied by  - Jett     History of Present Illness       Reason for visit:  Follow up    He eats 2-3 servings of fruits and vegetables daily.He consumes 2 sweetened beverage(s) daily.He exercises with enough effort to increase his heart rate 9 or less minutes per day.  He exercises with enough effort to increase his heart rate 4 days per week. He is missing 7 dose(s) of medications per week.  He is not taking prescribed medications regularly due to other.          Rash  Onset/Duration: 3 wks   Description  Location: left lower inner calf   Character: scab  Itching: no  Intensity:  na  Progression of Symptoms:  improving  Accompanying signs and symptoms:   Fever: No  Body aches or joint pain: No  Sore throat symptoms: No  Recent cold symptoms: No  History:           Previous episodes of similar rash: no  New exposures:  None  Recent travel: No  Exposure to similar rash: No        Review of Systems  Constitutional, HEENT, cardiovascular, pulmonary, gi and gu systems are negative, except as otherwise noted.      Objective    /76   Pulse 95   Temp 98.5  F (36.9  C) (Tympanic)   Resp 18   Ht 1.683 m (5' 6.25\")   Wt 78.5 kg (173 lb)   SpO2 98%   BMI 27.71 kg/m    Body mass index is 27.71 kg/m .  Physical Exam   GENERAL: alert and no distress  RESP: no wheezes  MS: no gross musculoskeletal defects noted, no edema  SKIN: Small sore involving left lower leg as shown below, no vesicles or discharge " noted  NEURO: No focal neurology            Signed Electronically by: Higinio Denson MD

## 2025-07-17 ENCOUNTER — OFFICE VISIT (OUTPATIENT)
Dept: FAMILY MEDICINE | Facility: CLINIC | Age: 40
End: 2025-07-17
Payer: COMMERCIAL

## 2025-07-17 VITALS
SYSTOLIC BLOOD PRESSURE: 120 MMHG | HEIGHT: 66 IN | WEIGHT: 173 LBS | OXYGEN SATURATION: 99 % | HEART RATE: 74 BPM | RESPIRATION RATE: 20 BRPM | BODY MASS INDEX: 27.8 KG/M2 | TEMPERATURE: 97.8 F | DIASTOLIC BLOOD PRESSURE: 84 MMHG

## 2025-07-17 DIAGNOSIS — Z13.6 SCREENING FOR CARDIOVASCULAR CONDITION: ICD-10-CM

## 2025-07-17 DIAGNOSIS — I10 BENIGN ESSENTIAL HYPERTENSION: ICD-10-CM

## 2025-07-17 DIAGNOSIS — Z00.00 ROUTINE GENERAL MEDICAL EXAMINATION AT A HEALTH CARE FACILITY: Primary | ICD-10-CM

## 2025-07-17 DIAGNOSIS — Z00.00 MEDICARE ANNUAL WELLNESS VISIT, SUBSEQUENT: ICD-10-CM

## 2025-07-17 DIAGNOSIS — E78.5 HYPERLIPIDEMIA LDL GOAL <70: ICD-10-CM

## 2025-07-17 DIAGNOSIS — F31.9 BIPOLAR 1 DISORDER (H): ICD-10-CM

## 2025-07-17 LAB
ALBUMIN SERPL BCG-MCNC: 4.7 G/DL (ref 3.5–5.2)
ALP SERPL-CCNC: 107 U/L (ref 40–150)
ALT SERPL W P-5'-P-CCNC: 47 U/L (ref 0–70)
ANION GAP SERPL CALCULATED.3IONS-SCNC: 11 MMOL/L (ref 7–15)
AST SERPL W P-5'-P-CCNC: 30 U/L (ref 0–45)
BILIRUB SERPL-MCNC: 0.3 MG/DL
BUN SERPL-MCNC: 11.1 MG/DL (ref 6–20)
CALCIUM SERPL-MCNC: 10 MG/DL (ref 8.8–10.4)
CHLORIDE SERPL-SCNC: 104 MMOL/L (ref 98–107)
CHOLEST SERPL-MCNC: 124 MG/DL
CREAT SERPL-MCNC: 0.98 MG/DL (ref 0.67–1.17)
EGFRCR SERPLBLD CKD-EPI 2021: >90 ML/MIN/1.73M2
FASTING STATUS PATIENT QL REPORTED: NO
FASTING STATUS PATIENT QL REPORTED: NO
GLUCOSE SERPL-MCNC: 75 MG/DL (ref 70–99)
HCO3 SERPL-SCNC: 26 MMOL/L (ref 22–29)
HDLC SERPL-MCNC: 39 MG/DL
LDLC SERPL CALC-MCNC: 68 MG/DL
NONHDLC SERPL-MCNC: 85 MG/DL
POTASSIUM SERPL-SCNC: 4.2 MMOL/L (ref 3.4–5.3)
PROT SERPL-MCNC: 8.2 G/DL (ref 6.4–8.3)
SODIUM SERPL-SCNC: 141 MMOL/L (ref 135–145)
TRIGL SERPL-MCNC: 85 MG/DL

## 2025-07-17 PROCEDURE — 3048F LDL-C <100 MG/DL: CPT | Performed by: NURSE PRACTITIONER

## 2025-07-17 PROCEDURE — 99395 PREV VISIT EST AGE 18-39: CPT | Performed by: NURSE PRACTITIONER

## 2025-07-17 PROCEDURE — 3079F DIAST BP 80-89 MM HG: CPT | Performed by: NURSE PRACTITIONER

## 2025-07-17 PROCEDURE — 80053 COMPREHEN METABOLIC PANEL: CPT | Performed by: NURSE PRACTITIONER

## 2025-07-17 PROCEDURE — 1126F AMNT PAIN NOTED NONE PRSNT: CPT | Performed by: NURSE PRACTITIONER

## 2025-07-17 PROCEDURE — 36415 COLL VENOUS BLD VENIPUNCTURE: CPT | Performed by: NURSE PRACTITIONER

## 2025-07-17 PROCEDURE — 3074F SYST BP LT 130 MM HG: CPT | Performed by: NURSE PRACTITIONER

## 2025-07-17 PROCEDURE — 99214 OFFICE O/P EST MOD 30 MIN: CPT | Mod: 25 | Performed by: NURSE PRACTITIONER

## 2025-07-17 PROCEDURE — 80061 LIPID PANEL: CPT | Performed by: NURSE PRACTITIONER

## 2025-07-17 RX ORDER — QUETIAPINE FUMARATE 25 MG/1
25 TABLET, FILM COATED ORAL AT BEDTIME
Qty: 90 TABLET | Refills: 3 | Status: CANCELLED | OUTPATIENT
Start: 2025-07-17

## 2025-07-17 RX ORDER — LISINOPRIL 20 MG/1
TABLET ORAL
Qty: 90 TABLET | Refills: 3 | Status: SHIPPED | OUTPATIENT
Start: 2025-07-17

## 2025-07-17 RX ORDER — QUETIAPINE FUMARATE 200 MG/1
200 TABLET, FILM COATED ORAL EVERY MORNING
Qty: 90 TABLET | Refills: 3 | Status: CANCELLED | OUTPATIENT
Start: 2025-07-17

## 2025-07-17 RX ORDER — ROSUVASTATIN CALCIUM 20 MG/1
20 TABLET, COATED ORAL DAILY
Qty: 90 TABLET | Refills: 3 | Status: SHIPPED | OUTPATIENT
Start: 2025-07-17

## 2025-07-17 SDOH — HEALTH STABILITY: PHYSICAL HEALTH: ON AVERAGE, HOW MANY MINUTES DO YOU ENGAGE IN EXERCISE AT THIS LEVEL?: 20 MIN

## 2025-07-17 SDOH — HEALTH STABILITY: PHYSICAL HEALTH: ON AVERAGE, HOW MANY DAYS PER WEEK DO YOU ENGAGE IN MODERATE TO STRENUOUS EXERCISE (LIKE A BRISK WALK)?: 5 DAYS

## 2025-07-17 ASSESSMENT — SOCIAL DETERMINANTS OF HEALTH (SDOH): HOW OFTEN DO YOU GET TOGETHER WITH FRIENDS OR RELATIVES?: NEVER

## 2025-07-17 ASSESSMENT — PAIN SCALES - GENERAL: PAINLEVEL_OUTOF10: NO PAIN (0)

## 2025-07-17 NOTE — PROGRESS NOTES
"E4  Preventive Care Visit  [facility]  MARYCRUZ Calhoun CNP, Family Medicine  Jul 17, 2025      Assessment & Plan   Problem List Items Addressed This Visit          Cardiovascular/Peripheral Vascular    Benign essential hypertension    Relevant Medications    lisinopril (ZESTRIL) 20 MG tablet       Endocrine    Hyperlipidemia LDL goal <70    Relevant Medications    rosuvastatin (CRESTOR) 20 MG tablet    Other Relevant Orders    Lipid panel reflex to direct LDL Non-fasting (Completed)     Other Visit Diagnoses         Routine general medical examination at a health care facility    -  Primary    Relevant Orders    Comprehensive metabolic panel (BMP + Alb, Alk Phos, ALT, AST, Total. Bili, TP) (Completed)      Medicare annual wellness visit, subsequent        Relevant Medications    lisinopril (ZESTRIL) 20 MG tablet      Bipolar 1 disorder (H)          Screening for cardiovascular condition               Patient has been advised of split billing requirements and indicates understanding: Yes    BMI  Estimated body mass index is 28.35 kg/m  as calculated from the following:    Height as of this encounter: 1.664 m (5' 5.5\").    Weight as of this encounter: 78.5 kg (173 lb).   Weight management plan: Discussed healthy diet and exercise guidelines    Counseling  Appropriate preventive services were addressed with this patient via screening, questionnaire, or discussion as appropriate for fall prevention, nutrition, physical activity, Tobacco-use cessation, social engagement, weight loss and cognition.  Checklist reviewing preventive services available has been given to the patient.  Reviewed patient's diet, addressing concerns and/or questions.   Patient is at risk for social isolation and has been provided with information about the benefit of social connection.   Reviewed preventive health counseling, as reflected in patient instructions       Regular exercise       Healthy diet/nutrition   "     Vision screening       Hearing screening       Family planning       Colorectal cancer screening    Follow-up    Follow-up Visit   Expected date:  Jul 17, 2026 (Approximate)      Follow Up Appointment Details:     Follow-up with whom?: PCP    Follow-Up for what?: Adult Preventive    How?: In Person                 Subjective   Isaiah Ferguson is a 39 year old, presenting for the following:  Physical        7/17/2025     4:02 PM   Additional Questions   Roomed by Jaki   Accompanied by Jett - staff         7/17/2025   Forms   Any forms needing to be completed Yes          HPI      Advance Care Planning    Health Care Directive received at today's visit.  Forwarded to judo.        7/17/2025   General Health   How would you rate your overall physical health? Good   Feel stress (tense, anxious, or unable to sleep) Not at all         7/17/2025   Nutrition   Diet: Regular (no restrictions)         7/17/2025   Exercise   Days per week of moderate/strenous exercise 5 days   Average minutes spent exercising at this level 20 min         7/17/2025   Social Factors   Frequency of gathering with friends or relatives Never   Worry food won't last until get money to buy more No   Food not last or not have enough money for food? No   Do you have housing? (Housing is defined as stable permanent housing and does not include staying outside in a car, in a tent, in an abandoned building, in an overnight shelter, or couch-surfing.) No   Are you worried about losing your housing? No   Lack of transportation? No   Unable to get utilities (heat,electricity)? No   Want help with housing or utility concern? No   (!) HOUSING CONCERN PRESENT(!) SOCIAL CONNECTIONS CONCERN      7/17/2025   Fall Risk   Fallen 2 or more times in the past year? No   Trouble with walking or balance? No          7/17/2025   Dental   Dentist two times every year? Yes         Today's PHQ-2 Score:       7/17/2025     3:41 PM   PHQ-2 ( 1999 [employer])   Q1:  Little interest or pleasure in doing things 0   Q2: Feeling down, depressed or hopeless 0   PHQ-2 Score 0    Q1: Little interest or pleasure in doing things Not at all   Q2: Feeling down, depressed or hopeless Not at all   PHQ-2 Score 0       Patient-reported           7/17/2025   Substance Use   Alcohol more than 3/day or more than 7/wk No   Do you use any other substances recreationally? No     Social History     Tobacco Use    Smoking status: Never     Passive exposure: Yes    Smokeless tobacco: Never    Tobacco comments:     stepfather in the house   Vaping Use    Vaping status: Never Used   Substance Use Topics    Alcohol use: No    Drug use: No             7/17/2025   Contraception/Family Planning   Questions about contraception or family planning No        Reviewed and updated as needed this visit by Provider                    BP Readings from Last 3 Encounters:   07/17/25 120/84   07/10/25 118/76   06/17/25 127/81    Wt Readings from Last 3 Encounters:   07/17/25 78.5 kg (173 lb)   07/10/25 78.5 kg (173 lb)   06/17/25 79.4 kg (175 lb)                  Patient Active Problem List   Diagnosis    Attention deficit hyperactivity disorder (ADHD)    Pituitary dwarfism    CARDIOVASCULAR SCREENING; LDL GOAL LESS THAN 160    Auditory hallucinations    MR (mental retardation), moderate    Benign essential hypertension    Fall, initial encounter    Closed fracture of proximal end of right fibula, unspecified fracture morphology, initial encounter    Closed fracture of distal end of right tibia, unspecified fracture morphology, initial encounter    Pain in joint, ankle and foot, right    Hyperlipidemia LDL goal <70     Past Surgical History:   Procedure Laterality Date    OPEN REDUCTION INTERNAL FIXATION HIP NAILING Right 12/20/2021    Procedure: Right tibial nail insertion;  Surgeon: Saleem Painter MD;  Location: [facility] OR    [facility] NONSPECIFIC PROCEDURE      S/P bilateral PE tubes       Social History      Tobacco Use    Smoking status: Never     Passive exposure: Yes    Smokeless tobacco: Never    Tobacco comments:     stepfather in the house   Substance Use Topics    Alcohol use: No     Family History   Problem Relation Age of Onset    Unknown/Adopted No family hx of     Asthma No family hx of     C.A.D. No family hx of     Diabetes No family hx of     Hypertension No family hx of          Current Outpatient Medications   Medication Sig Dispense Refill    ACNE MEDICATION 5 5 % topical gel APPLY TOPICALLY ONCE DAILY *1 TOTAL FILL* **NON-COVERED MED** 42.5 g 10    atomoxetine (STRATTERA) 25 MG capsule Take 25 mg by mouth daily      ciclopirox (PENLAC) 8 % external solution APPLY TO ADJACENT SKIN & AFFECTED NAILS ONCE DAILY. REMOVE WITH ALCOHOL EVERY 7 DAYS & REPEAT **NON-COVERED MED** 6.6 mL 11    diphenhydrAMINE (BENADRYL) 25 MG tablet Take 1 tablet (25 mg) by mouth every 8 hours as needed for itching or allergies 60 tablet 1    JUBLIA 10 % SOLN APPLY TO AFFECTED AREA(S) TOPICALLY ONCE DAILY. 8 mL 11    LAMICTAL 100 MG OR TABS 1 TABLET TWICE DAILY 60 0    lisinopril (ZESTRIL) 20 MG tablet TAKE 1 TABLET BY MOUTH ONCE DAILY 90 tablet 3    olopatadine (PATADAY) 0.2 % ophthalmic solution 1 drop daily      OMEGA 3 1000 MG OR CAPS 2 CAPSULES ORALLY TWICE DAILY      polyethylene glycol (GAVILAX) 17 GM/Dose powder Take 17 g by mouth daily as needed for constipation 510 g 11    QUEtiapine (SEROQUEL) 200 MG tablet Take 1 tablet (200 mg) by mouth every morning 90 tablet 3    QUEtiapine (SEROQUEL) 25 MG tablet Take 1 tablet (25 mg) by mouth at bedtime 90 tablet 3    rosuvastatin (CRESTOR) 20 MG tablet Take 1 tablet (20 mg) by mouth daily 90 tablet 3     Allergies   Allergen Reactions    No Known Allergies      Recent Labs   Lab Test 07/15/24  0939 09/14/23  1654 07/13/23  1355 06/27/22  0944 06/27/22  0944 12/20/21  0223 02/25/21  1538   A1C  --   --   --   --  5.7*  --   --    LDL 55 196* 195*  --   --   --   --    HDL 42  "39* 43  --   --   --   --    TRIG 41 126 82  --   --   --   --    ALT 54 51 36  --  47   < >  --    CR 0.88 0.90 0.84  --  0.80   < > 0.74   GFRESTIMATED >90 >90 >90  --  >90   < > >90   GFRESTBLACK  --   --   --   --   --   --  >90   POTASSIUM 4.5 4.3 4.4   < > 4.7   < > 3.9    < > = values in this interval not displayed.          Review of Systems  Constitutional, HEENT, cardiovascular, pulmonary, gi and gu systems are negative, except as otherwise noted.     Objective    Exam  /84   Pulse 74   Temp 97.8  F (36.6  C) (Tympanic)   Resp 20   Ht 1.664 m (5' 5.5\")   Wt 78.5 kg (173 lb)   SpO2 99%   BMI 28.35 kg/m     Estimated body mass index is 28.35 kg/m  as calculated from the following:    Height as of this encounter: 1.664 m (5' 5.5\").    Weight as of this encounter: 78.5 kg (173 lb).    Physical Exam  GENERAL: alert and no distress  EYES: Eyes grossly normal to inspection, PERRL and conjunctivae and sclerae normal  HENT: ear canals and TM's normal, nose and mouth without ulcers or lesions  NECK: no adenopathy, no asymmetry, masses, or scars  RESP: lungs clear to auscultation - no rales, rhonchi or wheezes  CV: regular rate and rhythm, normal S1 S2, no S3 or S4, no murmur, click or rub, no peripheral edema  ABDOMEN: soft, nontender, no hepatosplenomegaly, no masses and bowel sounds normal  MS: no gross musculoskeletal defects noted, no edema  SKIN: no suspicious lesions or rashes  NEURO: Normal strength and tone, mentation intact and speech normal  PSYCH: mentation appears normal, affect normal/bright        Signed Electronically by: MARYCRUZ Calhoun CNP    "

## 2025-07-17 NOTE — PATIENT INSTRUCTIONS
Patient Education   Preventive Care Advice   This is general advice given by our system to help you stay healthy. However, your care team may have specific advice just for you. Please talk to your care team about your preventive care needs.  Nutrition  Eat 5 or more servings of fruits and vegetables each day.  Try wheat bread, brown rice and whole grain pasta (instead of white bread, rice, and pasta).  Get enough calcium and vitamin D. Check the label on foods and aim for 100% of the RDA (recommended daily allowance).  Lifestyle  Exercise at least 150 minutes each week  (30 minutes a day, 5 days a week).  Do muscle strengthening activities 2 days a week. These help control your weight and prevent disease.  No smoking.  Wear sunscreen to prevent skin cancer.  Have a dental exam and cleaning every 6 months.  Yearly exams  See your health care team every year to talk about:  Any changes in your health.  Any medicines your care team has prescribed.  Preventive care, family planning, and ways to prevent chronic diseases.  Shots (vaccines)   HPV shots (up to age 26), if you've never had them before.  Hepatitis B shots (up to age 59), if you've never had them before.  COVID-19 shot: Get this shot when it's due.  Flu shot: Get a flu shot every year.  Tetanus shot: Get a tetanus shot every 10 years.  Pneumococcal, hepatitis A, and RSV shots: Ask your care team if you need these based on your risk.  Shingles shot (for age 50 and up)  General health tests  Diabetes screening:  Starting at age 35, Get screened for diabetes at least every 3 years.  If you are younger than age 35, ask your care team if you should be screened for diabetes.  Cholesterol test: At age 39, start having a cholesterol test every 5 years, or more often if advised.  Bone density scan (DEXA): At age 50, ask your care team if you should have this scan for osteoporosis (brittle bones).  Hepatitis C: Get tested at least once in your life.  STIs (sexually  transmitted infections)  Before age 24: Ask your care team if you should be screened for STIs.  After age 24: Get screened for STIs if you're at risk. You are at risk for STIs (including HIV) if:  You are sexually active with more than one person.  You don't use condoms every time.  You or a partner was diagnosed with a sexually transmitted infection.  If you are at risk for HIV, ask about PrEP medicine to prevent HIV.  Get tested for HIV at least once in your life, whether you are at risk for HIV or not.  Cancer screening tests  Cervical cancer screening: If you have a cervix, begin getting regular cervical cancer screening tests starting at age 21.  Breast cancer scan (mammogram): If you've ever had breasts, begin having regular mammograms starting at age 40. This is a scan to check for breast cancer.  Colon cancer screening: It is important to start screening for colon cancer at age 45.  Have a colonoscopy test every 10 years (or more often if you're at risk) Or, ask your provider about stool tests like a FIT test every year or Cologuard test every 3 years.  To learn more about your testing options, visit:   .  For help making a decision, visit:   https://bit.ly/ru89569.  Prostate cancer screening test: If you have a prostate, ask your care team if a prostate cancer screening test (PSA) at age 55 is right for you.  Lung cancer screening: If you are a current or former smoker ages 50 to 80, ask your care team if ongoing lung cancer screenings are right for you.  For informational purposes only. Not to replace the advice of your health care provider. Copyright   2023 [facility]. All rights reserved. Clinically reviewed by the [facility]. [employer] 701657 - REV 01/24.  Relationships for Good Health  Relationships are important for our health and happiness. Social isolation, loneliness and lack of support are bad for your health. Studies show that loneliness can harm health  and limit your life span as much as high blood pressure and smoking.   Take some time to reflect on your relationships. Then answer these questions:  Are there people in your life that cause you stress or drain your energy? What can you do to set limits?  ________________________________________________________________________________________________________________________________________________________________________________________________________________________________________________________________________________________________________________________________________________  Who do you enjoy spending time with? Who can you go to for support?  ________________________________________________________________________________________________________________________________________________________________________________________________________________________________________________________________________________________________________________________________________________  What can you do to improve your relationships with others?  __________________________________________________________________________________________________________________________________________________________________________________________________________________  ______________________________________________________________________________________________________________________________  What do you like most about your relationships with others?  ________________________________________________________________________________________________________________________________________________________________________________________________________________________________________________________________________________________________________________________________________________  My goal: ______________________________________________________________________  I will:  ______________________________________________________________________________________________________________________________________________________________________________________________    For informational purposes only. Not to replace the advice of your health care provider. Copyright   2018 [facility]. All rights reserved. Clinically reviewed by [facility]  Team. [employer] [id number] - Rev 06/24.

## 2025-07-22 DIAGNOSIS — F31.9 BIPOLAR 1 DISORDER (H): ICD-10-CM

## 2025-07-22 RX ORDER — QUETIAPINE FUMARATE 200 MG/1
200 TABLET, FILM COATED ORAL EVERY MORNING
Qty: 30 TABLET | Refills: 11 | Status: SHIPPED | OUTPATIENT
Start: 2025-07-22

## 2025-07-23 ENCOUNTER — TELEPHONE (OUTPATIENT)
Dept: WOUND CARE | Facility: CLINIC | Age: 40
End: 2025-07-23
Payer: COMMERCIAL

## 2025-07-23 NOTE — TELEPHONE ENCOUNTER
Central Prior Authorization Team - Phone: [phone]     Prior Authorization Retail Medication Request    Medication/Dose: JUBLIA 10 % SOLN  CMM KEY: [id number]

## 2025-07-28 NOTE — TELEPHONE ENCOUNTER
PA Initiation    Medication: JUBLIA 10 % EX SOLN  Insurance Company: [employer] Part D - Phone [phone] Fax [phone]  Pharmacy Filling the Rx:    Filling Pharmacy Phone:    Filling Pharmacy Fax:    Start Date: 7/28/2025

## 2025-07-28 NOTE — TELEPHONE ENCOUNTER
Prior Authorization Approval    Medication: JUBLIA 10 % EX SOLN  Authorization Effective Date: 1/1/2025  Authorization Expiration Date: 7/28/2026  Approved Dose/Quantity: 8/30  Reference #: [id number]   Insurance Company: [employer] Part D - Phone [phone] Fax [phone]  Expected CoPay: $    CoPay Card Available:      Financial Assistance Needed:   Which Pharmacy is filling the prescription: [facility] - [place], [place] - [address] [address]  Pharmacy Notified: yes  Patient Notified: yes

## (undated) DEVICE — SOL NACL 0.9% IRRIG 1000ML BOTTLE 07138-09

## (undated) DEVICE — SU VICRYL 0 CT-1 36" J946H

## (undated) DEVICE — DRAPE SHEET REV FOLD 3/4 9349

## (undated) DEVICE — SU ETHILON 4-0 P-3 18" BLACK 699G

## (undated) DEVICE — GOWN IMPERVIOUS SPECIALTY XLG/XLONG 32474

## (undated) DEVICE — GOWN XLG DISP 9545

## (undated) DEVICE — IMP SCR SYN 5.0 TI LOCK T25 STARDRIVE 46MM 04.005.536S
Type: IMPLANTABLE DEVICE | Site: HIP | Status: NON-FUNCTIONAL
Removed: 2021-12-20

## (undated) DEVICE — GLOVE PROTEXIS BLUE W/NEU-THERA 8.0  2D73EB80

## (undated) DEVICE — SOL WATER IRRIG 1000ML BOTTLE 07139-09

## (undated) DEVICE — STOCKING SLEEVE COMPRESSION CALF MED

## (undated) DEVICE — SLEEVE DRILL SYN SUPRAPATELLAR 12MM 03.010.437S

## (undated) DEVICE — Device

## (undated) DEVICE — DRAPE IOBAN LG .375X23.5" 6648EZ

## (undated) DEVICE — ROD SYN REAMER BALL TIP 2.5X1150MM 351.708S

## (undated) DEVICE — DRAPE C-ARMOR 5 SIDED 5523

## (undated) DEVICE — WIRE GUIDE 3.2X400MM  357.399

## (undated) DEVICE — SYR BULB IRRIG DOVER 60 ML LATEX FREE 67000

## (undated) DEVICE — DRAPE C-ARM 60X42" 1013

## (undated) DEVICE — SU ETHILON 3-0 FS-1 18" 669H

## (undated) DEVICE — SU VICRYL 2-0 CT-1 36" UND J945H

## (undated) DEVICE — PREP CHLORAPREP 26ML TINTED ORANGE  260815

## (undated) DEVICE — GLOVE PROTEXIS W/NEU-THERA 7.5  2D73TE75

## (undated) DEVICE — DRILL BIT QUICK COUPLING 3 FLUTE 4.2MMX330/100MM CALIBRATE

## (undated) DEVICE — DRILL BIT QUICK COUPLING 3 FLUTE 4.2MMX145MM NDL  POINT

## (undated) RX ORDER — ONDANSETRON 2 MG/ML
INJECTION INTRAMUSCULAR; INTRAVENOUS
Status: DISPENSED
Start: 2021-12-20

## (undated) RX ORDER — LIDOCAINE HYDROCHLORIDE AND EPINEPHRINE 10; 10 MG/ML; UG/ML
INJECTION, SOLUTION INFILTRATION; PERINEURAL
Status: DISPENSED
Start: 2021-12-20

## (undated) RX ORDER — DEXAMETHASONE SODIUM PHOSPHATE 4 MG/ML
INJECTION, SOLUTION INTRA-ARTICULAR; INTRALESIONAL; INTRAMUSCULAR; INTRAVENOUS; SOFT TISSUE
Status: DISPENSED
Start: 2021-12-20

## (undated) RX ORDER — LIDOCAINE HYDROCHLORIDE 20 MG/ML
JELLY TOPICAL
Status: DISPENSED
Start: 2021-12-20

## (undated) RX ORDER — BUPIVACAINE HYDROCHLORIDE 2.5 MG/ML
INJECTION, SOLUTION INFILTRATION; PERINEURAL
Status: DISPENSED
Start: 2021-12-20

## (undated) RX ORDER — PROPOFOL 10 MG/ML
INJECTION, EMULSION INTRAVENOUS
Status: DISPENSED
Start: 2021-12-20

## (undated) RX ORDER — KETOROLAC TROMETHAMINE 30 MG/ML
INJECTION, SOLUTION INTRAMUSCULAR; INTRAVENOUS
Status: DISPENSED
Start: 2021-12-20

## (undated) RX ORDER — FENTANYL CITRATE 50 UG/ML
INJECTION, SOLUTION INTRAMUSCULAR; INTRAVENOUS
Status: DISPENSED
Start: 2021-12-20

## (undated) RX ORDER — GLYCOPYRROLATE 0.2 MG/ML
INJECTION, SOLUTION INTRAMUSCULAR; INTRAVENOUS
Status: DISPENSED
Start: 2021-12-20

## (undated) RX ORDER — CEFAZOLIN SODIUM 2 G/100ML
INJECTION, SOLUTION INTRAVENOUS
Status: DISPENSED
Start: 2021-12-20